# Patient Record
Sex: MALE | Race: WHITE | NOT HISPANIC OR LATINO | Employment: OTHER | URBAN - METROPOLITAN AREA
[De-identification: names, ages, dates, MRNs, and addresses within clinical notes are randomized per-mention and may not be internally consistent; named-entity substitution may affect disease eponyms.]

---

## 2017-01-12 ENCOUNTER — TRANSCRIBE ORDERS (OUTPATIENT)
Dept: ADMINISTRATIVE | Facility: HOSPITAL | Age: 62
End: 2017-01-12

## 2017-01-12 DIAGNOSIS — R13.11 ORAL PHASE DYSPHAGIA: Primary | ICD-10-CM

## 2017-01-17 ENCOUNTER — HOSPITAL ENCOUNTER (OUTPATIENT)
Dept: GENERAL RADIOLOGY | Facility: HOSPITAL | Age: 62
Discharge: HOME | End: 2017-01-17
Admitting: PHYSICAL MEDICINE & REHABILITATION

## 2017-01-17 DIAGNOSIS — R13.11 ORAL PHASE DYSPHAGIA: ICD-10-CM

## 2017-01-17 PROCEDURE — 92611 MOTION FLUOROSCOPY/SWALLOW: CPT

## 2017-01-17 PROCEDURE — G8997 SWALLOW GOAL STATUS: HCPCS

## 2017-01-17 PROCEDURE — G8998 SWALLOW D/C STATUS: HCPCS

## 2017-01-17 PROCEDURE — G8996 SWALLOW CURRENT STATUS: HCPCS

## 2017-01-17 PROCEDURE — 74230 X-RAY XM SWLNG FUNCJ C+: CPT

## 2017-01-17 NOTE — PROGRESS NOTES
Outpatient Speech Language Pathology   Adult Swallow Initial Evaluation  [facility]     Patient Name: Aquiles Bahena  : 1955  MRN: [id number]  Today's Date: 2017         Visit Date: 2017   There is no problem list on file for this patient.       History reviewed. No pertinent past medical history.     History reviewed. No pertinent past surgical history.      Visit Dx:     ICD-10-CM ICD-9-CM   1. Oral phase dysphagia R13.11 787.21         SPEECH-LANGUAGE PATHOLOGY EVALUTION - VFSS  Subjective: The patient was seen on this date for a VFSS(Videofluoroscopic Swallowing Study).  Patient was alert and cooperative.    The patient is having difficulty w/ pocketing & intermittent choking. Currently working w/ SLP.     Objective: Risks/benefits were reviewed with the patient, and consent was obtained. The study was completed with SLP present and Radiologist review. The patient was seen in lateral view(s). Textures given included thin liquid, puree consistency, mechanical soft consistency and regular consistency.    Assessment: Mild oral dysphagia impacted by cognition.  Posterior spillage occurred w/ all PO.  Piecemeal deglutition noted w/ all solids. Mild oral residue w/ puree & mech soft cleared w/ a liquid wash.  At times pocketing on right side w/ regluar, more mild to moderate oral residue noted & pt did eventually clear when cued to consume numerous drinks.  No penetration or aspiration observed. Trace pharyngeal residue noted after the swallow.  Osteophytes present at C5 & down, but did not impede the swallow.     Recommendations: Diet Textures: thin liquid, mechanical soft consistency food w/ whole meats, avoid dry & sticky textures. Medications should be taken whole with thin liquids or w/ puree if needed.     Recommended Strategies: Supervision w/ meals; encourage liquid wash throughout meals, Upright for PO and small bites and sips. Oral care before breakfast, after all meals and PRN.    Continue  w/ current SLP's POC for therapy.               Adult Dysphagia - 01/17/17 1208     Adult Dysphagia Background    Patient Description of Complaint --   reports of pocketing w/ PO at times; no everyday   -NB    Date of Onset unknown  -NB    Current Diet (Solids) Regular  -NB    Diet Level (Liquids) Thin Liquid  -NB    Oral Mech    Respiratory/Swallow Coordination Pattern WFL  -NB    Position During Evaluation Upright  -NB    Anatomy/Physiology WNL Patient demonstrates anatomy that is WNL  -NB    Dentition Patient has own teeth  -NB    Oral Health Oral cavity is clean  -NB    Awareness/Control of Secretions Patient swallows saliva  -NB    SLP Communication to Radiology    Summary Statement Mild oral dysphagia impacted by cognition.  Posterior spillage occurred w/ all PO.  Piecemeal deglutition noted w/ all solids. Mild oral residue w/ puree & mech soft cleared w/ a liquid wash.  At times pocketing on right side w/ regluar,  more mild to moderate oral residue noted & pt did eventually clear when cued to consume numerous drinks.  No penetration or aspiration observed.   -NB      User Key  (r) = Recorded By, (t) = Taken By, (c) = Cosigned By    Initials Name Provider Type    KRISSY Andrea MS CCC-SLP Speech and Language Pathologist                            OP SLP Education       01/17/17 1213          Education    Barriers to Learning Decreased comprehension  -NB      Education Provided Described results of evaluation   Pt's SLP present; explained results to her.   -NB      Assessed Learning needs;Learning motivation;Learning preferences;Learning readiness  -NB      Learning Motivation Moderate  -NB      Learning Method Explanation  -NB      Teaching Response Reinforcement needed  -NB        User Key  (r) = Recorded By, (t) = Taken By, (c) = Cosigned By    Initials Name Effective Dates    KRISSY Andrea MS CCC-SLP 04/13/15 -                     OP SLP Assessment/Plan - 01/17/17 1212     SLP Assessment    Functional  Problems Swallowing  -NB    Impact on Function: Swallowing Risk of aspiration;Risk of pneumonia  -NB    Clinical Impression: Swallowing Mild:;oral phase dysphagia  -NB    SLP Diagnosis mild oral dysphagia impacted by cognition  -NB    Prognosis Good (comment)  -NB    Patient would benefit from skilled therapy intervention Yes  -NB    SLP Plan    Frequency TBD  -NB    Duration TBD  -NB    Planned CPT's? SLP SWALLOW THERAPY: 62644  -NB    Expected Duration Therapy Session (min) 15-30 minutes  -NB    Plan Comments Continue w/ SLP's POC at facility  -NB      User Key  (r) = Recorded By, (t) = Taken By, (c) = Cosigned By    Initials Name Provider Type    KRISSY Andrea MS CCC-SLP Speech and Language Pathologist              SLP Outcome Measures (last 72 hours)      SLP Outcome Measures       01/17/17 1214          SLP Outcome Measures    Outcome Measure Used? Adult NOMS  -NB      FCM Scores    FCM Chosen Swallowing  -NB      Swallowing FCM Score 5  -NB        User Key  (r) = Recorded By, (t) = Taken By, (c) = Cosigned By    Initials Name Effective Dates    MS KEHINDE Moreland 04/13/15 -                Time Calculation:   SLP Start Time: 1030  SLP Stop Time: 1115  SLP Time Calculation (min): 45 min    Therapy Charges for Today     Code Description Service Date Service Provider Modifiers Qty    03020334300 HC ST SWALLOWING CURRENT STATUS 1/17/2017 Adrianne Andrea MS CCC-SLP GN, CJ 1    57565037808 HC ST SWALLOWING PROJECTED 1/17/2017 Adrianne Andrea MS CCC-SLP GN, CJ 1    33849168576 HC ST SWALLOWING DISCHARGE 1/17/2017 Adrianne Andrea MS CCC-SLP GN,  1    82366416874 HC ST MOTION FLUORO EVAL SWALLOW 3 1/17/2017 Adrianne Andrea MS CCC-SLP GN 1          SLP G-Codes  SLP NOMS Used?: Yes  Functional Limitations: Swallowing  Swallow Current Status (): At least 20 percent but less than 40 percent impaired, limited or restricted  Swallow Goal Status (): At least 20 percent but less than 40 percent impaired, limited  or restricted  Swallow Discharge Status (): At least 20 percent but less than 40 percent impaired, limited or restricted        Adrianne Andrea MS CCC-SLP  1/17/2017

## 2022-05-20 ENCOUNTER — HOSPITAL ENCOUNTER (EMERGENCY)
Facility: HOSPITAL | Age: 67
Discharge: NURSING, REHAB OR LONG-TERM CARE | End: 2022-05-20
Attending: EMERGENCY MEDICINE | Admitting: EMERGENCY MEDICINE

## 2022-05-20 VITALS
OXYGEN SATURATION: 97 % | HEART RATE: 84 BPM | TEMPERATURE: 97.9 F | SYSTOLIC BLOOD PRESSURE: 113 MMHG | RESPIRATION RATE: 16 BRPM | DIASTOLIC BLOOD PRESSURE: 74 MMHG

## 2022-05-20 DIAGNOSIS — K94.23 GASTROSTOMY TUBE DYSFUNCTION: Primary | ICD-10-CM

## 2022-05-20 PROCEDURE — 99283 EMERGENCY DEPT VISIT LOW MDM: CPT

## 2022-05-20 RX ORDER — POTASSIUM CHLORIDE 1500 MG/1
20 TABLET, FILM COATED, EXTENDED RELEASE ORAL 2 TIMES DAILY
COMMUNITY

## 2022-05-20 RX ORDER — DONEPEZIL HYDROCHLORIDE 10 MG/1
10 TABLET, FILM COATED ORAL NIGHTLY
COMMUNITY

## 2022-05-20 RX ORDER — POLYVINYL ALCOHOL 14 MG/ML
1 SOLUTION/ DROPS OPHTHALMIC EVERY 4 HOURS PRN
COMMUNITY

## 2022-05-20 RX ORDER — TAMSULOSIN HYDROCHLORIDE 0.4 MG/1
1 CAPSULE ORAL DAILY
COMMUNITY

## 2022-05-20 RX ORDER — GLYCOPYRROLATE 1 MG/1
1 TABLET ORAL 2 TIMES DAILY
COMMUNITY

## 2022-05-20 RX ORDER — LACTOBACILLUS ACIDOPHILUS / LACTOBACILLUS BULGARICUS 100 MILLION CFU STRENGTH
GRANULES ORAL 2 TIMES DAILY
COMMUNITY

## 2022-05-20 RX ORDER — CARVEDILOL 3.12 MG/1
3.12 TABLET ORAL 2 TIMES DAILY WITH MEALS
COMMUNITY

## 2022-05-20 RX ORDER — LOPERAMIDE HYDROCHLORIDE 2 MG/1
2 CAPSULE ORAL EVERY 6 HOURS PRN
COMMUNITY

## 2022-05-20 RX ORDER — ACETAMINOPHEN 500 MG
500 TABLET ORAL EVERY 4 HOURS PRN
COMMUNITY

## 2022-05-20 RX ORDER — PANTOPRAZOLE SODIUM 40 MG/1
40 TABLET, DELAYED RELEASE ORAL DAILY
COMMUNITY

## 2022-05-20 RX ORDER — SIMVASTATIN 40 MG
40 TABLET ORAL NIGHTLY
COMMUNITY

## 2022-05-20 RX ORDER — MIRTAZAPINE 15 MG/1
7.5 TABLET, FILM COATED ORAL NIGHTLY
COMMUNITY

## 2022-05-20 RX ORDER — HYDROCODONE BITARTRATE AND ACETAMINOPHEN 5; 325 MG/1; MG/1
1 TABLET ORAL EVERY 6 HOURS PRN
COMMUNITY

## 2022-05-20 RX ORDER — TRAZODONE HYDROCHLORIDE 50 MG/1
50 TABLET ORAL NIGHTLY
COMMUNITY

## 2022-05-20 NOTE — ED NOTES
Pt to ED from [place] [facility] for a clogged gtube. NP refused to come into facility to unclog the gtube and told them to call EMS to go to ED.

## 2022-05-20 NOTE — ED PROVIDER NOTES
MD ATTESTATION NOTE    The MANJEET and I have discussed this patient's history, physical exam, and treatment plan.  I have reviewed the documentation and personally had a face to face interaction with the patient. I affirm the documentation and agree with the treatment and plan.  The attached note describes my personal findings.      I provided a substantive portion of the care of the patient.  I personally performed the physical exam in its entirety, and below are my findings.  For this patient encounter, the patient wore surgical mask, I wore full protective PPE including N95 and eye protection.      Brief HPI: Patient presents for clogged G-tube.  Patient can give no history from nursing home.    PHYSICAL EXAM  ED Triage Vitals [05/20/22 1702]   Temp Heart Rate Resp BP SpO2   97.9 °F (36.6 °C) 83 16 103/68 97 %      Temp src Heart Rate Source Patient Position BP Location FiO2 (%)   -- -- -- -- --         GENERAL: no acute distress  HENT: nares patent  EYES: no scleral icterus  CV: regular rhythm, normal rate  RESPIRATORY: normal effort  ABDOMEN: soft.  G-tube in place  MUSCULOSKELETAL: no deformity  NEURO: Patient cannot answer questions or follow commands  PSYCH:  calm, cooperative  SKIN: warm, dry    Vital signs and nursing notes reviewed.        Plan: G-tube has been irrigated here and has been cleared.  Will be discharged back       Carlton Jones MD  05/20/22 9409

## 2022-05-20 NOTE — CASE MANAGEMENT/SOCIAL WORK
Contacted [facility] dispatch to arrange ambulance transport back to [facility]. Awaiting return call with [facility]NER Lane[facility] GIOVANY

## 2022-05-20 NOTE — ED PROVIDER NOTES
EMERGENCY DEPARTMENT ENCOUNTER    Room Number:  B10/10  Date seen:  5/20/2022  Time seen: 17:12 EDT  PCP: Mary Jane Yu MD  Historian: NH, EMS    HPI:  Chief complaint:g tube problem  A complete HPI/ROS/PMH/PSH/SH/FH are unobtainable due to: dementia  Context:Aquiles Bahena is a 66 y.o. male with multiple medical problems including dementia, acute hypoxic respiratory failure , BPH, g tube, h/oi covid, anoxic brain injury who presents to the ED with c/o clogged g tube.  Pt can nod head yes or not but is otherwise non verbal.  History is limited due to dementia    Patient was placed in face mask in first look. Patient was wearing facemask when I entered the room and throughout our encounter. I wore full protective equipment throughout this patient encounter including a N95 face mask, eye shield and gloves. Hand hygiene/washing of hands was performed before donning protective equipment and after removal when leaving the room.    MEDICAL RECORD REVIEW    ALLERGIES  Bupropion    PAST MEDICAL HISTORY  Active Ambulatory Problems     Diagnosis Date Noted   • No Active Ambulatory Problems     Resolved Ambulatory Problems     Diagnosis Date Noted   • No Resolved Ambulatory Problems     Past Medical History:   Diagnosis Date   • Anoxic brain damage (HCC)    • Anxiety    • Arthritis    • BPH (benign prostatic hyperplasia)    • Coronary artery disease    • COVID-19    • Dementia (HCC)    • Dysphagia    • GERD (gastroesophageal reflux disease)    • Hyperlipidemia    • Hypotension        PAST SURGICAL HISTORY  No past surgical history on file.    FAMILY HISTORY  No family history on file.    SOCIAL HISTORY  Social History     Socioeconomic History   • Marital status:        REVIEW OF SYSTEMS  Review of Systems   Unable to perform ROS: Dementia     PHYSICAL EXAM    ED Triage Vitals [05/20/22 1702]   Temp Heart Rate Resp BP SpO2   97.9 °F (36.6 °C) 83 16 103/68 97 %      Temp src Heart Rate Source Patient Position BP Location  FiO2 (%)   -- -- -- -- --     Physical Exam    I have reviewed the triage vital signs and nursing notes.      GENERAL: not distressed, chronically ill appearing  HENT: nares patent  EYES: no scleral icterus  NECK: no ROM limitations  CV: regular rhythm, regular rate, no murmur, no rubs, no gallups  RESPIRATORY: normal effort, CTAB, occasional cough  ABDOMEN: soft, rounded.  G tube in place.  I was able to easily flush the gtube multiple times.  I injected air and auscultated and the air sound is appropriate.  There is no abdominal tenderness  : deferred  MUSCULOSKELETAL: no deformity  NEURO: alert, moves all extremities, follows commands  SKIN: warm, dry    PROGRESS, DATA ANALYSIS, CONSULTS AND MEDICAL DECISION MAKING  All labs have been independently reviewed by me.  All radiology studies have been reviewed by me and discussed with radiologist dictating the report.  EKG's independently viewed and interpreted by me unless stated otherwise. Discussion below represents my analysis of pertinent findings related to patient's condition, differential diagnosis, treatment plan and final disposition.        DDX: g tube problem, G tube displacement, g tube not flushing    MDM: I was able to easily flush the G-tube and auscultate placement with a air bolus.  There were no other complaints.  Nurse did call the nursing home to make sure there were no other concerns and there were not.  Patient will be discharged back to the skilled nursing facility.     Reviewed pt's history and workup with Dr. Jones.  After a bedside evaluation, Dr. Jones agrees with the plan of care.    The patient's history, physical exam, and lab findings were discussed with the physician, who also performed a face to face history and physical exam.  I discussed all results and noted any abnormalities with patient.  Discussed absoute need to recheck abnormalities with their family physician.  I answered any of the patient's questions.  Discussed  plan for discharge, as there is no emergent indication for admission.  Pt is agreeable and understands need for follow up and repeat testing.  Pt is aware that discharge does not mean that nothing is wrong but it indicates no emergency is present and they must continue care with their family physician.  Pt is discharged with instructions to follow up with primary care doctor to have their blood pressure rechecked.     Disposition vitals:  /68   Pulse 83   Temp 97.9 °F (36.6 °C)   Resp 16   SpO2 97%       DIAGNOSIS  Final diagnoses:   Gastrostomy tube dysfunction (HCC)       FOLLOW UP   Mary Jane Yu MD  [address] [facility] [address] [phone]    Schedule an appointment as soon as possible for a visit            Ailyn Mcgregor, APRN  05/20/22 5534

## 2023-05-15 ENCOUNTER — APPOINTMENT (OUTPATIENT)
Dept: GENERAL RADIOLOGY | Facility: HOSPITAL | Age: 68
End: 2023-05-15
Payer: MEDICARE

## 2023-05-15 PROCEDURE — 71046 X-RAY EXAM CHEST 2 VIEWS: CPT

## 2023-05-15 PROCEDURE — 0202U NFCT DS 22 TRGT SARS-COV-2: CPT | Performed by: EMERGENCY MEDICINE

## 2023-05-15 PROCEDURE — 99285 EMERGENCY DEPT VISIT HI MDM: CPT

## 2023-05-15 PROCEDURE — 36415 COLL VENOUS BLD VENIPUNCTURE: CPT

## 2023-05-15 NOTE — ED TRIAGE NOTES
"To ER via EMS from [facility].  Sent in for g-tube replacement.  Per facility g-tube clogged and when staff tried to flush it, it \"popped' and broke in half.      Upon arrival to ER pt noted to have productive cough and appears to not feel well.   "

## 2023-05-16 ENCOUNTER — HOSPITAL ENCOUNTER (OUTPATIENT)
Facility: HOSPITAL | Age: 68
LOS: 1 days | Discharge: NURSING, REHAB OR LONG-TERM CARE | End: 2023-05-17
Attending: EMERGENCY MEDICINE | Admitting: HOSPITALIST
Payer: MEDICARE

## 2023-05-16 DIAGNOSIS — J18.9 PNEUMONIA OF LEFT LOWER LOBE DUE TO INFECTIOUS ORGANISM: Primary | ICD-10-CM

## 2023-05-16 DIAGNOSIS — K94.23 GASTROSTOMY TUBE DYSFUNCTION: ICD-10-CM

## 2023-05-16 DIAGNOSIS — R79.89 ELEVATED LACTIC ACID LEVEL: ICD-10-CM

## 2023-05-16 PROBLEM — R13.10 DYSPHAGIA: Status: ACTIVE | Noted: 2023-05-16

## 2023-05-16 PROBLEM — I25.10 CAD (CORONARY ARTERY DISEASE): Status: ACTIVE | Noted: 2023-05-16

## 2023-05-16 PROBLEM — N40.0 BPH (BENIGN PROSTATIC HYPERPLASIA): Status: ACTIVE | Noted: 2023-05-16

## 2023-05-16 PROBLEM — K21.9 GERD (GASTROESOPHAGEAL REFLUX DISEASE): Status: ACTIVE | Noted: 2023-05-16

## 2023-05-16 PROBLEM — I10 HTN (HYPERTENSION): Status: ACTIVE | Noted: 2023-05-16

## 2023-05-16 PROBLEM — E78.5 HLD (HYPERLIPIDEMIA): Status: ACTIVE | Noted: 2023-05-16

## 2023-05-16 PROBLEM — G93.1 ANOXIC BRAIN INJURY: Status: ACTIVE | Noted: 2023-05-16

## 2023-05-16 PROBLEM — F03.90 DEMENTIA: Status: ACTIVE | Noted: 2023-05-16

## 2023-05-16 LAB
ALBUMIN SERPL-MCNC: 4.1 G/DL (ref 3.5–5.2)
ALBUMIN/GLOB SERPL: 1 G/DL
ALP SERPL-CCNC: 140 U/L (ref 39–117)
ALT SERPL W P-5'-P-CCNC: 25 U/L (ref 1–41)
ANION GAP SERPL CALCULATED.3IONS-SCNC: 13.3 MMOL/L (ref 5–15)
ANION GAP SERPL CALCULATED.3IONS-SCNC: 6.7 MMOL/L (ref 5–15)
APTT PPP: 27.5 SECONDS (ref 22.7–35.4)
AST SERPL-CCNC: 21 U/L (ref 1–40)
BASOPHILS # BLD AUTO: 0.04 10*3/MM3 (ref 0–0.2)
BASOPHILS NFR BLD AUTO: 0.5 % (ref 0–1.5)
BILIRUB SERPL-MCNC: 0.6 MG/DL (ref 0–1.2)
BUN SERPL-MCNC: 10 MG/DL (ref 8–23)
BUN SERPL-MCNC: 13 MG/DL (ref 8–23)
BUN/CREAT SERPL: 18.9 (ref 7–25)
BUN/CREAT SERPL: 20.3 (ref 7–25)
CALCIUM SPEC-SCNC: 8.4 MG/DL (ref 8.6–10.5)
CALCIUM SPEC-SCNC: 9.6 MG/DL (ref 8.6–10.5)
CHLORIDE SERPL-SCNC: 105 MMOL/L (ref 98–107)
CHLORIDE SERPL-SCNC: 109 MMOL/L (ref 98–107)
CO2 SERPL-SCNC: 24.3 MMOL/L (ref 22–29)
CO2 SERPL-SCNC: 27.7 MMOL/L (ref 22–29)
CREAT SERPL-MCNC: 0.53 MG/DL (ref 0.76–1.27)
CREAT SERPL-MCNC: 0.64 MG/DL (ref 0.76–1.27)
D-LACTATE SERPL-SCNC: 1.1 MMOL/L (ref 0.5–2)
D-LACTATE SERPL-SCNC: 4.7 MMOL/L (ref 0.5–2)
DEPRECATED RDW RBC AUTO: 39.2 FL (ref 37–54)
DEPRECATED RDW RBC AUTO: 41 FL (ref 37–54)
EGFRCR SERPLBLD CKD-EPI 2021: 103.8 ML/MIN/1.73
EGFRCR SERPLBLD CKD-EPI 2021: 109.8 ML/MIN/1.73
EOSINOPHIL # BLD AUTO: 0.26 10*3/MM3 (ref 0–0.4)
EOSINOPHIL NFR BLD AUTO: 3.2 % (ref 0.3–6.2)
ERYTHROCYTE [DISTWIDTH] IN BLOOD BY AUTOMATED COUNT: 12.5 % (ref 12.3–15.4)
ERYTHROCYTE [DISTWIDTH] IN BLOOD BY AUTOMATED COUNT: 12.7 % (ref 12.3–15.4)
GLOBULIN UR ELPH-MCNC: 4.1 GM/DL
GLUCOSE BLDC GLUCOMTR-MCNC: 79 MG/DL (ref 70–130)
GLUCOSE SERPL-MCNC: 104 MG/DL (ref 65–99)
GLUCOSE SERPL-MCNC: 114 MG/DL (ref 65–99)
HCT VFR BLD AUTO: 42.6 % (ref 37.5–51)
HCT VFR BLD AUTO: 47.5 % (ref 37.5–51)
HGB BLD-MCNC: 14 G/DL (ref 13–17.7)
HGB BLD-MCNC: 15 G/DL (ref 13–17.7)
IMM GRANULOCYTES # BLD AUTO: 0.02 10*3/MM3 (ref 0–0.05)
IMM GRANULOCYTES NFR BLD AUTO: 0.2 % (ref 0–0.5)
INR PPP: 0.94 (ref 0.9–1.1)
LYMPHOCYTES # BLD AUTO: 1.88 10*3/MM3 (ref 0.7–3.1)
LYMPHOCYTES NFR BLD AUTO: 22.9 % (ref 19.6–45.3)
MCH RBC QN AUTO: 28 PG (ref 26.6–33)
MCH RBC QN AUTO: 28.7 PG (ref 26.6–33)
MCHC RBC AUTO-ENTMCNC: 31.6 G/DL (ref 31.5–35.7)
MCHC RBC AUTO-ENTMCNC: 32.9 G/DL (ref 31.5–35.7)
MCV RBC AUTO: 87.3 FL (ref 79–97)
MCV RBC AUTO: 88.8 FL (ref 79–97)
MONOCYTES # BLD AUTO: 0.58 10*3/MM3 (ref 0.1–0.9)
MONOCYTES NFR BLD AUTO: 7.1 % (ref 5–12)
MRSA DNA SPEC QL NAA+PROBE: NORMAL
NEUTROPHILS NFR BLD AUTO: 5.44 10*3/MM3 (ref 1.7–7)
NEUTROPHILS NFR BLD AUTO: 66.1 % (ref 42.7–76)
NRBC BLD AUTO-RTO: 0 /100 WBC (ref 0–0.2)
PLATELET # BLD AUTO: 192 10*3/MM3 (ref 140–450)
PLATELET # BLD AUTO: 216 10*3/MM3 (ref 140–450)
PMV BLD AUTO: 10.5 FL (ref 6–12)
PMV BLD AUTO: 11 FL (ref 6–12)
POTASSIUM SERPL-SCNC: 3.8 MMOL/L (ref 3.5–5.2)
POTASSIUM SERPL-SCNC: 3.9 MMOL/L (ref 3.5–5.2)
PROCALCITONIN SERPL-MCNC: 0.05 NG/ML (ref 0–0.25)
PROT SERPL-MCNC: 8.2 G/DL (ref 6–8.5)
PROTHROMBIN TIME: 12.7 SECONDS (ref 11.7–14.2)
RBC # BLD AUTO: 4.88 10*6/MM3 (ref 4.14–5.8)
RBC # BLD AUTO: 5.35 10*6/MM3 (ref 4.14–5.8)
SODIUM SERPL-SCNC: 140 MMOL/L (ref 136–145)
SODIUM SERPL-SCNC: 146 MMOL/L (ref 136–145)
WBC NRBC COR # BLD: 7.82 10*3/MM3 (ref 3.4–10.8)
WBC NRBC COR # BLD: 8.22 10*3/MM3 (ref 3.4–10.8)

## 2023-05-16 PROCEDURE — 96367 TX/PROPH/DG ADDL SEQ IV INF: CPT

## 2023-05-16 PROCEDURE — 85025 COMPLETE CBC W/AUTO DIFF WBC: CPT | Performed by: EMERGENCY MEDICINE

## 2023-05-16 PROCEDURE — 83605 ASSAY OF LACTIC ACID: CPT | Performed by: EMERGENCY MEDICINE

## 2023-05-16 PROCEDURE — 85027 COMPLETE CBC AUTOMATED: CPT | Performed by: NURSE PRACTITIONER

## 2023-05-16 PROCEDURE — 85730 THROMBOPLASTIN TIME PARTIAL: CPT | Performed by: EMERGENCY MEDICINE

## 2023-05-16 PROCEDURE — 85610 PROTHROMBIN TIME: CPT | Performed by: EMERGENCY MEDICINE

## 2023-05-16 PROCEDURE — 0 CEFEPIME PER 500 MG: Performed by: EMERGENCY MEDICINE

## 2023-05-16 PROCEDURE — 96365 THER/PROPH/DIAG IV INF INIT: CPT

## 2023-05-16 PROCEDURE — 82948 REAGENT STRIP/BLOOD GLUCOSE: CPT

## 2023-05-16 PROCEDURE — 84145 PROCALCITONIN (PCT): CPT | Performed by: EMERGENCY MEDICINE

## 2023-05-16 PROCEDURE — 96366 THER/PROPH/DIAG IV INF ADDON: CPT

## 2023-05-16 PROCEDURE — 87040 BLOOD CULTURE FOR BACTERIA: CPT | Performed by: EMERGENCY MEDICINE

## 2023-05-16 PROCEDURE — 96361 HYDRATE IV INFUSION ADD-ON: CPT

## 2023-05-16 PROCEDURE — 99203 OFFICE O/P NEW LOW 30 MIN: CPT | Performed by: SURGERY

## 2023-05-16 PROCEDURE — 80053 COMPREHEN METABOLIC PANEL: CPT | Performed by: EMERGENCY MEDICINE

## 2023-05-16 PROCEDURE — 25010000002 VANCOMYCIN 10 G RECONSTITUTED SOLUTION: Performed by: EMERGENCY MEDICINE

## 2023-05-16 PROCEDURE — 87641 MR-STAPH DNA AMP PROBE: CPT | Performed by: NURSE PRACTITIONER

## 2023-05-16 RX ORDER — POTASSIUM CHLORIDE 20MEQ/15ML
20 LIQUID (ML) ORAL 2 TIMES DAILY
COMMUNITY
Start: 2023-05-01 | End: 2023-05-17 | Stop reason: HOSPADM

## 2023-05-16 RX ORDER — SODIUM CHLORIDE 0.9 % (FLUSH) 0.9 %
10 SYRINGE (ML) INJECTION EVERY 12 HOURS SCHEDULED
Status: DISCONTINUED | OUTPATIENT
Start: 2023-05-16 | End: 2023-05-17 | Stop reason: HOSPADM

## 2023-05-16 RX ORDER — SODIUM CHLORIDE 9 MG/ML
40 INJECTION, SOLUTION INTRAVENOUS AS NEEDED
Status: DISCONTINUED | OUTPATIENT
Start: 2023-05-16 | End: 2023-05-17 | Stop reason: HOSPADM

## 2023-05-16 RX ORDER — ATORVASTATIN CALCIUM 20 MG/1
20 TABLET, FILM COATED ORAL DAILY
Status: DISCONTINUED | OUTPATIENT
Start: 2023-05-16 | End: 2023-05-17 | Stop reason: HOSPADM

## 2023-05-16 RX ORDER — SODIUM CHLORIDE 0.9 % (FLUSH) 0.9 %
10 SYRINGE (ML) INJECTION AS NEEDED
Status: DISCONTINUED | OUTPATIENT
Start: 2023-05-16 | End: 2023-05-17 | Stop reason: HOSPADM

## 2023-05-16 RX ORDER — ACETAMINOPHEN 160 MG/5ML
650 SOLUTION ORAL EVERY 4 HOURS PRN
Status: DISCONTINUED | OUTPATIENT
Start: 2023-05-16 | End: 2023-05-17 | Stop reason: HOSPADM

## 2023-05-16 RX ORDER — ACETAMINOPHEN 650 MG/1
650 SUPPOSITORY RECTAL EVERY 4 HOURS PRN
Status: DISCONTINUED | OUTPATIENT
Start: 2023-05-16 | End: 2023-05-17 | Stop reason: HOSPADM

## 2023-05-16 RX ORDER — SODIUM CHLORIDE 9 MG/ML
100 INJECTION, SOLUTION INTRAVENOUS CONTINUOUS
Status: DISCONTINUED | OUTPATIENT
Start: 2023-05-16 | End: 2023-05-16

## 2023-05-16 RX ORDER — ASPIRIN 81 MG/1
81 TABLET, CHEWABLE ORAL DAILY
Status: DISCONTINUED | OUTPATIENT
Start: 2023-05-16 | End: 2023-05-17 | Stop reason: HOSPADM

## 2023-05-16 RX ORDER — NITROGLYCERIN 0.4 MG/1
0.4 TABLET SUBLINGUAL
Status: DISCONTINUED | OUTPATIENT
Start: 2023-05-16 | End: 2023-05-17 | Stop reason: HOSPADM

## 2023-05-16 RX ORDER — ONDANSETRON 4 MG/1
4 TABLET, FILM COATED ORAL EVERY 6 HOURS PRN
Status: DISCONTINUED | OUTPATIENT
Start: 2023-05-16 | End: 2023-05-17 | Stop reason: HOSPADM

## 2023-05-16 RX ORDER — IPRATROPIUM BROMIDE AND ALBUTEROL SULFATE 2.5; .5 MG/3ML; MG/3ML
3 SOLUTION RESPIRATORY (INHALATION) EVERY 4 HOURS PRN
Status: DISCONTINUED | OUTPATIENT
Start: 2023-05-16 | End: 2023-05-17 | Stop reason: HOSPADM

## 2023-05-16 RX ORDER — DONEPEZIL HYDROCHLORIDE 10 MG/1
10 TABLET, FILM COATED ORAL NIGHTLY
Status: DISCONTINUED | OUTPATIENT
Start: 2023-05-16 | End: 2023-05-17 | Stop reason: HOSPADM

## 2023-05-16 RX ORDER — ACETAMINOPHEN 500 MG
500 TABLET ORAL EVERY 4 HOURS PRN
Status: DISCONTINUED | OUTPATIENT
Start: 2023-05-16 | End: 2023-05-16 | Stop reason: SDUPTHER

## 2023-05-16 RX ORDER — ONDANSETRON 2 MG/ML
4 INJECTION INTRAMUSCULAR; INTRAVENOUS EVERY 6 HOURS PRN
Status: DISCONTINUED | OUTPATIENT
Start: 2023-05-16 | End: 2023-05-17 | Stop reason: HOSPADM

## 2023-05-16 RX ORDER — OMEPRAZOLE 40 MG/1
40 CAPSULE, DELAYED RELEASE ORAL DAILY
COMMUNITY
Start: 2023-03-01

## 2023-05-16 RX ORDER — CALCIUM CARBONATE 500 MG/1
2 TABLET, CHEWABLE ORAL 2 TIMES DAILY PRN
Status: DISCONTINUED | OUTPATIENT
Start: 2023-05-16 | End: 2023-05-17 | Stop reason: HOSPADM

## 2023-05-16 RX ORDER — ACETAMINOPHEN 325 MG/1
650 TABLET ORAL EVERY 4 HOURS PRN
Status: DISCONTINUED | OUTPATIENT
Start: 2023-05-16 | End: 2023-05-17 | Stop reason: HOSPADM

## 2023-05-16 RX ORDER — CARVEDILOL 3.12 MG/1
3.12 TABLET ORAL 2 TIMES DAILY WITH MEALS
Status: DISCONTINUED | OUTPATIENT
Start: 2023-05-16 | End: 2023-05-17 | Stop reason: HOSPADM

## 2023-05-16 RX ORDER — GLYCOPYRROLATE 1 MG/1
1 TABLET ORAL 2 TIMES DAILY
Status: DISCONTINUED | OUTPATIENT
Start: 2023-05-16 | End: 2023-05-17 | Stop reason: HOSPADM

## 2023-05-16 RX ORDER — TRAZODONE HYDROCHLORIDE 50 MG/1
50 TABLET ORAL NIGHTLY
Status: DISCONTINUED | OUTPATIENT
Start: 2023-05-16 | End: 2023-05-17 | Stop reason: HOSPADM

## 2023-05-16 RX ORDER — TERAZOSIN 1 MG/1
1 CAPSULE ORAL NIGHTLY
Status: DISCONTINUED | OUTPATIENT
Start: 2023-05-16 | End: 2023-05-17 | Stop reason: HOSPADM

## 2023-05-16 RX ADMIN — VANCOMYCIN HYDROCHLORIDE 2250 MG: 10 INJECTION, POWDER, LYOPHILIZED, FOR SOLUTION INTRAVENOUS at 04:08

## 2023-05-16 RX ADMIN — ASPIRIN 81 MG: 81 TABLET, CHEWABLE ORAL at 15:12

## 2023-05-16 RX ADMIN — Medication 10 ML: at 03:33

## 2023-05-16 RX ADMIN — DONEPEZIL HYDROCHLORIDE 10 MG: 10 TABLET, FILM COATED ORAL at 21:54

## 2023-05-16 RX ADMIN — SODIUM CHLORIDE 100 ML/HR: 9 INJECTION, SOLUTION INTRAVENOUS at 04:08

## 2023-05-16 RX ADMIN — TERAZOSIN HYDROCHLORIDE 1 MG: 1 CAPSULE ORAL at 21:55

## 2023-05-16 RX ADMIN — GLYCOPYRROLATE 1 MG: 1 TABLET ORAL at 21:54

## 2023-05-16 RX ADMIN — CEFEPIME 2 G: 2 INJECTION, POWDER, FOR SOLUTION INTRAVENOUS at 03:29

## 2023-05-16 RX ADMIN — TRAZODONE HYDROCHLORIDE 50 MG: 50 TABLET ORAL at 21:54

## 2023-05-16 RX ADMIN — ATORVASTATIN CALCIUM 20 MG: 20 TABLET, FILM COATED ORAL at 15:12

## 2023-05-16 RX ADMIN — CARVEDILOL 3.12 MG: 3.12 TABLET, FILM COATED ORAL at 21:54

## 2023-05-16 RX ADMIN — SODIUM CHLORIDE 100 ML/HR: 9 INJECTION, SOLUTION INTRAVENOUS at 08:34

## 2023-05-16 RX ADMIN — Medication 10 ML: at 21:55

## 2023-05-16 NOTE — CONSULTS
"Nutrition Services    Patient Name:  Aquiles Bahena  YOB: 1955  MRN: [id number]  Admit Date:  5/16/2023    Assessment Date:  05/16/23    Comment: Nutrition consult for TF assessment.  Patient from NH with clogged Gtube.  History of anoxic brain injury, dementia, dysphagia.  Dr. Kirk has resolved the Gtube issue at bedside and tube is now okay to use.      Per paper chart, patient was receiving TFs of Osmolite 1.5 at 50 mL/hr with 60 mL q hour flushes at NH.  Our equivalent of this formula is Nutren 1.5.  Recommend starting TFs of Nutren 1.5 at 20 mL/hr and advancing by 10 mL q 2 hours to goal rate of 50 mL/hr.  Recommend free water flushes of 30 mL q 4 hours for now while IVFs running.    Per chart review, plans for back to NH soon.    RD to follow closely for tolerance.    CLINICAL NUTRITION ASSESSMENT      Reason for Assessment Tube Feeding Assessment      Diagnosis/Problem   Gtube dysfunction    Medical/Surgical History Past Medical History:   Diagnosis Date   • Anoxic brain damage    • Anxiety    • Arthritis    • BPH (benign prostatic hyperplasia)    • Coronary artery disease    • COVID-19    • Dementia    • Dysphagia    • GERD (gastroesophageal reflux disease)    • Hyperlipidemia    • Hypotension        Past Surgical History:   Procedure Laterality Date   • GTUBE INSERTION          Encounter Information        Nutrition History:  Receives TFs of Osmolite 1.5 at 50 mL/hr with 60 mL q hr flushes at NH   Food Preferences:    Supplements:    Factors Affecting Intake: swallow impairment     Anthropometrics        Current Height  Current Weight  BMI kg/m2 Height: 180.3 cm (71\")  Weight: 77.9 kg (171 lb 11.2 oz) (05/16/23 0825)  Body mass index is 23.95 kg/m².   Adjusted BMI (if applicable)    BMI Category Normal/Healthy (18.4 - 24.9)       Admission Weight 171 lb (5/16)       Ideal Body Weight (IBW) 172 lb (78.2 kg)   Adjusted IBW (if applicable)        Usual Body Weight (UBW) 151 lb (4/2022)   Weight " Change/Trend Gain       Weight History Wt Readings from Last 30 Encounters:   05/16/23 0825 77.9 kg (171 lb 11.2 oz)   05/16/23 0204 109 kg (240 lb)           --  Estimated/Assessed Needs       Energy Requirements    Weight for Calculation 171 lb (77.9 kg)   Method for Estimation  20 kcal/kg, 25 kcal/kg   EST Needs (kcal/day) 3017-4466       Protein Requirements    Weight for Calculation 171 lb (77.9 kg)   EST Protein Needs (g/kg) 1.0 gm/kg   EST Daily Needs (g/day) 78       Fluid Requirements     Method for Estimation 30 mL/kg    Estimated Needs (mL/day) 2300     Tests/Procedures        Tests/Procedures No new tests/procedures     Labs       Pertinent Labs    Results from last 7 days   Lab Units 05/16/23 0527 05/16/23 0102   SODIUM mmol/L 140 146*   POTASSIUM mmol/L 3.9 3.8   CHLORIDE mmol/L 109* 105   CO2 mmol/L 24.3 27.7   BUN mg/dL 10 13   CREATININE mg/dL 0.53* 0.64*   CALCIUM mg/dL 8.4* 9.6   BILIRUBIN mg/dL  --  0.6   ALK PHOS U/L  --  140*   ALT (SGPT) U/L  --  25   AST (SGOT) U/L  --  21   GLUCOSE mg/dL 114* 104*     Results from last 7 days   Lab Units 05/16/23 0527 05/16/23  0102   HEMOGLOBIN g/dL 14.0 15.0   HEMATOCRIT % 42.6 47.5   WBC 10*3/mm3 7.82 8.22   ALBUMIN g/dL  --  4.1     Results from last 7 days   Lab Units 05/16/23 0527 05/16/23  0102   INR   --  0.94   APTT seconds  --  27.5   PLATELETS 10*3/mm3 192 216     COVID19   Date Value Ref Range Status   05/15/2023 Not Detected Not Detected - Ref. Range Final     No results found for: HGBA1C       Medications           Scheduled Medications aspirin, 81 mg, Per G Tube, Daily  atorvastatin, 20 mg, Per G Tube, Daily  carvedilol, 3.125 mg, Per G Tube, BID With Meals  donepezil, 10 mg, Per G Tube, Nightly  glycopyrrolate, 1 mg, Per G Tube, BID  [START ON 5/17/2023] lansoprazole, 30 mg, Per G Tube, Q AM  sodium chloride, 10 mL, Intravenous, Q12H  terazosin, 1 mg, Per G Tube, Nightly  traZODone, 50 mg, Per G Tube, Nightly       Infusions sodium  chloride, 100 mL/hr, Last Rate: 100 mL/hr (05/16/23 0834)       PRN Medications •  acetaminophen **OR** acetaminophen **OR** acetaminophen  •  calcium carbonate  •  ipratropium-albuterol  •  nitroglycerin  •  ondansetron **OR** ondansetron  •  [COMPLETED] Insert Peripheral IV **AND** sodium chloride  •  sodium chloride  •  sodium chloride     Physical Findings          Physical Appearance disoriented, room air   Oral/Mouth Cavity tooth or teeth missing   Edema  no edema   Gastrointestinal fecal incontinence, last bowel movement:5/16   Skin  excoriation   Tubes/Drains/Lines gastrostomy tube   NFPE Not applicable at this time   --  Current Nutrition Orders & Evaluation of Intake       Oral Nutrition     Food Allergies NKFA   Current PO Diet NPO Diet NPO Type: Strict NPO   Supplement n/a   PO Evaluation     % PO Intake n/a    # of Days Evaluated    --  PES STATEMENT / NUTRITION DIAGNOSIS      Nutrition Dx Problem  Problem: Needs Alternative Route  Etiology: Medical Diagnosis (anoxic brain injury, dysphagia)  Signs/Symptoms: NPO    Comment:    --  NUTRITION INTERVENTION / PLAN OF CARE      Intervention Goal(s) Maintain nutrition status, Nutrition support treatment, Reduce/improve symptoms, Meet estimated needs, Disease management/therapy, Initiate TF/PN, Tolerate TF/PN at goal and Maintain weight         RD Intervention/Action Await initiation of EN/PN, Follow Tx Progress, Care plan reviewed and Recommend/ordered: EN         Prescription/Orders:       PO Diet       Supplements       Snacks       Enteral Nutrition    Enteral Prescription:     Enteral Route Gastrostomy    TF Delivery Method Continuous    Enteral Product Nutren 1.5    Modular None    Propofol Rate/Kcal     TF Start Rate (mL/hr) 20    TF Goal Rate (mL/hr) 50    Free Water Flush (mL) 30 mL q 4 hours (rec 55 mL q hour once IVFs d/c'ed)    TF Provision at Goal: 1800 kcal, 82 gm protein, 912 mL free water + 180 mL water flushes         Calories 100 % needs met          Protein  100 % needs met         Fluid (mL) 1092 mL total     Prescription Ordered Yes         Parenteral Nutrition    New Prescription Ordered? Yes   --      Monitor/Evaluation Per protocol, I&O, Pertinent labs, EN delivery/tolerance, Weight, Skin status, GI status, Symptoms   Discharge Plan/Needs Pending clinical course   Education Will instruct as appropriate   --    RD to follow per protocol.      Electronically signed by:  Antoinette Pickett RD  05/16/23 15:48 EDT

## 2023-05-16 NOTE — CONSULTS
General Surgery Consultation    Consulting Physician: Caro Kirk MD  Referring Physician: IDRIS Elizondo    Reason for consultation: PEG tube dysfunction    CC: Unable to be obtained, patient is nonverbal/poor historian    HPI:   The patient is a 67 y.o. male that presented to the hospital emergency room overnight with a clogged G-tube.  He resides in a nursing home ([facility]), and the facility nursing staff told the EMS when they tried flushing his clogged G-tube it broke in half yesterday.  The tube was then cut and capped at 4 cm from the skin.  The patient has a history of anoxic brain injury, and I am unsure how long the tube has been in place but per CT reports from the [facility] system over 1 year ago he had a G-tube in place at that time.    Past Medical History:  Reportedly has history of anoxic brain injury, otherwise unable to be obtained    Past Surgical History:  Endoscopic PEG tube placement, otherwise unable to be obtained    Medications:  Medications Prior to Admission   Medication Sig Dispense Refill Last Dose   • acetaminophen (TYLENOL) 500 MG tablet Administer 1 tablet per G tube Every 4 (Four) Hours As Needed for Mild Pain.   Unknown   • Aspirin 81 MG capsule Administer  per G tube Daily.   Unknown   • carvedilol (COREG) 3.125 MG tablet Administer 1 tablet per G tube 2 (Two) Times a Day With Meals.   Unknown   • donepezil (ARICEPT) 10 MG tablet Administer 1 tablet per G tube Every Night.   Unknown   • glycopyrrolate (ROBINUL) 1 MG tablet Administer 1 tablet per G tube 2 (Two) Times a Day.   Unknown   • loperamide (IMODIUM) 2 MG capsule Administer 1 capsule per G tube Every 6 (Six) Hours As Needed for Diarrhea.   Unknown   • omeprazole (priLOSEC) 40 MG capsule Administer 1 capsule per G tube Daily.   Unknown   • pantoprazole (PROTONIX) 40 MG EC tablet 1 tablet Daily.      • polyvinyl alcohol (LIQUIFILM) 1.4 % ophthalmic solution Administer 1 drop to the right eye Every  4 (Four) Hours As Needed for Dry Eyes.   Unknown   • potassium chloride (K-DUR,KLOR-CON) 20 MEQ tablet controlled-release ER tablet 1 tablet 2 (Two) Times a Day.      • potassium chloride (KAYCIEL) 20 mEq/15 mL solution Administer 15 mL per G tube 2 (Two) Times a Day.   Unknown   • simvastatin (ZOCOR) 40 MG tablet Administer 1 tablet per G tube Every Night.   Unknown   • tamsulosin (FLOMAX) 0.4 MG capsule 24 hr capsule 1 capsule Daily.   Unknown   • traZODone (DESYREL) 50 MG tablet Administer 1 tablet per G tube Every Night.   Unknown       Allergies: Per chart review he has an allergy to bupropion    Social History: Unable to be obtained, patient is poor historian    Family History: Unable to be obtained, patient is poor historian    Review of Systems:  Physical Exam:   Vitals:    05/16/23 0825   BP: 115/66   Pulse: 75   Resp: 18   Temp: 97.8 °F (36.6 °C)   SpO2: 96%     Height: 180 cm  Weight: 77.9 kg  BMI: 23.95  GENERAL: Sleeping comfortably, in no acute distress, awakens to voice but is stoic and does not answer any questions for me  HEENT: normocephalic, atraumatic, no scleral icterus, moist mucous membranes  NECK: Supple, there is no thyromegaly or lymphadenopathy  RESPIRATORY: clear to auscultation, no wheezes, rales or rhonchi, symmetric air entry  CARDIOVASCULAR: regular rate and rhythm    GASTROINTESTINAL: Soft, nontender, nondistended, left upper quadrant 20 French PEG tube in good position with the cap located at 4 cm from the skin  MUSCULOSKELETAL: no cyanosis, clubbing, or edema   NEUROLOGIC: Sleeping comfortably but awakens to voice, does not follow any commands but nods his head yes and no sporadically to questions  SKIN: Moist, warm, no rashes, no jaundice      Diagnostic workup:     Pertinent labs:   Results from last 7 days   Lab Units 05/16/23  0527 05/16/23  0102   WBC 10*3/mm3 7.82 8.22   HEMOGLOBIN g/dL 14.0 15.0   HEMATOCRIT % 42.6 47.5   PLATELETS 10*3/mm3 192 216     Results from last 7  days   Lab Units 05/16/23  0527 05/16/23  0102   SODIUM mmol/L 140 146*   POTASSIUM mmol/L 3.9 3.8   CHLORIDE mmol/L 109* 105   CO2 mmol/L 24.3 27.7   BUN mg/dL 10 13   CREATININE mg/dL 0.53* 0.64*   CALCIUM mg/dL 8.4* 9.6   BILIRUBIN mg/dL  --  0.6   ALK PHOS U/L  --  140*   ALT (SGPT) U/L  --  25   AST (SGOT) U/L  --  21   GLUCOSE mg/dL 114* 104*       IMAGING:  CT abdomen from April 2022 reportedly showed a G-tube in the stomach (Felix Frias)    Assessment and plan:     The patient is a 67 y.o. male with clogged/broken gastrostomy tube    I removed his PEG tube at bedside which he tolerated well and I replaced it with a 20 Uzbek Dominguez catheter that can be used as a G-tube.  The balloon was inflated with 10 cc of sterile water and the tube itself was flushed with 60 cc of water with no water extravasation noted.  The tube can be used immediately for tube feeds at his goal rate and he can be discharged back to his nursing home at any time.  I discussed this with his nurse on the unit.  I will sign off, please call back with any questions or concerns.    Caro Kirk MD  General, Robotic, and Endoscopic Surgery  [facility]    [address], [address]  [place] [address]  P: [phone]  F: [phone]

## 2023-05-16 NOTE — ED PROVIDER NOTES
EMERGENCY DEPARTMENT ENCOUNTER    Room Number:  14/14  Date seen:  5/16/2023  PCP: Lali Fink MD  Historian: Nursing home report      HPI:  Chief Complaint: Clogged G-tube  A complete HPI/ROS/PMH/PSH/SH/FH are unobtainable due to: Dementia  Context: Aquiles Bahena is a 67 y.o. male who presents to the ED today with G-tube dysfunction.  Per their reports, the patient's G-tube was clogged and then deemed nonfunctional when they attempted to flush it.  The patient also is noted to have a productive cough with green sputum upon his ED arrival.  There have been no reports of fever/chills, pain complaints, feelings of shortness of breath, or known sick contacts.  Given his mental history, the remainder of the HPI is unobtainable.            PAST MEDICAL HISTORY  Active Ambulatory Problems     Diagnosis Date Noted   • No Active Ambulatory Problems     Resolved Ambulatory Problems     Diagnosis Date Noted   • No Resolved Ambulatory Problems     Past Medical History:   Diagnosis Date   • Anoxic brain damage    • Anxiety    • Arthritis    • BPH (benign prostatic hyperplasia)    • Coronary artery disease    • COVID-19    • Dementia    • Dysphagia    • GERD (gastroesophageal reflux disease)    • Hyperlipidemia    • Hypotension          PAST SURGICAL HISTORY  Past Surgical History:   Procedure Laterality Date   • GTUBE INSERTION           FAMILY HISTORY  History reviewed. No pertinent family history.      SOCIAL HISTORY  Social History     Socioeconomic History   • Marital status:    Tobacco Use   • Smoking status: Unknown   Substance and Sexual Activity   • Alcohol use: Defer   • Drug use: Defer         ALLERGIES  Bupropion        REVIEW OF SYSTEMS  Review of Systems   Unable to perform ROS: Dementia          PHYSICAL EXAM  ED Triage Vitals [05/15/23 1932]   Temp Heart Rate Resp BP SpO2   97.3 °F (36.3 °C) 72 16 135/74 98 %      Temp src Heart Rate Source Patient Position BP Location FiO2 (%)   Tympanic -- -- --  --       Physical Exam  Vitals and nursing note reviewed.   Constitutional:       General: He is not in acute distress.  HENT:      Head: Normocephalic and atraumatic.   Eyes:      Pupils: Pupils are equal, round, and reactive to light.   Cardiovascular:      Rate and Rhythm: Normal rate and regular rhythm.      Heart sounds: Normal heart sounds.   Pulmonary:      Effort: Pulmonary effort is normal. No respiratory distress.      Breath sounds: Examination of the right-lower field reveals rhonchi. Examination of the left-lower field reveals rhonchi. Rhonchi present.   Abdominal:      Palpations: Abdomen is soft.      Tenderness: There is no abdominal tenderness. There is no guarding or rebound.   Musculoskeletal:         General: Normal range of motion.      Cervical back: Normal range of motion and neck supple.   Skin:     General: Skin is warm and dry.   Neurological:      Mental Status: He is alert and oriented to person, place, and time.      Sensory: Sensation is intact.   Psychiatric:         Mood and Affect: Mood and affect normal.         Vital signs and nursing notes reviewed.          LAB RESULTS  Recent Results (from the past 24 hour(s))   Respiratory Panel PCR w/COVID-19(SARS-CoV-2) M[facility]AN/ANITA/ZULMA/PAD/[facility]/MAD/BREANN In-House, NP Swab in UTM/VTM, 3-4 HR TAT - Swab, Nasopharynx    Collection Time: 05/15/23  8:47 PM    Specimen: Nasopharynx; Swab   Result Value Ref Range    ADENOVIRUS, PCR Not Detected Not Detected    Coronavirus 229E Not Detected Not Detected    Coronavirus HKU1 Not Detected Not Detected    Coronavirus NL63 Not Detected Not Detected    Coronavirus OC43 Not Detected Not Detected    COVID19 Not Detected Not Detected - Ref. Range    Human Metapneumovirus Not Detected Not Detected    Human Rhinovirus/Enterovirus Not Detected Not Detected    Influenza A PCR Not Detected Not Detected    Influenza B PCR Not Detected Not Detected    Parainfluenza Virus 1 Not Detected Not Detected    Parainfluenza Virus 2  Not Detected Not Detected    Parainfluenza Virus 3 Not Detected Not Detected    Parainfluenza Virus 4 Not Detected Not Detected    RSV, PCR Not Detected Not Detected    Bordetella pertussis pcr Not Detected Not Detected    Bordetella parapertussis PCR Not Detected Not Detected    Chlamydophila pneumoniae PCR Not Detected Not Detected    Mycoplasma pneumo by PCR Not Detected Not Detected   Comprehensive Metabolic Panel    Collection Time: 05/16/23  1:02 AM    Specimen: Blood   Result Value Ref Range    Glucose 104 (H) 65 - 99 mg/dL    BUN 13 8 - 23 mg/dL    Creatinine 0.64 (L) 0.76 - 1.27 mg/dL    Sodium 146 (H) 136 - 145 mmol/L    Potassium 3.8 3.5 - 5.2 mmol/L    Chloride 105 98 - 107 mmol/L    CO2 27.7 22.0 - 29.0 mmol/L    Calcium 9.6 8.6 - 10.5 mg/dL    Total Protein 8.2 6.0 - 8.5 g/dL    Albumin 4.1 3.5 - 5.2 g/dL    ALT (SGPT) 25 1 - 41 U/L    AST (SGOT) 21 1 - 40 U/L    Alkaline Phosphatase 140 (H) 39 - 117 U/L    Total Bilirubin 0.6 0.0 - 1.2 mg/dL    Globulin 4.1 gm/dL    A/G Ratio 1.0 g/dL    BUN/Creatinine Ratio 20.3 7.0 - 25.0    Anion Gap 13.3 5.0 - 15.0 mmol/L    eGFR 103.8 >60.0 mL/min/1.73   Protime-INR    Collection Time: 05/16/23  1:02 AM    Specimen: Blood   Result Value Ref Range    Protime 12.7 11.7 - 14.2 Seconds    INR 0.94 0.90 - 1.10   aPTT    Collection Time: 05/16/23  1:02 AM    Specimen: Blood   Result Value Ref Range    PTT 27.5 22.7 - 35.4 seconds   Lactic Acid, Plasma    Collection Time: 05/16/23  1:02 AM    Specimen: Blood   Result Value Ref Range    Lactate 4.7 (C) 0.5 - 2.0 mmol/L   Procalcitonin    Collection Time: 05/16/23  1:02 AM    Specimen: Blood   Result Value Ref Range    Procalcitonin 0.05 0.00 - 0.25 ng/mL   CBC Auto Differential    Collection Time: 05/16/23  1:02 AM    Specimen: Blood   Result Value Ref Range    WBC 8.22 3.40 - 10.80 10*3/mm3    RBC 5.35 4.14 - 5.80 10*6/mm3    Hemoglobin 15.0 13.0 - 17.7 g/dL    Hematocrit 47.5 37.5 - 51.0 %    MCV 88.8 79.0 - 97.0 fL     MCH 28.0 26.6 - 33.0 pg    MCHC 31.6 31.5 - 35.7 g/dL    RDW 12.7 12.3 - 15.4 %    RDW-SD 41.0 37.0 - 54.0 fl    MPV 11.0 6.0 - 12.0 fL    Platelets 216 140 - 450 10*3/mm3    Neutrophil % 66.1 42.7 - 76.0 %    Lymphocyte % 22.9 19.6 - 45.3 %    Monocyte % 7.1 5.0 - 12.0 %    Eosinophil % 3.2 0.3 - 6.2 %    Basophil % 0.5 0.0 - 1.5 %    Immature Grans % 0.2 0.0 - 0.5 %    Neutrophils, Absolute 5.44 1.70 - 7.00 10*3/mm3    Lymphocytes, Absolute 1.88 0.70 - 3.10 10*3/mm3    Monocytes, Absolute 0.58 0.10 - 0.90 10*3/mm3    Eosinophils, Absolute 0.26 0.00 - 0.40 10*3/mm3    Basophils, Absolute 0.04 0.00 - 0.20 10*3/mm3    Immature Grans, Absolute 0.02 0.00 - 0.05 10*3/mm3    nRBC 0.0 0.0 - 0.2 /100 WBC       Ordered the above labs and reviewed the results.        RADIOLOGY  XR Chest 2 View    Result Date: 5/15/2023  XR CHEST 2 VW-  HISTORY: Male who is 67 years-old,  cough  TECHNIQUE: Frontal and lateral views of the chest  COMPARISON: None available  FINDINGS: Assessment is limited by difficulty with patient positioning. The heart size is borderline. Mild atelectasis or infiltrate is apparent in the left lower lung. No large pleural effusion. No pneumothorax. Right hemidiaphragm appears elevated. No acute osseous process.      Mild atelectasis or infiltrate in the left lower lung. Borderline heart size.  This report was finalized on 5/15/2023 8:57 PM by Dr. Cesario Handley M.D.        Ordered the above noted radiological studies. Reviewed by me in PACS.            PROCEDURES  Procedures            MEDICATIONS GIVEN IN ER  Medications   sodium chloride 0.9 % flush 10 mL (has no administration in time range)   vancomycin 2250 mg/500 mL 0.9% NS IVPB ([facility]) (has no administration in time range)   cefepime 2 gm IVPB in 100 ml NS (VTB) (has no administration in time range)                   MEDICAL DECISION MAKING, PROGRESS, and CONSULTS    All labs have been independently reviewed by me.  All radiology studies have been  reviewed by me and I have also reviewed the radiology report.   EKG's independently viewed and interpreted by me.  Discussion below represents my analysis of pertinent findings related to patient's condition, differential diagnosis, treatment plan and final disposition.      Additional sources:  - Discussed/ obtained information from independent historians: History obtained from the EMS report    - External (non-ED) record review: Upon medical records review, the patient was admitted to the hospital on 4/19/2022 and treated for healthcare associated pneumonia.    - Chronic or social conditions impacting care: Dementia, anoxic brain injury    - Shared decision making: Decision to admit was made in conjunction with my discussion with IDRIS Elizondo for [facility].  She agrees to admit the patient to the hospital today for Dr. Stiles.      Orders placed during this visit:  Orders Placed This Encounter   Procedures   • Respiratory Panel PCR w/COVID-19(SARS-CoV-2) [facility]AN/ANITA/ZULMA/PAD/COR/MAD/BREANN In-House, NP Swab in UTM/VTM, 3-4 HR TAT - Swab, Nasopharynx   • Blood Culture - Blood,   • Blood Culture - Blood,   • XR Chest 2 View   • Comprehensive Metabolic Panel   • Protime-INR   • aPTT   • Lactic Acid, Plasma   • Procalcitonin   • CBC Auto Differential   • STAT Lactic Acid, Reflex   • [facility] (on-call MD unless specified) Details   • Insert Peripheral IV   • CBC & Differential           Differential diagnosis includes but is not limited to:    Differential diagnosis includes but is not limited to hospital-acquired pneumonia, pleural effusion, pneumothorax, acute bronchitis, CHF with pulmonary edema, or a clogged/nonfunctional G-tube      Independent interpretation of labs, radiology studies, and discussions with consultants:    Chest x-ray independently interpreted by myself with my interpretation as a infiltrate present in the left lung base without obvious pneumothorax, or pulmonary edema.      ED Course as of 05/16/23 6583    Tue May 16, 2023   0207 The patient's lactic acid is significantly elevated potentially consistent with the diagnosis of a pneumonia.  Will order IV fluids as well as IV antibiotics to treat HCAP.  Reevaluation does show the patient to be resting quite comfortably with oxygen saturations of 96% on room air.  We will admit the patient to the hospital for further management and treatment. [BM]   0233 The patient's presentation, work-up, as well as diagnosis and treatment plan were discussed at length with IDRIS Elizondo for A.  She agrees to admit the patient to the hospital today for Dr. Stiles. [BM]      ED Course User Index  [BM] Shahab Cantu MD             DIAGNOSIS  Final diagnoses:   Pneumonia of left lower lobe due to infectious organism   Gastrostomy tube dysfunction   Elevated lactic acid level         DISPOSITION  ADMISSION    Discussed treatment plan and reason for admission with pt/family and admitting physician.  Pt/family voiced understanding of the plan for admission for further testing/treatment as needed.                 Latest Documented Vital Signs:  As of 02:34 EDT  BP- 101/71 HR- 70 Temp- 97.5 °F (36.4 °C) (Oral) O2 sat- 96%              --    Please note that portions of this were completed with a voice recognition program.       Note Disclaimer: At [facility], we believe that sharing information builds trust and better relationships. You are receiving this note because you are receiving care at [facility] or recently visited. It is possible you will see health information before a provider has talked with you about it. This kind of information can be easy to misunderstand. To help you fully understand what it means for your health, we urge you to discuss this note with your provider.             Shahab Cantu MD  05/16/23 0238

## 2023-05-16 NOTE — H&P
Patient Name:  Aquiles Bahena  YOB: 1955  MRN:  [id number]  Admit Date:  5/16/2023  Patient Care Team:  Lali Fink MD as PCP - General (Family Medicine)      Subjective   History Present Illness     Chief Complaint   Patient presents with   • g-tube replacement   • Cough       Mr. Bahena is a 67 y.o. non-smoker with a history of anoxic brain injury, dementia, hyperlipidemia, coronary artery disease, dysphagia, GERD, and BPH that presents to [facility] due to a dysfunction of his gastrostomy tube.  The patient is a poor historian on exam so history is obtained from medical records.  Per ED notes, he was sent in from his nursing home due to a clogged g-tube.  While in the ED, he began to cough up green colored sputum.  A chest x-ray showed mild atelectasis or infiltrate in the left lower lung.  Lab work revealed sodium 146 and lactate 4.7.  An attempt to unclog his g-tube was unsuccessful in the ED.  He received Vancomycin and Cefepime and is being admitted for further evaluation.      History of Present Illness  Review of Systems   Unable to perform ROS: Dementia        Personal History     Past Medical History:   Diagnosis Date   • Anoxic brain damage    • Anxiety    • Arthritis    • BPH (benign prostatic hyperplasia)    • Coronary artery disease    • COVID-19    • Dementia    • Dysphagia    • GERD (gastroesophageal reflux disease)    • Hyperlipidemia    • Hypotension      Past Surgical History:   Procedure Laterality Date   • GTUBE INSERTION       History reviewed. No pertinent family history.  Social History     Tobacco Use   • Smoking status: Unknown   Substance Use Topics   • Alcohol use: Defer   • Drug use: Defer     (Not in a hospital admission)    Allergies:    Allergies   Allergen Reactions   • Bupropion Other (See Comments)       Objective    Objective     Vital Signs  Temp:  [97.3 °F (36.3 °C)-97.5 °F (36.4 °C)] 97.5 °F (36.4 °C)  Heart Rate:  [70-72] 70  Resp:   [16] 16  BP: (101-135)/(71-74) 101/71  SpO2:  [95 %-98 %] 96 %  on   ;   Device (Oxygen Therapy): room air  Body mass index is 33.47 kg/m².    Physical Exam  Vitals and nursing note reviewed.   Constitutional:       Appearance: He is ill-appearing.      Comments: Chronically ill appearing   HENT:      Head: Normocephalic and atraumatic.      Mouth/Throat:      Mouth: Mucous membranes are dry.      Pharynx: Oropharynx is clear.   Eyes:      Extraocular Movements: Extraocular movements intact.      Conjunctiva/sclera: Conjunctivae normal.   Cardiovascular:      Rate and Rhythm: Normal rate and regular rhythm.      Pulses: Normal pulses.      Heart sounds: Normal heart sounds.   Pulmonary:      Effort: Pulmonary effort is normal.      Breath sounds: Examination of the right-lower field reveals decreased breath sounds. Examination of the left-lower field reveals decreased breath sounds. Decreased breath sounds present.   Abdominal:      General: Bowel sounds are normal. There is no distension.      Palpations: Abdomen is soft. There is no mass.      Tenderness: There is no abdominal tenderness. There is no guarding or rebound.      Hernia: No hernia is present.   Musculoskeletal:         General: No swelling. Normal range of motion.      Cervical back: Normal range of motion and neck supple.   Skin:     General: Skin is warm and dry.   Neurological:      Mental Status: He is alert. Mental status is at baseline. He is confused.      Cranial Nerves: Cranial nerve deficit present.      Motor: Weakness (generalized), atrophy and abnormal muscle tone present.   Psychiatric:         Mood and Affect: Mood normal.         Behavior: Behavior normal.         Results Review:  I reviewed the patient's new clinical results.  I reviewed the patient's new imaging results and agree with the interpretation.  I reviewed the patient's other test results and agree with the interpretation  I personally viewed and interpreted the patient's  EKG/Telemetry data  Discussed with ED provider.    Lab Results (last 24 hours)     Procedure Component Value Units Date/Time    Respiratory Panel PCR w/COVID-19(SARS-CoV-2) MADAN/ANITA/ZULMA/PAD/COR/MAD/BREANN In-House, NP Swab in UTM/VTM, 3-4 HR TAT - Swab, Nasopharynx [[id number]]  (Normal) Collected: 05/15/23 2047    Specimen: Swab from Nasopharynx Updated: 05/15/23 2142     ADENOVIRUS, PCR Not Detected     Coronavirus 229E Not Detected     Coronavirus HKU1 Not Detected     Coronavirus NL63 Not Detected     Coronavirus OC43 Not Detected     COVID19 Not Detected     Human Metapneumovirus Not Detected     Human Rhinovirus/Enterovirus Not Detected     Influenza A PCR Not Detected     Influenza B PCR Not Detected     Parainfluenza Virus 1 Not Detected     Parainfluenza Virus 2 Not Detected     Parainfluenza Virus 3 Not Detected     Parainfluenza Virus 4 Not Detected     RSV, PCR Not Detected     Bordetella pertussis pcr Not Detected     Bordetella parapertussis PCR Not Detected     Chlamydophila pneumoniae PCR Not Detected     Mycoplasma pneumo by PCR Not Detected    Narrative:      In the setting of a positive respiratory panel with a viral infection PLUS a negative procalcitonin without other underlying concern for bacterial infection, consider observing off antibiotics or discontinuation of antibiotics and continue supportive care. If the respiratory panel is positive for atypical bacterial infection (Bordetella pertussis, Chlamydophila pneumoniae, or Mycoplasma pneumoniae), consider antibiotic de-escalation to target atypical bacterial infection.    CBC & Differential [[id number]]  (Normal) Collected: 05/16/23 0102    Specimen: Blood Updated: 05/16/23 0119    Narrative:      The following orders were created for panel order CBC & Differential.  Procedure                               Abnormality         Status                     ---------                               -----------         ------                     CBC Auto  Differential[[id number]]        Normal              Final result                 Please view results for these tests on the individual orders.    Comprehensive Metabolic Panel [[id number]]  (Abnormal) Collected: 05/16/23 0102    Specimen: Blood Updated: 05/16/23 0138     Glucose 104 mg/dL      BUN 13 mg/dL      Creatinine 0.64 mg/dL      Sodium 146 mmol/L      Potassium 3.8 mmol/L      Chloride 105 mmol/L      CO2 27.7 mmol/L      Calcium 9.6 mg/dL      Total Protein 8.2 g/dL      Albumin 4.1 g/dL      ALT (SGPT) 25 U/L      AST (SGOT) 21 U/L      Alkaline Phosphatase 140 U/L      Total Bilirubin 0.6 mg/dL      Globulin 4.1 gm/dL      A/G Ratio 1.0 g/dL      BUN/Creatinine Ratio 20.3     Anion Gap 13.3 mmol/L      eGFR 103.8 mL/min/1.73     Narrative:      GFR Normal >60  Chronic Kidney Disease <60  Kidney Failure <15      Protime-INR [[id number]]  (Normal) Collected: 05/16/23 0102    Specimen: Blood Updated: 05/16/23 0138     Protime 12.7 Seconds      INR 0.94    aPTT [[id number]]  (Normal) Collected: 05/16/23 0102    Specimen: Blood Updated: 05/16/23 0138     PTT 27.5 seconds     Blood Culture - Blood, Arm, Right [[id number]] Collected: 05/16/23 0102    Specimen: Blood from Arm, Right Updated: 05/16/23 0113    Blood Culture - Blood, Arm, Left [[id number]] Collected: 05/16/23 0102    Specimen: Blood from Arm, Left Updated: 05/16/23 0113    Lactic Acid, Plasma [[id number]]  (Abnormal) Collected: 05/16/23 0102    Specimen: Blood Updated: 05/16/23 0138     Lactate 4.7 mmol/L     Procalcitonin [[id number]]  (Normal) Collected: 05/16/23 0102    Specimen: Blood Updated: 05/16/23 0145     Procalcitonin 0.05 ng/mL     Narrative:      As a Marker for Sepsis (Non-Neonates):    1. <0.5 ng/mL represents a low risk of severe sepsis and/or septic shock.  2. >2 ng/mL represents a high risk of severe sepsis and/or septic shock.    As a Marker for Lower Respiratory Tract Infections that require antibiotic therapy:    PCT on Admission     "Antibiotic Therapy       6-12 Hrs later    >0.5                Strongly Recommended  >0.25 - <0.5        Recommended   0.1 - 0.25          Discouraged              Remeasure/reassess PCT  <0.1                Strongly Discouraged     Remeasure/reassess PCT    As 28 day mortality risk marker: \"Change in Procalcitonin Result\" (>80% or <=80%) if Day 0 (or Day 1) and Day 4 values are available. Refer to http://www.[facility]-pct-calculator.com    Change in PCT <=80%  A decrease of PCT levels below or equal to 80% defines a positive change in PCT test result representing a higher risk for 28-day all-cause mortality of patients diagnosed with severe sepsis for septic shock.    Change in PCT >80%  A decrease of PCT levels of more than 80% defines a negative change in PCT result representing a lower risk for 28-day all-cause mortality of patients diagnosed with severe sepsis or septic shock.       CBC Auto Differential [[id number]]  (Normal) Collected: 05/16/23 0102    Specimen: Blood Updated: 05/16/23 0119     WBC 8.22 10*3/mm3      RBC 5.35 10*6/mm3      Hemoglobin 15.0 g/dL      Hematocrit 47.5 %      MCV 88.8 fL      MCH 28.0 pg      MCHC 31.6 g/dL      RDW 12.7 %      RDW-SD 41.0 fl      MPV 11.0 fL      Platelets 216 10*3/mm3      Neutrophil % 66.1 %      Lymphocyte % 22.9 %      Monocyte % 7.1 %      Eosinophil % 3.2 %      Basophil % 0.5 %      Immature Grans % 0.2 %      Neutrophils, Absolute 5.44 10*3/mm3      Lymphocytes, Absolute 1.88 10*3/mm3      Monocytes, Absolute 0.58 10*3/mm3      Eosinophils, Absolute 0.26 10*3/mm3      Basophils, Absolute 0.04 10*3/mm3      Immature Grans, Absolute 0.02 10*3/mm3      nRBC 0.0 /100 WBC           Imaging Results (Last 24 Hours)     Procedure Component Value Units Date/Time    XR Chest 2 View [[id number]] Collected: 05/15/23 2056     Updated: 05/15/23 2100    Narrative:      XR CHEST 2 VW-     HISTORY: Male who is 67 years-old,  cough     TECHNIQUE: Frontal and lateral views of " the chest     COMPARISON: None available     FINDINGS: Assessment is limited by difficulty with patient positioning.  The heart size is borderline. Mild atelectasis or infiltrate is apparent  in the left lower lung. No large pleural effusion. No pneumothorax.  Right hemidiaphragm appears elevated. No acute osseous process.       Impression:      Mild atelectasis or infiltrate in the left lower lung.  Borderline heart size.     This report was finalized on 5/15/2023 8:57 PM by Dr. Cesario Handley M.D.                 No orders to display        Assessment/Plan     Active Hospital Problems    Diagnosis  POA   • **Gastrostomy tube dysfunction [K94.23]  Unknown   • Anoxic brain injury [G93.1]  Unknown   • Dementia [F03.90]  Unknown   • Dysphagia [R13.10]  Unknown   • GERD (gastroesophageal reflux disease) [K21.9]  Unknown   • CAD (coronary artery disease) [I25.10]  Unknown   • Left lower lobe pneumonia [J18.9]  Unknown   • BPH (benign prostatic hyperplasia) [N40.0]  Unknown   • HTN (hypertension) [I10]  Unknown   • HLD (hyperlipidemia) [E78.5]  Unknown       G-tube Dysfunction  -Keep NPO, hold tube feeds  -General surgery consult    Left Lower Lobe Pneumonia  -Monitor on telemetry  -He has lactic acidosis. He has been afebrile, WBC and procal ok  -RVP negative. He has history of dysphagia, pneumonia most likely secondary to aspiration  -Continue Cefepime and Vancomycin for now  -Blood cultures pending    Hypernatremia  -Most likely secondary to dehydration  -IVF  -Repeat lab work in AM    Hypertension  -Blood pressures stable. Continue home regimen when g-tube is repaired  -Monitor    Hyperlipidemia/Coronary Artery Disease  -Continue aspirin and statin when g-tube is repaired    GERD  -Continue home dose of Protonix when g-tube is repaired    Dementia/Anoxic Brain Injury  -Mood stable  -Continue Aricept and Remeron when g-tube is repaired    Chronic Pain Syndrome  -Continue home dose of Norco as needed for pain when  g-tube is repaired    BPH  -Continue Flomax once g-tube is repaired    -Will address and continue any further appropriate home medications once med rec is completed.    -I discussed the patients findings and my recommendations with patient.    VTE Prophylaxis - SCDs.  Code Status - Full code.       IDRIS Leyva  [facility]  05/16/23  05:20 EDT'

## 2023-05-16 NOTE — PLAN OF CARE
Problem: Aspiration (Enteral Nutrition)  Goal: Absence of Aspiration Signs and Symptoms  Outcome: Ongoing, Progressing     Problem: Device-Related Complication Risk (Enteral Nutrition)  Goal: Safe, Effective Therapy Delivery  Outcome: Ongoing, Progressing     Problem: Feeding Intolerance (Enteral Nutrition)  Goal: Feeding Tolerance  Outcome: Ongoing, Progressing   Goal Outcome Evaluation:  TFs of Nutren 1.5 to begin - goal rate 50 mL/hr  RD to follow closely for tolerance

## 2023-05-16 NOTE — PROGRESS NOTES
"[facility] Clinical Pharmacy Services: Vancomycin Pharmacokinetic Initial Consult Note    Aquiles Bahena is a 67 y.o. male who is on day 1 of pharmacy to dose vancomycin.    Indication: Pneumonia, fever  Consulting Provider: IDRIS Elizondo  Planned Duration of Therapy: 7 days  Loading Dose Ordered or Given: 2250 mg on 5/16/23 at 0400  MRSA PCR performed: Orderd; Result:    Culture/Source:    Target: -600 mg/L.hr   Other Antimicrobials:      Vitals/Labs  Ht: 180.3 cm (71\"); Wt: 109 kg (240 lb)  Temp Readings from Last 1 Encounters:   05/16/23 98 °F (36.7 °C) (Oral)    Estimated Creatinine Clearance: 169.9 mL/min (A) (by C-G formula based on SCr of 0.53 mg/dL (L)).        Results from last 7 days   Lab Units 05/16/23  0527 05/16/23  0102   CREATININE mg/dL 0.53* 0.64*   WBC 10*3/mm3 7.82 8.22     Assessment/Plan:    1. Vancomycin Dose:   1250 mg IV every  12  hours  2. Predictive AUC level for the dose ordered is 448 mg/L.hr, which is within the target of 400-600 mg/L.hr  3. Vanc Trough has been ordered for 05/17/23 at 1300     Pharmacy will follow patient's kidney function and will adjust doses and obtain levels as necessary. Thank you for involving pharmacy in this patient's care. Please contact pharmacy with any questions or concerns.                           Abdullahi Johnson, [facility]  Clinical Pharmacist      "

## 2023-05-17 VITALS
HEIGHT: 71 IN | SYSTOLIC BLOOD PRESSURE: 102 MMHG | OXYGEN SATURATION: 95 % | BODY MASS INDEX: 24.04 KG/M2 | TEMPERATURE: 97.6 F | RESPIRATION RATE: 14 BRPM | HEART RATE: 65 BPM | WEIGHT: 171.7 LBS | DIASTOLIC BLOOD PRESSURE: 68 MMHG

## 2023-05-17 PROBLEM — J18.9 PNEUMONIA OF LEFT LOWER LOBE DUE TO INFECTIOUS ORGANISM: Status: ACTIVE | Noted: 2023-05-17

## 2023-05-17 LAB
ANION GAP SERPL CALCULATED.3IONS-SCNC: 6.6 MMOL/L (ref 5–15)
BASOPHILS # BLD AUTO: 0.03 10*3/MM3 (ref 0–0.2)
BASOPHILS NFR BLD AUTO: 0.4 % (ref 0–1.5)
BUN SERPL-MCNC: 12 MG/DL (ref 8–23)
BUN/CREAT SERPL: 24 (ref 7–25)
CALCIUM SPEC-SCNC: 8.7 MG/DL (ref 8.6–10.5)
CHLORIDE SERPL-SCNC: 108 MMOL/L (ref 98–107)
CO2 SERPL-SCNC: 22.4 MMOL/L (ref 22–29)
CREAT SERPL-MCNC: 0.5 MG/DL (ref 0.76–1.27)
DEPRECATED RDW RBC AUTO: 39.9 FL (ref 37–54)
EGFRCR SERPLBLD CKD-EPI 2021: 111.8 ML/MIN/1.73
EOSINOPHIL # BLD AUTO: 0.34 10*3/MM3 (ref 0–0.4)
EOSINOPHIL NFR BLD AUTO: 4.5 % (ref 0.3–6.2)
ERYTHROCYTE [DISTWIDTH] IN BLOOD BY AUTOMATED COUNT: 12.5 % (ref 12.3–15.4)
GLUCOSE BLDC GLUCOMTR-MCNC: 138 MG/DL (ref 70–130)
GLUCOSE BLDC GLUCOMTR-MCNC: 140 MG/DL (ref 70–130)
GLUCOSE SERPL-MCNC: 136 MG/DL (ref 65–99)
HCT VFR BLD AUTO: 42.5 % (ref 37.5–51)
HGB BLD-MCNC: 14 G/DL (ref 13–17.7)
IMM GRANULOCYTES # BLD AUTO: 0.03 10*3/MM3 (ref 0–0.05)
IMM GRANULOCYTES NFR BLD AUTO: 0.4 % (ref 0–0.5)
LYMPHOCYTES # BLD AUTO: 1.04 10*3/MM3 (ref 0.7–3.1)
LYMPHOCYTES NFR BLD AUTO: 13.6 % (ref 19.6–45.3)
MCH RBC QN AUTO: 28.9 PG (ref 26.6–33)
MCHC RBC AUTO-ENTMCNC: 32.9 G/DL (ref 31.5–35.7)
MCV RBC AUTO: 87.8 FL (ref 79–97)
MONOCYTES # BLD AUTO: 0.6 10*3/MM3 (ref 0.1–0.9)
MONOCYTES NFR BLD AUTO: 7.9 % (ref 5–12)
NEUTROPHILS NFR BLD AUTO: 5.6 10*3/MM3 (ref 1.7–7)
NEUTROPHILS NFR BLD AUTO: 73.2 % (ref 42.7–76)
NRBC BLD AUTO-RTO: 0 /100 WBC (ref 0–0.2)
PLATELET # BLD AUTO: 181 10*3/MM3 (ref 140–450)
PMV BLD AUTO: 11.6 FL (ref 6–12)
POTASSIUM SERPL-SCNC: 3.8 MMOL/L (ref 3.5–5.2)
RBC # BLD AUTO: 4.84 10*6/MM3 (ref 4.14–5.8)
SARS-COV-2 RNA RESP QL NAA+PROBE: NOT DETECTED
SODIUM SERPL-SCNC: 137 MMOL/L (ref 136–145)
WBC NRBC COR # BLD: 7.64 10*3/MM3 (ref 3.4–10.8)

## 2023-05-17 PROCEDURE — 80048 BASIC METABOLIC PNL TOTAL CA: CPT | Performed by: HOSPITALIST

## 2023-05-17 PROCEDURE — 87635 SARS-COV-2 COVID-19 AMP PRB: CPT | Performed by: HOSPITALIST

## 2023-05-17 PROCEDURE — 85025 COMPLETE CBC W/AUTO DIFF WBC: CPT | Performed by: HOSPITALIST

## 2023-05-17 PROCEDURE — 82948 REAGENT STRIP/BLOOD GLUCOSE: CPT

## 2023-05-17 PROCEDURE — G0378 HOSPITAL OBSERVATION PER HR: HCPCS

## 2023-05-17 RX ORDER — IPRATROPIUM BROMIDE AND ALBUTEROL SULFATE 2.5; .5 MG/3ML; MG/3ML
3 SOLUTION RESPIRATORY (INHALATION) EVERY 4 HOURS PRN
Qty: 360 ML
Start: 2023-05-17

## 2023-05-17 RX ADMIN — ASPIRIN 81 MG: 81 TABLET, CHEWABLE ORAL at 08:07

## 2023-05-17 RX ADMIN — GLYCOPYRROLATE 1 MG: 1 TABLET ORAL at 08:07

## 2023-05-17 RX ADMIN — Medication 10 ML: at 08:08

## 2023-05-17 RX ADMIN — CARVEDILOL 3.12 MG: 3.12 TABLET, FILM COATED ORAL at 08:07

## 2023-05-17 RX ADMIN — ATORVASTATIN CALCIUM 20 MG: 20 TABLET, FILM COATED ORAL at 08:07

## 2023-05-17 RX ADMIN — LANSOPRAZOLE 30 MG: 15 TABLET, ORALLY DISINTEGRATING, DELAYED RELEASE ORAL at 06:20

## 2023-05-17 RX ADMIN — ACETAMINOPHEN 650 MG: 325 TABLET, FILM COATED ORAL at 08:07

## 2023-05-17 NOTE — CASE MANAGEMENT/SOCIAL WORK
Case Management Discharge Note      Final Note: DC back to IC level bed at [facility],    Provided Post Acute Provider List?: N/A  Provided Post Acute Provider Quality & Resource List?: N/A    Selected Continued Care - Discharged on 5/17/2023 Admission date: 5/16/2023 - Discharge disposition: Skilled Nursing Facility (DC - External)    Destination Coordination complete.    Service Provider Selected Services Address Phone Fax Patient Preferred    [place] [phone] --          Durable Medical Equipment    No services have been selected for the patient.              Dialysis/Infusion    No services have been selected for the patient.              Home Medical Care    No services have been selected for the patient.              Therapy    No services have been selected for the patient.              Community Resources    No services have been selected for the patient.              Community & DME    No services have been selected for the patient.                  Transportation Services  W/C Van: [facility]    Final Discharge Disposition Code: 04 - intermediate care facility ([facility]

## 2023-05-17 NOTE — DISCHARGE PLACEMENT REQUEST
"Adalgisa Bahena (67 y.o. Male)     Date of Birth   1955    Social Security Number       Address   [address] [address]    Home Phone   [phone]    MRN   [id number]       [facility]    Marital Status                               Admission Date   5/16/23    Admission Type   Emergency    Admitting Provider   Shashank Todd MD    Attending Provider   Shashank Todd MD    Department, Room/Bed   [address], [address]/1       Discharge Date       Discharge Disposition       Discharge Destination                               Attending Provider: Shashank Todd MD    Allergies: Bupropion    Isolation: None   Infection: None   Code Status: CPR    Ht: 180.3 cm (71\")   Wt: 77.9 kg (171 lb 11.2 oz)    Admission Cmt: None   Principal Problem: Gastrostomy tube dysfunction [K94.23]                 Active Insurance as of 5/16/2023     Primary Coverage     Payor Plan Insurance Group Employer/Plan Group    MEDICARE MEDICARE A & B      Payor Plan Address Payor Plan Phone Number Payor Plan Fax Number Effective Dates    [address] [phone]  12/1/2011 - None Entered    [facility] [id number]       Subscriber Name Subscriber Birth Date Member ID       ADALGISA BAHENA 1955 [id number]           Secondary Coverage     Payor Plan Insurance Group Employer/Plan Group    [place]      Payor Plan Address Payor Plan Phone Number Payor Plan Fax Number Effective Dates    [address] [phone]  1/16/2017 - None Entered    [place] [address]       Subscriber Name Subscriber Birth Date Member ID       ADALGISA BAHENA 1955 [id number]                 Emergency Contacts      (Rel.) Home Phone Work Phone Mobile Phone    MODYLAN (Son) [phone] -- [phone]    MODEBBIE (Daughter) [phone] -- [phone]    MOTORI (Brother) [phone] -- [phone]              "

## 2023-05-17 NOTE — DISCHARGE SUMMARY
Patient Name: Aquiles Bahena  : 1955  MRN: [id number]    Date of Admission: 2023  Date of Discharge:  2023  Primary Care Physician: Lali Fink MD      Chief Complaint:   g-tube replacement and Cough      Discharge Diagnoses     Active Hospital Problems    Diagnosis  POA   • **Gastrostomy tube dysfunction [K94.23]  Unknown   • Pneumonia of left lower lobe due to infectious organism [J18.9]  Yes   • Anoxic brain injury [G93.1]  Unknown   • Dementia [F03.90]  Unknown   • Dysphagia [R13.10]  Unknown   • GERD (gastroesophageal reflux disease) [K21.9]  Unknown   • CAD (coronary artery disease) [I25.10]  Unknown   • Left lower lobe pneumonia [J18.9]  Unknown   • BPH (benign prostatic hyperplasia) [N40.0]  Unknown   • HTN (hypertension) [I10]  Unknown   • HLD (hyperlipidemia) [E78.5]  Unknown      Resolved Hospital Problems   No resolved problems to display.        Hospital Course     Mr. Bahena is a 67 y.o. male with a history of prior anoxic brain injury, hypertension, BPH, coronary artery disease who presented to [facility] initially complaining of G-tube malfunction and cough.  Please see the admitting history and physical for further details.  He was found to have clogged G-tube and possible pneumonia and was admitted to the hospital for further evaluation and treatment.  He did not have a significant leukocytosis and procalcitonin was negative.  X-ray was more consistent with atelectasis.  He was given antibiotics in the emergency room but these were not continued on admission.  I think that the changes on x-ray and cough are probably related to atelectasis.  Surgery evaluated his G-tube and did replace this at the bedside.  He was initiated on tube feeds and has tolerated these overnight.  At this point he stable to discharge back to the nursing facility for ongoing care and evaluation.      Day of Discharge     Subjective:  Wants to get back to the nursing home  today.    Physical Exam:  Temp:  [97.5 °F (36.4 °C)-98.3 °F (36.8 °C)] 97.6 °F (36.4 °C)  Heart Rate:  [73-82] 73  Resp:  [14-18] 14  BP: ()/(59-79) 102/68  Body mass index is 23.95 kg/m².  Physical Exam  Vitals and nursing note reviewed.   Constitutional:       Appearance: Normal appearance.      Comments: Chronic ill-appearing   Cardiovascular:      Rate and Rhythm: Normal rate and regular rhythm.   Pulmonary:      Effort: No respiratory distress.      Breath sounds: Normal breath sounds.   Abdominal:      Palpations: Abdomen is soft.   Neurological:      Mental Status: He is alert. Mental status is at baseline.         Consultants     Consult Orders (all) (From admission, onward)     Start     Ordered    05/16/23 1236  Inpatient Nutrition Consult  Once        Provider:  (Not yet assigned)    05/16/23 1236    05/16/23 0246  Inpatient General Surgery Consult  Once        Specialty:  General Surgery  Provider:  Abdullahi Garza MD    05/16/23 0248    05/16/23 0219  [facility] (on-call MD unless specified) Details  Once        Specialty:  Hospitalist  Provider:  Verenice Lozano APRN    05/16/23 0218              Procedures     * Surgery not found *      Imaging Results (All)     Procedure Component Value Units Date/Time    XR Chest 2 View [[id number]] Collected: 05/15/23 2056     Updated: 05/15/23 2100    Narrative:      XR CHEST 2 VW-     HISTORY: Male who is 67 years-old,  cough     TECHNIQUE: Frontal and lateral views of the chest     COMPARISON: None available     FINDINGS: Assessment is limited by difficulty with patient positioning.  The heart size is borderline. Mild atelectasis or infiltrate is apparent  in the left lower lung. No large pleural effusion. No pneumothorax.  Right hemidiaphragm appears elevated. No acute osseous process.       Impression:      Mild atelectasis or infiltrate in the left lower lung.  Borderline heart size.     This report was finalized on 5/15/2023 8:57 PM by Dr. Cesario HENDERSON  DEMETRIO Handley.               Pertinent Labs     Results from last 7 days   Lab Units 05/17/23  0545 05/16/23  0527 05/16/23  0102   WBC 10*3/mm3 7.64 7.82 8.22   HEMOGLOBIN g/dL 14.0 14.0 15.0   PLATELETS 10*3/mm3 181 192 216     Results from last 7 days   Lab Units 05/17/23  0545 05/16/23  0527 05/16/23  0102   SODIUM mmol/L 137 140 146*   POTASSIUM mmol/L 3.8 3.9 3.8   CHLORIDE mmol/L 108* 109* 105   CO2 mmol/L 22.4 24.3 27.7   BUN mg/dL 12 10 13   CREATININE mg/dL 0.50* 0.53* 0.64*   GLUCOSE mg/dL 136* 114* 104*   EGFR mL/min/1.73 111.8 109.8 103.8     Results from last 7 days   Lab Units 05/16/23  0102   ALBUMIN g/dL 4.1   BILIRUBIN mg/dL 0.6   ALK PHOS U/L 140*   AST (SGOT) U/L 21   ALT (SGPT) U/L 25     Results from last 7 days   Lab Units 05/17/23  0545 05/16/23  0527 05/16/23  0102   CALCIUM mg/dL 8.7 8.4* 9.6   ALBUMIN g/dL  --   --  4.1               Invalid input(s): LDLCALC  Results from last 7 days   Lab Units 05/16/23  0828 05/16/23  0102   BLOODCX   --  No growth at 24 hours  No growth at 24 hours   MRSAPCR  No MRSA Detected  --      Results from last 7 days   Lab Units 05/15/23  2047   COVID19  Not Detected       Test Results Pending at Discharge     Pending Labs     Order Current Status    Blood Culture - Blood, Arm, Left Preliminary result    Blood Culture - Blood, Arm, Right Preliminary result          Discharge Details        Discharge Medications      New Medications      Instructions Start Date   ipratropium-albuterol 0.5-2.5 mg/3 ml nebulizer  Commonly known as: DUO-NEB   3 mL, Nebulization, Every 4 Hours PRN         Continue These Medications      Instructions Start Date   acetaminophen 500 MG tablet  Commonly known as: TYLENOL   500 mg, Per G Tube, Every 4 Hours PRN      Aspirin 81 MG capsule   Per G Tube, Daily      carvedilol 3.125 MG tablet  Commonly known as: COREG   3.125 mg, Per G Tube, 2 Times Daily With Meals      donepezil 10 MG tablet  Commonly known as: ARICEPT   10 mg, Per G  Tube, Nightly      glycopyrrolate 1 MG tablet  Commonly known as: ROBINUL   1 mg, Per G Tube, 2 Times Daily      loperamide 2 MG capsule  Commonly known as: IMODIUM   2 mg, Per G Tube, Every 6 Hours PRN      omeprazole 40 MG capsule  Commonly known as: priLOSEC   40 mg, Per G Tube, Daily      polyvinyl alcohol 1.4 % ophthalmic solution  Commonly known as: LIQUIFILM   1 drop, Right Eye, Every 4 Hours PRN      simvastatin 40 MG tablet  Commonly known as: ZOCOR   40 mg, Per G Tube, Nightly      tamsulosin 0.4 MG capsule 24 hr capsule  Commonly known as: FLOMAX   1 capsule, Daily      traZODone 50 MG tablet  Commonly known as: DESYREL   50 mg, Per G Tube, Nightly         Stop These Medications    pantoprazole 40 MG EC tablet  Commonly known as: PROTONIX     potassium chloride 20 MEQ tablet controlled-release ER tablet  Commonly known as: K-DUR,KLOR-CON     potassium chloride 20 mEq/15 mL solution  Commonly known as: KAYCIEL            Allergies   Allergen Reactions   • Bupropion Other (See Comments)       Discharge Disposition:  Skilled Nursing Facility (DC - External)      Discharge Diet:  Diet Order   Procedures   • NPO Diet NPO Type: Strict NPO       Discharge Activity:   Activity Instructions     Activity as Tolerated            CODE STATUS:    Code Status and Medical Interventions:   Ordered at: 05/16/23 0248     Code Status (Patient has no pulse and is not breathing):    CPR (Attempt to Resuscitate)     Medical Interventions (Patient has pulse or is breathing):    Full Support       No future appointments.  Additional Instructions for the Follow-ups that You Need to Schedule     Discharge Follow-up with PCP   As directed       Currently Documented PCP:    Lali Fink MD    PCP Phone Number:    [phone]     Follow Up Details: 1-2 weeks            Contact information for follow-up providers     Lali Fink MD .    Specialty: Family Medicine  Why: 1-2 weeks  Contact information:  [id number]  [facility] [address]  [facility] [address]  [phone]                   Contact information for after-discharge care     Destination     [facility] .    Service: Nursing Home  Contact information:  [address]  [facility] [address] [phone]                             Additional Instructions for the Follow-ups that You Need to Schedule     Discharge Follow-up with PCP   As directed       Currently Documented PCP:    Lali Fink MD    PCP Phone Number:    [phone]     Follow Up Details: 1-2 weeks           Time Spent on Discharge:  Greater than 30 minutes      Shashank Todd MD  [facility]  05/17/23  09:09 EDT

## 2023-05-17 NOTE — CASE MANAGEMENT/SOCIAL WORK
Continued Stay Note  [facility]     Patient Name: Aquiles Bahena  MRN: [id number]  Today's Date: 5/17/2023    Admit Date: 5/16/2023    [facility]   Discharge Plan     Row Name 05/17/23 0949       Plan    Plan Comments DC orders noted.  S/W [facility], bed remains available.  Pt's son Valentino notified of DC today and is in agreement.  DC summary and DC packet given to RN.  Kenyatta mccoy arranged for today at 1400. Reservation # [id number].    Final Note DC back to  level bed at [facility],    Row Name 05/17/23 0907       Plan    Plan Return to [facility].    Plan Comments Admitted from [facility].  [facility] bed hold.  S/w pt and his son Valentino ([phone]) who confirm plan is to return to [facility] and anticipating DC today. .........Edwige KULKARNI/ RADHA               Discharge Codes    No documentation.               Expected Discharge Date and Time     Expected Discharge Date Expected Discharge Time    May 17, 2023             Edwige Livingston, GIOVANY

## 2023-05-17 NOTE — CASE MANAGEMENT/SOCIAL WORK
Discharge Planning Assessment  [facility]     Patient Name: Aquiles Bahena  MRN: [id number]  Today's Date: 5/17/2023    Admit Date: 5/16/2023    Plan: Return to [facility].   Discharge Needs Assessment     Row Name 05/17/23 0904       Living Environment    People in Home facility resident    Current Living Arrangements extended care facility    Provides Primary Care For no one, unable/limited ability to care for self    Family Caregiver if Needed child(bridget), adult    Able to Return to Prior Arrangements yes       Transition Planning    Patient/Family Anticipates Transition to long-term care facility    Patient/Family Anticipated Services at Transition skilled nursing       Discharge Needs Assessment    Readmission Within the Last 30 Days no previous admission in last 30 days    Concerns to be Addressed discharge planning    Equipment Needed After Discharge none    Provided Post Acute Provider List? N/A    Provided Post Acute Provider Quality & Resource List? N/A               Discharge Plan     Row Name 05/17/23 0907       Plan    Plan Return to [facility].    Plan Comments Admitted from [facility].  S/W [facility] - pt is from a long term care bed with a bed hold.  S/w pt and his son Valentino ([phone]) who confirm plan is to return to [facility] and anticipating DC today. .........Edwige KULKARNI/ RADHA              Continued Care and Services - Admitted Since 5/16/2023     Destination Coordination complete.    Service Provider Request Status Selected Services Address Phone Fax Patient Preferred    [facility] [address] [place] [address] [phone] --              Expected Discharge Date and Time     Expected Discharge Date Expected Discharge Time    May 17, 2023          Demographic Summary     Row Name 05/17/23 0904       General Information    Admission Type observation    Arrived From long-term care    Referral  Source admission list    Reason for Consult discharge planning    Preferred Language English               Functional Status     Row Name 05/17/23 0904       Functional Status, IADL    Medications completely dependent    Meal Preparation completely dependent    Housekeeping completely dependent    Laundry completely dependent    Shopping completely dependent                         Edwige Livingston RN

## 2023-05-17 NOTE — PLAN OF CARE
Problem: Fall Injury Risk  Goal: Absence of Fall and Fall-Related Injury  Outcome: Adequate for Care Transition  Intervention: Identify and Manage Contributors  Recent Flowsheet Documentation  Taken 5/17/2023 0800 by Tommy Godoy RN  Medication Review/Management: medications reviewed  Intervention: Promote Injury-Free Environment  Recent Flowsheet Documentation  Taken 5/17/2023 0800 by Tommy Godoy RN  Safety Promotion/Fall Prevention: safety round/check completed     Problem: Skin Injury Risk Increased  Goal: Skin Health and Integrity  Outcome: Adequate for Care Transition  Intervention: Optimize Skin Protection  Recent Flowsheet Documentation  Taken 5/17/2023 0800 by Tommy Godoy RN  Pressure Reduction Techniques:   frequent weight shift encouraged   weight shift assistance provided   pressure points protected  Head of Bed (HOB) Positioning: HOB at 30-45 degrees  Pressure Reduction Devices: pressure-redistributing mattress utilized  Skin Protection:   adhesive use limited   transparent dressing maintained     Problem: Aspiration (Enteral Nutrition)  Goal: Absence of Aspiration Signs and Symptoms  Outcome: Adequate for Care Transition  Intervention: Minimize Aspiration Risk  Recent Flowsheet Documentation  Taken 5/17/2023 0800 by Tommy Godoy RN  Head of Bed (HOB) Positioning: HOB at 30-45 degrees  Oral Care:   mouth wash rinse   oral rinse provided   swabbed with antiseptic solution   swabbed with sterile water   teeth brushed   tongue brushed  Enteral Feeding Safety:   placement checked   tube marked at exit site     Problem: Device-Related Complication Risk (Enteral Nutrition)  Goal: Safe, Effective Therapy Delivery  Outcome: Adequate for Care Transition  Intervention: Prevent Feeding-Related Adverse Events  Recent Flowsheet Documentation  Taken 5/17/2023 0800 by Tommy Godoy RN  Enteral Feeding Safety:   placement checked   tube marked at exit site     Problem: Feeding Intolerance (Enteral  Nutrition)  Goal: Feeding Tolerance  Outcome: Adequate for Care Transition  Intervention: Prevent and Manage Feeding Intolerance  Recent Flowsheet Documentation  Taken 5/17/2023 0800 by Tommy Godoy, RN  Nutrition Support Management: tube feeding initiated   Goal Outcome Evaluation:         Cooperative with care, A&Ox2, yes/no answers, vss, nsr, ra, nicolás, tube feeds maintained through g-tube, q2turn, bath today, d/c today. No distress/needs at this time. Iv access maintained until d/c following covid swab. From [facility].

## 2023-05-21 LAB
BACTERIA SPEC AEROBE CULT: NORMAL
BACTERIA SPEC AEROBE CULT: NORMAL

## 2023-06-11 ENCOUNTER — APPOINTMENT (OUTPATIENT)
Dept: GENERAL RADIOLOGY | Facility: HOSPITAL | Age: 68
DRG: 177 | End: 2023-06-11
Payer: MEDICARE

## 2023-06-11 ENCOUNTER — HOSPITAL ENCOUNTER (INPATIENT)
Facility: HOSPITAL | Age: 68
LOS: 4 days | Discharge: NURSING, REHAB OR LONG-TERM CARE | DRG: 177 | End: 2023-06-15
Attending: EMERGENCY MEDICINE | Admitting: INTERNAL MEDICINE
Payer: MEDICARE

## 2023-06-11 DIAGNOSIS — R09.02 HYPOXIA: ICD-10-CM

## 2023-06-11 DIAGNOSIS — J69.0 ASPIRATION PNEUMONIA OF LEFT LOWER LOBE, UNSPECIFIED ASPIRATION PNEUMONIA TYPE: Primary | ICD-10-CM

## 2023-06-11 LAB
ALBUMIN SERPL-MCNC: 3.8 G/DL (ref 3.5–5.2)
ALBUMIN/GLOB SERPL: 1 G/DL
ALP SERPL-CCNC: 143 U/L (ref 39–117)
ALT SERPL W P-5'-P-CCNC: 32 U/L (ref 1–41)
ANION GAP SERPL CALCULATED.3IONS-SCNC: 8.9 MMOL/L (ref 5–15)
ARTERIAL PATENCY WRIST A: POSITIVE
AST SERPL-CCNC: 30 U/L (ref 1–40)
ATMOSPHERIC PRESS: 740.5 MMHG
B PARAPERT DNA SPEC QL NAA+PROBE: NOT DETECTED
B PERT DNA SPEC QL NAA+PROBE: NOT DETECTED
BASE EXCESS BLDA CALC-SCNC: -0.7 MMOL/L (ref 0–2)
BASOPHILS # BLD AUTO: 0.04 10*3/MM3 (ref 0–0.2)
BASOPHILS NFR BLD AUTO: 0.3 % (ref 0–1.5)
BDY SITE: ABNORMAL
BILIRUB SERPL-MCNC: 0.8 MG/DL (ref 0–1.2)
BUN SERPL-MCNC: 20 MG/DL (ref 8–23)
BUN/CREAT SERPL: 25 (ref 7–25)
C PNEUM DNA NPH QL NAA+NON-PROBE: NOT DETECTED
CALCIUM SPEC-SCNC: 9.4 MG/DL (ref 8.6–10.5)
CHLORIDE SERPL-SCNC: 106 MMOL/L (ref 98–107)
CO2 SERPL-SCNC: 22.1 MMOL/L (ref 22–29)
CREAT SERPL-MCNC: 0.8 MG/DL (ref 0.76–1.27)
D-LACTATE SERPL-SCNC: 2.2 MMOL/L (ref 0.5–2)
D-LACTATE SERPL-SCNC: 2.3 MMOL/L (ref 0.5–2)
DEPRECATED RDW RBC AUTO: 41.8 FL (ref 37–54)
EGFRCR SERPLBLD CKD-EPI 2021: 97 ML/MIN/1.73
EOSINOPHIL # BLD AUTO: 0.07 10*3/MM3 (ref 0–0.4)
EOSINOPHIL NFR BLD AUTO: 0.6 % (ref 0.3–6.2)
ERYTHROCYTE [DISTWIDTH] IN BLOOD BY AUTOMATED COUNT: 13 % (ref 12.3–15.4)
FLUAV SUBTYP SPEC NAA+PROBE: NOT DETECTED
FLUBV RNA ISLT QL NAA+PROBE: NOT DETECTED
GAS FLOW AIRWAY: 15 LPM
GEN 5 2HR TROPONIN T REFLEX: 15 NG/L
GLOBULIN UR ELPH-MCNC: 3.9 GM/DL
GLUCOSE SERPL-MCNC: 187 MG/DL (ref 65–99)
HADV DNA SPEC NAA+PROBE: NOT DETECTED
HCO3 BLDA-SCNC: 24.2 MMOL/L (ref 22–28)
HCOV 229E RNA SPEC QL NAA+PROBE: NOT DETECTED
HCOV HKU1 RNA SPEC QL NAA+PROBE: NOT DETECTED
HCOV NL63 RNA SPEC QL NAA+PROBE: NOT DETECTED
HCOV OC43 RNA SPEC QL NAA+PROBE: NOT DETECTED
HCT VFR BLD AUTO: 45.8 % (ref 37.5–51)
HGB BLD-MCNC: 14.9 G/DL (ref 13–17.7)
HMPV RNA NPH QL NAA+NON-PROBE: NOT DETECTED
HPIV1 RNA ISLT QL NAA+PROBE: NOT DETECTED
HPIV2 RNA SPEC QL NAA+PROBE: NOT DETECTED
HPIV3 RNA NPH QL NAA+PROBE: NOT DETECTED
HPIV4 P GENE NPH QL NAA+PROBE: NOT DETECTED
IMM GRANULOCYTES # BLD AUTO: 0.04 10*3/MM3 (ref 0–0.05)
IMM GRANULOCYTES NFR BLD AUTO: 0.3 % (ref 0–0.5)
LYMPHOCYTES # BLD AUTO: 1.17 10*3/MM3 (ref 0.7–3.1)
LYMPHOCYTES NFR BLD AUTO: 9.4 % (ref 19.6–45.3)
M PNEUMO IGG SER IA-ACNC: NOT DETECTED
MCH RBC QN AUTO: 28.7 PG (ref 26.6–33)
MCHC RBC AUTO-ENTMCNC: 32.5 G/DL (ref 31.5–35.7)
MCV RBC AUTO: 88.2 FL (ref 79–97)
MODALITY: ABNORMAL
MONOCYTES # BLD AUTO: 0.42 10*3/MM3 (ref 0.1–0.9)
MONOCYTES NFR BLD AUTO: 3.4 % (ref 5–12)
NEUTROPHILS NFR BLD AUTO: 10.66 10*3/MM3 (ref 1.7–7)
NEUTROPHILS NFR BLD AUTO: 86 % (ref 42.7–76)
NRBC BLD AUTO-RTO: 0 /100 WBC (ref 0–0.2)
NT-PROBNP SERPL-MCNC: 265 PG/ML (ref 0–900)
PCO2 BLDA: 40.3 MM HG (ref 35–45)
PH BLDA: 7.39 PH UNITS (ref 7.35–7.45)
PLATELET # BLD AUTO: 239 10*3/MM3 (ref 140–450)
PMV BLD AUTO: 10.9 FL (ref 6–12)
PO2 BLDA: 94.3 MM HG (ref 80–100)
POTASSIUM SERPL-SCNC: 4.9 MMOL/L (ref 3.5–5.2)
PROCALCITONIN SERPL-MCNC: 0.06 NG/ML (ref 0–0.25)
PROT SERPL-MCNC: 7.7 G/DL (ref 6–8.5)
QT INTERVAL: 432 MS
RBC # BLD AUTO: 5.19 10*6/MM3 (ref 4.14–5.8)
RHINOVIRUS RNA SPEC NAA+PROBE: NOT DETECTED
RSV RNA NPH QL NAA+NON-PROBE: NOT DETECTED
SAO2 % BLDCOA: 97.2 % (ref 92–99)
SARS-COV-2 RNA NPH QL NAA+NON-PROBE: NOT DETECTED
SODIUM SERPL-SCNC: 137 MMOL/L (ref 136–145)
TOTAL RATE: 22 BREATHS/MINUTE
TROPONIN T DELTA: -1 NG/L
TROPONIN T SERPL HS-MCNC: 16 NG/L
VENTILATOR MODE: ABNORMAL
WBC NRBC COR # BLD: 12.4 10*3/MM3 (ref 3.4–10.8)

## 2023-06-11 PROCEDURE — 93005 ELECTROCARDIOGRAM TRACING: CPT | Performed by: EMERGENCY MEDICINE

## 2023-06-11 PROCEDURE — 36600 WITHDRAWAL OF ARTERIAL BLOOD: CPT

## 2023-06-11 PROCEDURE — 87147 CULTURE TYPE IMMUNOLOGIC: CPT | Performed by: EMERGENCY MEDICINE

## 2023-06-11 PROCEDURE — 87040 BLOOD CULTURE FOR BACTERIA: CPT | Performed by: EMERGENCY MEDICINE

## 2023-06-11 PROCEDURE — 80053 COMPREHEN METABOLIC PANEL: CPT | Performed by: EMERGENCY MEDICINE

## 2023-06-11 PROCEDURE — 36415 COLL VENOUS BLD VENIPUNCTURE: CPT | Performed by: EMERGENCY MEDICINE

## 2023-06-11 PROCEDURE — 94761 N-INVAS EAR/PLS OXIMETRY MLT: CPT

## 2023-06-11 PROCEDURE — 25010000002 PIPERACILLIN SOD-TAZOBACTAM PER 1 G: Performed by: INTERNAL MEDICINE

## 2023-06-11 PROCEDURE — 94799 UNLISTED PULMONARY SVC/PX: CPT

## 2023-06-11 PROCEDURE — 25010000002 PIPERACILLIN SOD-TAZOBACTAM PER 1 G: Performed by: EMERGENCY MEDICINE

## 2023-06-11 PROCEDURE — 84484 ASSAY OF TROPONIN QUANT: CPT | Performed by: EMERGENCY MEDICINE

## 2023-06-11 PROCEDURE — 0202U NFCT DS 22 TRGT SARS-COV-2: CPT | Performed by: EMERGENCY MEDICINE

## 2023-06-11 PROCEDURE — 83880 ASSAY OF NATRIURETIC PEPTIDE: CPT | Performed by: EMERGENCY MEDICINE

## 2023-06-11 PROCEDURE — 85025 COMPLETE CBC W/AUTO DIFF WBC: CPT | Performed by: EMERGENCY MEDICINE

## 2023-06-11 PROCEDURE — 99285 EMERGENCY DEPT VISIT HI MDM: CPT

## 2023-06-11 PROCEDURE — 87150 DNA/RNA AMPLIFIED PROBE: CPT | Performed by: EMERGENCY MEDICINE

## 2023-06-11 PROCEDURE — 94640 AIRWAY INHALATION TREATMENT: CPT

## 2023-06-11 PROCEDURE — 82803 BLOOD GASES ANY COMBINATION: CPT

## 2023-06-11 PROCEDURE — 71045 X-RAY EXAM CHEST 1 VIEW: CPT

## 2023-06-11 PROCEDURE — 93010 ELECTROCARDIOGRAM REPORT: CPT | Performed by: INTERNAL MEDICINE

## 2023-06-11 PROCEDURE — 84145 PROCALCITONIN (PCT): CPT | Performed by: EMERGENCY MEDICINE

## 2023-06-11 PROCEDURE — 83605 ASSAY OF LACTIC ACID: CPT | Performed by: EMERGENCY MEDICINE

## 2023-06-11 RX ORDER — POLYETHYLENE GLYCOL 3350 17 G/17G
17 POWDER, FOR SOLUTION ORAL DAILY PRN
Status: DISCONTINUED | OUTPATIENT
Start: 2023-06-11 | End: 2023-06-12

## 2023-06-11 RX ORDER — TRAZODONE HYDROCHLORIDE 50 MG/1
50 TABLET ORAL NIGHTLY
Status: DISCONTINUED | OUTPATIENT
Start: 2023-06-11 | End: 2023-06-15 | Stop reason: HOSPADM

## 2023-06-11 RX ORDER — AMOXICILLIN 250 MG
2 CAPSULE ORAL 2 TIMES DAILY
Status: DISCONTINUED | OUTPATIENT
Start: 2023-06-11 | End: 2023-06-12

## 2023-06-11 RX ORDER — UREA 10 %
3 LOTION (ML) TOPICAL NIGHTLY PRN
Status: DISCONTINUED | OUTPATIENT
Start: 2023-06-11 | End: 2023-06-15 | Stop reason: HOSPADM

## 2023-06-11 RX ORDER — CARVEDILOL 3.12 MG/1
3.12 TABLET ORAL 2 TIMES DAILY WITH MEALS
Status: DISCONTINUED | OUTPATIENT
Start: 2023-06-11 | End: 2023-06-13

## 2023-06-11 RX ORDER — TAMSULOSIN HYDROCHLORIDE 0.4 MG/1
0.4 CAPSULE ORAL NIGHTLY
Status: DISCONTINUED | OUTPATIENT
Start: 2023-06-11 | End: 2023-06-12

## 2023-06-11 RX ORDER — ASPIRIN 81 MG/1
81 TABLET, CHEWABLE ORAL DAILY
Status: DISCONTINUED | OUTPATIENT
Start: 2023-06-11 | End: 2023-06-15 | Stop reason: HOSPADM

## 2023-06-11 RX ORDER — SODIUM CHLORIDE 9 MG/ML
75 INJECTION, SOLUTION INTRAVENOUS CONTINUOUS
Status: DISCONTINUED | OUTPATIENT
Start: 2023-06-11 | End: 2023-06-14

## 2023-06-11 RX ORDER — ATORVASTATIN CALCIUM 20 MG/1
20 TABLET, FILM COATED ORAL NIGHTLY
Status: DISCONTINUED | OUTPATIENT
Start: 2023-06-11 | End: 2023-06-12

## 2023-06-11 RX ORDER — BISACODYL 10 MG
10 SUPPOSITORY, RECTAL RECTAL DAILY PRN
Status: DISCONTINUED | OUTPATIENT
Start: 2023-06-11 | End: 2023-06-12

## 2023-06-11 RX ORDER — BISACODYL 5 MG/1
5 TABLET, DELAYED RELEASE ORAL DAILY PRN
Status: DISCONTINUED | OUTPATIENT
Start: 2023-06-11 | End: 2023-06-12

## 2023-06-11 RX ORDER — ONDANSETRON 4 MG/1
4 TABLET, FILM COATED ORAL EVERY 6 HOURS PRN
Status: DISCONTINUED | OUTPATIENT
Start: 2023-06-11 | End: 2023-06-15 | Stop reason: HOSPADM

## 2023-06-11 RX ORDER — ONDANSETRON 2 MG/ML
4 INJECTION INTRAMUSCULAR; INTRAVENOUS EVERY 6 HOURS PRN
Status: DISCONTINUED | OUTPATIENT
Start: 2023-06-11 | End: 2023-06-15 | Stop reason: HOSPADM

## 2023-06-11 RX ORDER — DONEPEZIL HYDROCHLORIDE 10 MG/1
10 TABLET, FILM COATED ORAL NIGHTLY
Status: DISCONTINUED | OUTPATIENT
Start: 2023-06-11 | End: 2023-06-15 | Stop reason: HOSPADM

## 2023-06-11 RX ORDER — GLYCOPYRROLATE 1 MG/1
1 TABLET ORAL 2 TIMES DAILY
Status: DISCONTINUED | OUTPATIENT
Start: 2023-06-11 | End: 2023-06-15 | Stop reason: HOSPADM

## 2023-06-11 RX ORDER — ACETAMINOPHEN 325 MG/1
650 TABLET ORAL EVERY 4 HOURS PRN
Status: DISCONTINUED | OUTPATIENT
Start: 2023-06-11 | End: 2023-06-15 | Stop reason: HOSPADM

## 2023-06-11 RX ORDER — IPRATROPIUM BROMIDE AND ALBUTEROL SULFATE 2.5; .5 MG/3ML; MG/3ML
3 SOLUTION RESPIRATORY (INHALATION) EVERY 4 HOURS PRN
Status: DISCONTINUED | OUTPATIENT
Start: 2023-06-11 | End: 2023-06-15 | Stop reason: HOSPADM

## 2023-06-11 RX ADMIN — TAZOBACTAM SODIUM AND PIPERACILLIN SODIUM 3.38 G: 375; 3 INJECTION, SOLUTION INTRAVENOUS at 13:58

## 2023-06-11 RX ADMIN — ASPIRIN 81 MG: 81 TABLET, CHEWABLE ORAL at 21:05

## 2023-06-11 RX ADMIN — TRAZODONE HYDROCHLORIDE 50 MG: 50 TABLET ORAL at 21:05

## 2023-06-11 RX ADMIN — TAMSULOSIN HYDROCHLORIDE 0.4 MG: 0.4 CAPSULE ORAL at 21:05

## 2023-06-11 RX ADMIN — PIPERACILLIN SODIUM AND TAZOBACTAM SODIUM 3.38 G: 3; .375 INJECTION, SOLUTION INTRAVENOUS at 21:05

## 2023-06-11 RX ADMIN — DONEPEZIL HYDROCHLORIDE 10 MG: 10 TABLET, FILM COATED ORAL at 21:05

## 2023-06-11 RX ADMIN — GLYCOPYRROLATE 1 MG: 1 TABLET ORAL at 21:08

## 2023-06-11 RX ADMIN — DOCUSATE SODIUM 50MG AND SENNOSIDES 8.6MG 2 TABLET: 8.6; 5 TABLET, FILM COATED ORAL at 21:05

## 2023-06-11 RX ADMIN — ATORVASTATIN CALCIUM 20 MG: 20 TABLET, FILM COATED ORAL at 21:05

## 2023-06-11 RX ADMIN — IPRATROPIUM BROMIDE AND ALBUTEROL SULFATE 3 ML: .5; 2.5 SOLUTION RESPIRATORY (INHALATION) at 21:34

## 2023-06-11 RX ADMIN — CARVEDILOL 3.12 MG: 3.12 TABLET, FILM COATED ORAL at 21:05

## 2023-06-11 RX ADMIN — SODIUM CHLORIDE 75 ML/HR: 9 INJECTION, SOLUTION INTRAVENOUS at 21:08

## 2023-06-11 NOTE — ED PROVIDER NOTES
EMERGENCY DEPARTMENT ENCOUNTER    Room Number:  29/29  PCP: Lali Fink MD  Historian: EMS      HPI:  Chief Complaint: Shortness of breath  A complete HPI/ROS/PMH/PSH/SH/FH are unobtainable due to: Dementia  Context: Aquiles Bahena is a 67 y.o. male who presents to the ED c/o shortness of breath.  Patient from nursing home.  Patient is tube fed.  Patient apparently was given yogurt today.  Presents for shortness of breath.  Patient hypoxic.  Patient is DNR per order sheet.  Patient can answer yes/no questions.            PAST MEDICAL HISTORY  Active Ambulatory Problems     Diagnosis Date Noted    Anoxic brain injury 05/16/2023    Dementia 05/16/2023    Dysphagia 05/16/2023    GERD (gastroesophageal reflux disease) 05/16/2023    CAD (coronary artery disease) 05/16/2023    Left lower lobe pneumonia 05/16/2023    Gastrostomy tube dysfunction 05/16/2023    BPH (benign prostatic hyperplasia) 05/16/2023    HTN (hypertension) 05/16/2023    HLD (hyperlipidemia) 05/16/2023    Pneumonia of left lower lobe due to infectious organism 05/17/2023     Resolved Ambulatory Problems     Diagnosis Date Noted    No Resolved Ambulatory Problems     Past Medical History:   Diagnosis Date    Anoxic brain damage     Anxiety     Arthritis     Coronary artery disease     COVID-19     Hyperlipidemia     Hypotension     Smoking          PAST SURGICAL HISTORY  Past Surgical History:   Procedure Laterality Date    GTUBE INSERTION           FAMILY HISTORY  History reviewed. No pertinent family history.      SOCIAL HISTORY  Social History     Socioeconomic History    Marital status:    Tobacco Use    Smoking status: Unknown     Passive exposure: Past   Vaping Use    Vaping Use: Unknown   Substance and Sexual Activity    Alcohol use: Defer    Drug use: Defer    Sexual activity: Defer         ALLERGIES  Bupropion        REVIEW OF SYSTEMS  Review of Systems   Shortness of breat      PHYSICAL EXAM  ED Triage Vitals   Temp Heart Rate  Resp BP SpO2   06/11/23 1225 06/11/23 1222 06/11/23 1225 06/11/23 1225 06/11/23 1222   96.3 °F (35.7 °C) 85 (!) 30 151/96 (!) 89 %      Temp src Heart Rate Source Patient Position BP Location FiO2 (%)   -- -- -- -- --              Physical Exam      GENERAL: Patient with moderate distress and audible lung sounds  HENT: nares patent  EYES: no scleral icterus  CV: regular rhythm, normal rate  RESPIRATORY: Increased respiratory effort.  Rales bilaterally  ABDOMEN: soft.  G-tube in place  MUSCULOSKELETAL: no deformity  NEURO: alert, moves all extremities, follows commands  PSYCH:  calm, cooperative  SKIN: warm, dry    Vital signs and nursing notes reviewed.          LAB RESULTS  Recent Results (from the past 24 hour(s))   Comprehensive Metabolic Panel    Collection Time: 06/11/23 12:26 PM    Specimen: Blood   Result Value Ref Range    Glucose 187 (H) 65 - 99 mg/dL    BUN 20 8 - 23 mg/dL    Creatinine 0.80 0.76 - 1.27 mg/dL    Sodium 137 136 - 145 mmol/L    Potassium 4.9 3.5 - 5.2 mmol/L    Chloride 106 98 - 107 mmol/L    CO2 22.1 22.0 - 29.0 mmol/L    Calcium 9.4 8.6 - 10.5 mg/dL    Total Protein 7.7 6.0 - 8.5 g/dL    Albumin 3.8 3.5 - 5.2 g/dL    ALT (SGPT) 32 1 - 41 U/L    AST (SGOT) 30 1 - 40 U/L    Alkaline Phosphatase 143 (H) 39 - 117 U/L    Total Bilirubin 0.8 0.0 - 1.2 mg/dL    Globulin 3.9 gm/dL    A/G Ratio 1.0 g/dL    BUN/Creatinine Ratio 25.0 7.0 - 25.0    Anion Gap 8.9 5.0 - 15.0 mmol/L    eGFR 97.0 >60.0 mL/min/1.73   BNP    Collection Time: 06/11/23 12:26 PM    Specimen: Blood   Result Value Ref Range    proBNP 265.0 0.0 - 900.0 pg/mL   High Sensitivity Troponin T    Collection Time: 06/11/23 12:26 PM    Specimen: Blood   Result Value Ref Range    HS Troponin T 16 (H) <15 ng/L   CBC Auto Differential    Collection Time: 06/11/23 12:26 PM    Specimen: Blood   Result Value Ref Range    WBC 12.40 (H) 3.40 - 10.80 10*3/mm3    RBC 5.19 4.14 - 5.80 10*6/mm3    Hemoglobin 14.9 13.0 - 17.7 g/dL    Hematocrit 45.8  37.5 - 51.0 %    MCV 88.2 79.0 - 97.0 fL    MCH 28.7 26.6 - 33.0 pg    MCHC 32.5 31.5 - 35.7 g/dL    RDW 13.0 12.3 - 15.4 %    RDW-SD 41.8 37.0 - 54.0 fl    MPV 10.9 6.0 - 12.0 fL    Platelets 239 140 - 450 10*3/mm3    Neutrophil % 86.0 (H) 42.7 - 76.0 %    Lymphocyte % 9.4 (L) 19.6 - 45.3 %    Monocyte % 3.4 (L) 5.0 - 12.0 %    Eosinophil % 0.6 0.3 - 6.2 %    Basophil % 0.3 0.0 - 1.5 %    Immature Grans % 0.3 0.0 - 0.5 %    Neutrophils, Absolute 10.66 (H) 1.70 - 7.00 10*3/mm3    Lymphocytes, Absolute 1.17 0.70 - 3.10 10*3/mm3    Monocytes, Absolute 0.42 0.10 - 0.90 10*3/mm3    Eosinophils, Absolute 0.07 0.00 - 0.40 10*3/mm3    Basophils, Absolute 0.04 0.00 - 0.20 10*3/mm3    Immature Grans, Absolute 0.04 0.00 - 0.05 10*3/mm3    nRBC 0.0 0.0 - 0.2 /100 WBC   Procalcitonin    Collection Time: 06/11/23 12:26 PM    Specimen: Blood   Result Value Ref Range    Procalcitonin 0.06 0.00 - 0.25 ng/mL   Lactic Acid, Plasma    Collection Time: 06/11/23 12:28 PM    Specimen: Blood   Result Value Ref Range    Lactate 2.2 (C) 0.5 - 2.0 mmol/L   ECG 12 Lead Dyspnea    Collection Time: 06/11/23 12:32 PM   Result Value Ref Range    QT Interval 432 ms   Blood Gas, Arterial -    Collection Time: 06/11/23 12:59 PM    Specimen: Arterial Blood   Result Value Ref Range    Site Arterial: right radial     Lorenzo's Test Positive     pH, Arterial 7.388 7.350 - 7.450 pH units    pCO2, Arterial 40.3 35.0 - 45.0 mm Hg    pO2, Arterial 94.3 80.0 - 100.0 mm Hg    HCO3, Arterial 24.2 22.0 - 28.0 mmol/L    Base Excess, Arterial -0.7 (L) 0.0 - 2.0 mmol/L    O2 Saturation Calculated 97.2 92.0 - 99.0 %    Barometric Pressure for Blood Gas 740.5 mmHg    Modality NRB     Flow Rate 15 lpm    Ventilator Mode NA     Rate 22 Breaths/minute   Respiratory Panel PCR w/COVID-19(SARS-CoV-2) MADAN/ANITA/ZULMA/PAD/COR/MAD/BREANN In-House, NP Swab in [facility], 3-4 HR TAT - Swab, Nasopharynx    Collection Time: 06/11/23  1:01 PM    Specimen: Nasopharynx; Swab   Result Value Ref  Range    ADENOVIRUS, PCR Not Detected Not Detected    Coronavirus 229E Not Detected Not Detected    Coronavirus HKU1 Not Detected Not Detected    Coronavirus NL63 Not Detected Not Detected    Coronavirus OC43 Not Detected Not Detected    COVID19 Not Detected Not Detected - Ref. Range    Human Metapneumovirus Not Detected Not Detected    Human Rhinovirus/Enterovirus Not Detected Not Detected    Influenza A PCR Not Detected Not Detected    Influenza B PCR Not Detected Not Detected    Parainfluenza Virus 1 Not Detected Not Detected    Parainfluenza Virus 2 Not Detected Not Detected    Parainfluenza Virus 3 Not Detected Not Detected    Parainfluenza Virus 4 Not Detected Not Detected    RSV, PCR Not Detected Not Detected    Bordetella pertussis pcr Not Detected Not Detected    Bordetella parapertussis PCR Not Detected Not Detected    Chlamydophila pneumoniae PCR Not Detected Not Detected    Mycoplasma pneumo by PCR Not Detected Not Detected       Ordered the above labs and reviewed the results.        RADIOLOGY  XR Chest 1 View    Result Date: 6/11/2023  XR CHEST 1 VW-  CONCLUSION: Shortness of breath  FINDINGS: Mild elevation of the right hemidiaphragm similar to the previous examination. Cardiac size within normal limits. No pleural effusion or pulmonary edema. The right lung is clear. There is a wedge-shaped area of opacity at the left base superimposing the cardiac silhouette, suspect lower lobe infiltrates. The left lung is otherwise clear. The remainder is unremarkable.  This report was finalized on 6/11/2023 1:04 PM by Dr. Sudheer Neal M.D.       Ordered the above noted radiological studies.  Chest x-ray independently interpreted by me and shows no pneumothorax          PROCEDURES  Procedures      EKG          EKG time: 1232  Rhythm/Rate: Normal sinus rhythm 86  P waves and OR: Normal P waves  QRS, axis: Right bundle branch block  ST and T waves: T wave inversions V1 V2 V3    Interpreted Contemporaneously by me,  independently viewed  No prior      MEDICATIONS GIVEN IN ER  Medications   piperacillin-tazobactam (ZOSYN) 3.375 g in iso-osmotic dextrose 50 ml (premix) (0 g Intravenous Stopped 6/11/23 1425)                   MEDICAL DECISION MAKING, PROGRESS, and CONSULTS     Discussion below represents my analysis of pertinent findings related to patient's condition, differential diagnosis, treatment plan and final disposition.      Additional sources:  - Discussed/ obtained information from independent historians: None    - External (non-ED) record review: Epic reviewed and patient admitted for pneumonia 5/16/2023.    - Chronic or social conditions impacting care: None    - Shared decision making: None      Orders placed during this visit:  Orders Placed This Encounter   Procedures    Respiratory Panel PCR w/COVID-19(SARS-CoV-2) MADAN/ANITA/ZULMA/PAD/COR/MAD/BREANN In-House, NP Swab in [facility], 3-4 HR TAT - Swab, Nasopharynx    Blood Culture - Blood,    Blood Culture - Blood,    XR Chest 1 View    Comprehensive Metabolic Panel    BNP    High Sensitivity Troponin T    CBC Auto Differential    Lactic Acid, Plasma    Procalcitonin    Blood Gas, Arterial -    High Sensitivity Troponin T 2Hr    STAT Lactic Acid, Reflex    LHA (on-call MD unless specified) Details    ECG 12 Lead Dyspnea    Inpatient Admission    CBC & Differential         Additional orders considered but not ordered:  None        Differential diagnosis includes but is not limited to:    Aspiration pneumonia versus community-acquired pneumonia versus congestive heart failure      Independent interpretation of labs, radiology studies, and discussions with consultants:  ED Course as of 06/11/23 1503   Sun Jun 11, 2023   1342 13:42 EDT  Patient presents for shortness of breath.  Patient is tube fed and today was found eating yogurt and became short of breath afterwards.  Most likely aspiration.  Patient is DNR according to his paperwork.  Has been suctioned by respiratory with  some improvement.  Has been given Zosyn.  Discussed with Dr. Sanchez who will admit. [SL]      ED Course User Index  [SL] Carlton Jones MD             DIAGNOSIS  Final diagnoses:   Aspiration pneumonia of left lower lobe, unspecified aspiration pneumonia type   Hypoxia         DISPOSITION  admit            Latest Documented Vital Signs:  As of 15:03 EDT  BP- 148/97 HR- 81 Temp- 96.3 °F (35.7 °C) O2 sat- 97%              --    Please note that portions of this were completed with a voice recognition program.       Note Disclaimer: At [facility], we believe that sharing information builds trust and better relationships. You are receiving this note because you are receiving care at [facility] or recently visited. It is possible you will see health information before a provider has talked with you about it. This kind of information can be easy to misunderstand. To help you fully understand what it means for your health, we urge you to discuss this note with your provider.            Carlton Jones MD  06/11/23 0094

## 2023-06-11 NOTE — ED NOTES
Pt arrived via ems from [place] [facility] acute [facility]   Pt is a g-tube only feed but pt was fed orally earlier today. Pt now has crackles and is short of breath.

## 2023-06-11 NOTE — ED NOTES
"Nursing report ED to floor  Aquiles Bahena  67 y.o.  male    HPI :   Chief Complaint   Patient presents with    Shortness of Breath    Aspiration       Admitting doctor:   Laura Sanchez MD    Admitting diagnosis:   The primary encounter diagnosis was Aspiration pneumonia of left lower lobe, unspecified aspiration pneumonia type. A diagnosis of Hypoxia was also pertinent to this visit.    Code status:   Current Code Status       Date Active Code Status Order ID Comments User Context       Prior            Allergies:   Bupropion    Isolation:   No active isolations    Intake and Output  No intake or output data in the 24 hours ending 06/11/23 1728    Weight:       06/11/23  1310   Weight: 77.6 kg (171 lb)       Most recent vitals:   Vitals:    06/11/23 1310 06/11/23 1456 06/11/23 1518 06/11/23 1545   BP:  133/94     BP Location:       Patient Position:       Pulse:  94 90 111   Resp:       Temp:       SpO2:  96% 95% 95%   Weight: 77.6 kg (171 lb)      Height: 180.3 cm (71\")          Active LDAs/IV Access:   Lines, Drains & Airways       Active LDAs       Name Placement date Placement time Site Days    Peripheral IV 06/11/23 1212 Right Antecubital 06/11/23  1212  Antecubital  less than 1    Peripheral IV 06/11/23 Anterior;Left Forearm 06/11/23  --  Forearm  less than 1    Gastrostomy/Enterostomy Gastrostomy 20 Fr. LUQ 05/16/23  1150  LUQ  26                    Labs (abnormal labs have a star):   Labs Reviewed   COMPREHENSIVE METABOLIC PANEL - Abnormal; Notable for the following components:       Result Value    Glucose 187 (*)     Alkaline Phosphatase 143 (*)     All other components within normal limits    Narrative:     GFR Normal >60  Chronic Kidney Disease <60  Kidney Failure <15     TROPONIN - Abnormal; Notable for the following components:    HS Troponin T 16 (*)     All other components within normal limits    Narrative:     High Sensitive Troponin T Reference Range:  <10.0 ng/L- Negative Female for " AMI  <15.0 ng/L- Negative Male for AMI  >=10 - Abnormal Female indicating possible myocardial injury.  >=15 - Abnormal Male indicating possible myocardial injury.   Clinicians would have to utilize clinical acumen, EKG, Troponin, and serial changes to determine if it is an Acute Myocardial Infarction or myocardial injury due to an underlying chronic condition.        CBC WITH AUTO DIFFERENTIAL - Abnormal; Notable for the following components:    WBC 12.40 (*)     Neutrophil % 86.0 (*)     Lymphocyte % 9.4 (*)     Monocyte % 3.4 (*)     Neutrophils, Absolute 10.66 (*)     All other components within normal limits   LACTIC ACID, PLASMA - Abnormal; Notable for the following components:    Lactate 2.2 (*)     All other components within normal limits   BLOOD GAS, ARTERIAL - Abnormal; Notable for the following components:    Base Excess, Arterial -0.7 (*)     All other components within normal limits   RESPIRATORY PANEL PCR W/ COVID-19 (SARS-COV-2) MADAN/ANITA/ZULMA/PAD/COR/MAD/BREANN IN-HOUSE, NP SWAB IN [place], 3-4 HR TAT - Normal    Narrative:     In the setting of a positive respiratory panel with a viral infection PLUS a negative procalcitonin without other underlying concern for bacterial infection, consider observing off antibiotics or discontinuation of antibiotics and continue supportive care. If the respiratory panel is positive for atypical bacterial infection (Bordetella pertussis, Chlamydophila pneumoniae, or Mycoplasma pneumoniae), consider antibiotic de-escalation to target atypical bacterial infection.   BNP (IN-HOUSE) - Normal    Narrative:     Among patients with dyspnea, NT-proBNP is highly sensitive for the detection of acute congestive heart failure. In addition NT-proBNP of <300 pg/ml effectively rules out acute congestive heart failure with 99% negative predictive value.    Results may be falsely decreased if patient taking Biotin.     PROCALCITONIN - Normal    Narrative:     As a Marker for Sepsis  "(Non-Neonates):    1. <0.5 ng/mL represents a low risk of severe sepsis and/or septic shock.  2. >2 ng/mL represents a high risk of severe sepsis and/or septic shock.    As a Marker for Lower Respiratory Tract Infections that require antibiotic therapy:    PCT on Admission    Antibiotic Therapy       6-12 Hrs later    >0.5                Strongly Recommended  >0.25 - <0.5        Recommended   0.1 - 0.25          Discouraged              Remeasure/reassess PCT  <0.1                Strongly Discouraged     Remeasure/reassess PCT    As 28 day mortality risk marker: \"Change in Procalcitonin Result\" (>80% or <=80%) if Day 0 (or Day 1) and Day 4 values are available. Refer to http://www.[employer] Mediapct-calculator.com    Change in PCT <=80%  A decrease of PCT levels below or equal to 80% defines a positive change in PCT test result representing a higher risk for 28-day all-cause mortality of patients diagnosed with severe sepsis for septic shock.    Change in PCT >80%  A decrease of PCT levels of more than 80% defines a negative change in PCT result representing a lower risk for 28-day all-cause mortality of patients diagnosed with severe sepsis or septic shock.      BLOOD CULTURE   BLOOD CULTURE   HIGH SENSITIVITIY TROPONIN T 2HR   LACTIC ACID, REFLEX   LACTIC ACID, REFLEX   CBC AND DIFFERENTIAL    Narrative:     The following orders were created for panel order CBC & Differential.  Procedure                               Abnormality         Status                     ---------                               -----------         ------                     CBC Auto Differential[[id number]]        Abnormal            Final result                 Please view results for these tests on the individual orders.       EKG:   ECG 12 Lead Dyspnea   Final Result   HEART RATE= 86  bpm   RR Interval= 698  ms   CO Interval= 131  ms   P Horizontal Axis= 12  deg   P Front Axis= 48  deg   QRSD Interval= 133  ms   QT Interval= 432  ms   QRS Axis= " -48  deg   T Wave Axis= 49  deg   - ABNORMAL ECG -   Sinus rhythm   Right bundle branch block   No Prior Tracing for Comparison   Electronically Signed By: Casper Sterling ([facility]) 11-Jun-2023 13:01:45   Date and Time of Study: 2023-06-11 12:32:10          Meds given in ED:   Medications   piperacillin-tazobactam (ZOSYN) 3.375 g in iso-osmotic dextrose 50 ml (premix) (0 g Intravenous Stopped 6/11/23 7241)       Imaging results:  No radiology results for the last day    Ambulatory status:   - Up with assist x2    Social issues:   Social History     Socioeconomic History    Marital status:    Tobacco Use    Smoking status: Unknown     Passive exposure: Past   Vaping Use    Vaping Use: Unknown   Substance and Sexual Activity    Alcohol use: Defer    Drug use: Defer    Sexual activity: Defer       NIH Stroke Scale:         Juan Schumacher RN  06/11/23 17:28 EDT

## 2023-06-11 NOTE — H&P
HISTORY AND PHYSICAL   [facility]        Date of Admission: 2023  Patient Identification:  Name: Aquiles Bahena  Age: 67 y.o.  Sex: male  :  1955  MRN: [id number]                     Primary Care Physician: Lali Fnik MD    Chief Complaint:  67 year old gentleman who presented to the emergency room from a nursing home with low oxygen sats and cough; he was also short of air; he has a history of anoxic brain injury and is on tube feeds but was given food today; a brother is at the bedside; the patient is nonverbal and not able to contribute to the history; he is able to nod and shake his head    History of Present Illness:   As above    Past Medical History:  Past Medical History:   Diagnosis Date    Anoxic brain damage     Anxiety     Arthritis     BPH (benign prostatic hyperplasia)     Coronary artery disease     COVID-19     Dementia     Dysphagia     GERD (gastroesophageal reflux disease)     Hyperlipidemia     Hypotension     Smoking      Past Surgical History:  Past Surgical History:   Procedure Laterality Date    GTUBE INSERTION        Home Meds:  Medications Prior to Admission   Medication Sig Dispense Refill Last Dose    carvedilol (COREG) 3.125 MG tablet Administer 1 tablet per G tube 2 (Two) Times a Day With Meals.   2023    donepezil (ARICEPT) 10 MG tablet Administer 1 tablet per G tube Every Night.   6/10/2023    simvastatin (ZOCOR) 40 MG tablet Administer 1 tablet per G tube Every Night.   6/10/2023    traZODone (DESYREL) 50 MG tablet Administer 1 tablet per G tube Every Night.   6/10/2023    acetaminophen (TYLENOL) 500 MG tablet Administer 1 tablet per G tube Every 4 (Four) Hours As Needed for Mild Pain.   Unknown    Aspirin 81 MG capsule Administer  per G tube Daily.   Unknown    glycopyrrolate (ROBINUL) 1 MG tablet Administer 1 tablet per G tube 2 (Two) Times a Day.   Unknown    ipratropium-albuterol (DUO-NEB) 0.5-2.5 mg/3 ml nebulizer Take 3 mL by nebulization  Every 4 (Four) Hours As Needed for Wheezing or Shortness of Air. 360 mL  Unknown    loperamide (IMODIUM) 2 MG capsule Administer 1 capsule per G tube Every 6 (Six) Hours As Needed for Diarrhea.   Unknown    omeprazole (priLOSEC) 40 MG capsule Administer 1 capsule per G tube Daily.   Unknown    polyvinyl alcohol (LIQUIFILM) 1.4 % ophthalmic solution Administer 1 drop to the right eye Every 4 (Four) Hours As Needed for Dry Eyes.   Unknown    tamsulosin (FLOMAX) 0.4 MG capsule 24 hr capsule 1 capsule Daily.   Unknown       Allergies:  Allergies   Allergen Reactions    Bupropion Other (See Comments)     Immunizations:  Immunization History   Administered Date(s) Administered    31-influenza Vac Quardvalent Preservativ 10/18/2015    COVID-19 (PFIZER) Purple Cap Monovalent 2020, 2021, 2021    FluLaval/Fluzone >6mos 2016    Fluzone High Dose =>65 Years ([employer] ONLY) 10/30/2014    Influenza Quad Vaccine (Inpatient) 2012    Influenza Seasonal Injectable 10/15/2010    Influenza, Unspecified 2009, 2011, 2012, 2013, 10/30/2014, 2015    Pneumococcal Polysaccharide (PPSV23) 2009     Social History:   Social History     Social History Narrative    Not on file     Social History     Socioeconomic History    Marital status:    Tobacco Use    Smoking status: Unknown     Passive exposure: Past   Vaping Use    Vaping Use: Unknown   Substance and Sexual Activity    Alcohol use: Defer    Drug use: Defer    Sexual activity: Defer       Family History:  History reviewed. No pertinent family history.     Review of Systems  Not obtainable  Objective:  T Max 24 hrs: Temp (24hrs), Av.3 °F (35.7 °C), Min:96.3 °F (35.7 °C), Max:96.3 °F (35.7 °C)    Vitals Ranges:   Temp:  [96.3 °F (35.7 °C)] 96.3 °F (35.7 °C)  Heart Rate:  [] 114  Resp:  [20-30] 20  BP: (133-151)/() 144/102      Exam:  BP (!) 144/102   Pulse 114   Temp 96.3 °F (35.7 °C)   Resp 20   Ht  "180.3 cm (71\")   Wt 77.6 kg (171 lb)   SpO2 98%   BMI 23.85 kg/m²     General Appearance:    drowsy, chronically ill appearing, no distress, appears stated age   Head:    Normocephalic, without obvious abnormality, atraumatic   Eyes:    PERRL, conjunctivae/corneas clear, EOM's intact, both eyes   Ears:    Normal external ear canals, both ears   Nose:   Nares normal, septum midline, mucosa normal, no drainage    or sinus tenderness   Throat:   Lips, mucosa, and tongue normal   Neck:   Supple, symmetrical, trachea midline, no adenopathy;     thyroid:  no enlargement/tenderness/nodules; no carotid    bruit or JVD   Back:     Symmetric, no curvature, ROM normal, no CVA tenderness   Lungs:     Decreased breath sounds. rhonchi bilaterally, respirations unlabored   Chest Wall:    No tenderness or deformity    Heart:    Regular rate and rhythm, S1 and S2 normal, no murmur, rub   or gallop   Abdomen:     Soft, nontender, bowel sounds active all four quadrants,     no masses, no hepatomegaly, no splenomegaly   Extremities:   Extremities normal, atraumatic, no cyanosis or edema   Pulses:   2+ and symmetric all extremities   Skin:   Skin color, texture, turgor normal, no rashes or lesions               .    Data Review:  Labs in chart were reviewed.  WBC   Date Value Ref Range Status   06/11/2023 12.40 (H) 3.40 - 10.80 10*3/mm3 Final     Hemoglobin   Date Value Ref Range Status   06/11/2023 14.9 13.0 - 17.7 g/dL Final     Hematocrit   Date Value Ref Range Status   06/11/2023 45.8 37.5 - 51.0 % Final     Platelets   Date Value Ref Range Status   06/11/2023 239 140 - 450 10*3/mm3 Final     Sodium   Date Value Ref Range Status   06/11/2023 137 136 - 145 mmol/L Final     Potassium   Date Value Ref Range Status   06/11/2023 4.9 3.5 - 5.2 mmol/L Final     Comment:     Specimen hemolyzed.  Results may be affected.     Chloride   Date Value Ref Range Status   06/11/2023 106 98 - 107 mmol/L Final     CO2   Date Value Ref Range Status "   06/11/2023 22.1 22.0 - 29.0 mmol/L Final     BUN   Date Value Ref Range Status   06/11/2023 20 8 - 23 mg/dL Final     Creatinine   Date Value Ref Range Status   06/11/2023 0.80 0.76 - 1.27 mg/dL Final     Glucose   Date Value Ref Range Status   06/11/2023 187 (H) 65 - 99 mg/dL Final     Calcium   Date Value Ref Range Status   06/11/2023 9.4 8.6 - 10.5 mg/dL Final     AST (SGOT)   Date Value Ref Range Status   06/11/2023 30 1 - 40 U/L Final     Comment:     Specimen hemolyzed.  Results may be affected.     ALT (SGPT)   Date Value Ref Range Status   06/11/2023 32 1 - 41 U/L Final     Comment:     Specimen hemolyzed.  Results may be affected.     Alkaline Phosphatase   Date Value Ref Range Status   06/11/2023 143 (H) 39 - 117 U/L Final                Imaging Results (All)       Procedure Component Value Units Date/Time    XR Chest 1 View [[id number]] Collected: 06/11/23 1303     Updated: 06/11/23 1307    Narrative:      XR CHEST 1 VW-     CONCLUSION: Shortness of breath     FINDINGS: Mild elevation of the right hemidiaphragm similar to the  previous examination. Cardiac size within normal limits. No pleural  effusion or pulmonary edema. The right lung is clear. There is a  wedge-shaped area of opacity at the left base superimposing the cardiac  silhouette, suspect lower lobe infiltrates. The left lung is otherwise  clear. The remainder is unremarkable.     This report was finalized on 6/11/2023 1:04 PM by Dr. Sudheer Neal M.D.                 Assessment:  Active Hospital Problems    Diagnosis  POA    **Aspiration pneumonia of left lower lobe, unspecified aspiration pneumonia type [J69.0]  Yes      Resolved Hospital Problems   No resolved problems to display.   Acute hypoxic respiratory failure  Anoxic brain injury poa  Dementia  Cad  Hypertension  Dysphagia  hyperglycemia    Plan:  Continue antibiotics,mininebs  Patient is dnr  Monitor on telemetry  Wean oxygen as tolerated  BERNIEw family, patient and ed  provider    Laura Sanchez MD  6/11/2023  18:51 EDT

## 2023-06-12 PROBLEM — R09.02 HYPOXIA: Status: ACTIVE | Noted: 2023-06-12

## 2023-06-12 LAB
ANION GAP SERPL CALCULATED.3IONS-SCNC: 12.2 MMOL/L (ref 5–15)
BUN SERPL-MCNC: 22 MG/DL (ref 8–23)
BUN/CREAT SERPL: 26.5 (ref 7–25)
CALCIUM SPEC-SCNC: 9.1 MG/DL (ref 8.6–10.5)
CHLORIDE SERPL-SCNC: 107 MMOL/L (ref 98–107)
CO2 SERPL-SCNC: 18.8 MMOL/L (ref 22–29)
CREAT SERPL-MCNC: 0.83 MG/DL (ref 0.76–1.27)
D-LACTATE SERPL-SCNC: 2 MMOL/L (ref 0.5–2)
D-LACTATE SERPL-SCNC: 2.5 MMOL/L (ref 0.5–2)
D-LACTATE SERPL-SCNC: 3.3 MMOL/L (ref 0.5–2)
DEPRECATED RDW RBC AUTO: 41.9 FL (ref 37–54)
EGFRCR SERPLBLD CKD-EPI 2021: 95.9 ML/MIN/1.73
ERYTHROCYTE [DISTWIDTH] IN BLOOD BY AUTOMATED COUNT: 13 % (ref 12.3–15.4)
GLUCOSE BLDC GLUCOMTR-MCNC: 106 MG/DL (ref 70–130)
GLUCOSE BLDC GLUCOMTR-MCNC: 134 MG/DL (ref 70–130)
GLUCOSE BLDC GLUCOMTR-MCNC: 149 MG/DL (ref 70–130)
GLUCOSE SERPL-MCNC: 196 MG/DL (ref 65–99)
HCT VFR BLD AUTO: 45.9 % (ref 37.5–51)
HGB BLD-MCNC: 15 G/DL (ref 13–17.7)
MCH RBC QN AUTO: 28.6 PG (ref 26.6–33)
MCHC RBC AUTO-ENTMCNC: 32.7 G/DL (ref 31.5–35.7)
MCV RBC AUTO: 87.6 FL (ref 79–97)
MRSA DNA SPEC QL NAA+PROBE: NORMAL
PLATELET # BLD AUTO: 231 10*3/MM3 (ref 140–450)
PMV BLD AUTO: 10.9 FL (ref 6–12)
POTASSIUM SERPL-SCNC: 4.1 MMOL/L (ref 3.5–5.2)
RBC # BLD AUTO: 5.24 10*6/MM3 (ref 4.14–5.8)
SODIUM SERPL-SCNC: 138 MMOL/L (ref 136–145)
WBC NRBC COR # BLD: 14.26 10*3/MM3 (ref 3.4–10.8)

## 2023-06-12 PROCEDURE — 80048 BASIC METABOLIC PNL TOTAL CA: CPT | Performed by: INTERNAL MEDICINE

## 2023-06-12 PROCEDURE — 82948 REAGENT STRIP/BLOOD GLUCOSE: CPT

## 2023-06-12 PROCEDURE — 87641 MR-STAPH DNA AMP PROBE: CPT | Performed by: NURSE PRACTITIONER

## 2023-06-12 PROCEDURE — 85027 COMPLETE CBC AUTOMATED: CPT | Performed by: INTERNAL MEDICINE

## 2023-06-12 PROCEDURE — 83605 ASSAY OF LACTIC ACID: CPT | Performed by: EMERGENCY MEDICINE

## 2023-06-12 PROCEDURE — 25010000002 PIPERACILLIN SOD-TAZOBACTAM PER 1 G: Performed by: INTERNAL MEDICINE

## 2023-06-12 RX ORDER — TERAZOSIN 1 MG/1
1 CAPSULE ORAL NIGHTLY
Status: DISCONTINUED | OUTPATIENT
Start: 2023-06-12 | End: 2023-06-15 | Stop reason: HOSPADM

## 2023-06-12 RX ORDER — GUAIFENESIN 200 MG/10ML
200 LIQUID ORAL 3 TIMES DAILY
Status: DISCONTINUED | OUTPATIENT
Start: 2023-06-12 | End: 2023-06-15 | Stop reason: HOSPADM

## 2023-06-12 RX ORDER — BISACODYL 5 MG/1
5 TABLET, DELAYED RELEASE ORAL DAILY PRN
Status: DISCONTINUED | OUTPATIENT
Start: 2023-06-12 | End: 2023-06-14

## 2023-06-12 RX ORDER — AMOXICILLIN 250 MG
2 CAPSULE ORAL 2 TIMES DAILY
Status: DISCONTINUED | OUTPATIENT
Start: 2023-06-12 | End: 2023-06-14

## 2023-06-12 RX ORDER — BISACODYL 10 MG
10 SUPPOSITORY, RECTAL RECTAL DAILY PRN
Status: DISCONTINUED | OUTPATIENT
Start: 2023-06-12 | End: 2023-06-14

## 2023-06-12 RX ORDER — POLYETHYLENE GLYCOL 3350 17 G/17G
17 POWDER, FOR SOLUTION ORAL DAILY PRN
Status: DISCONTINUED | OUTPATIENT
Start: 2023-06-12 | End: 2023-06-14

## 2023-06-12 RX ORDER — ATORVASTATIN CALCIUM 20 MG/1
20 TABLET, FILM COATED ORAL NIGHTLY
Status: DISCONTINUED | OUTPATIENT
Start: 2023-06-12 | End: 2023-06-15 | Stop reason: HOSPADM

## 2023-06-12 RX ADMIN — ATORVASTATIN CALCIUM 20 MG: 20 TABLET, FILM COATED ORAL at 20:39

## 2023-06-12 RX ADMIN — PIPERACILLIN SODIUM AND TAZOBACTAM SODIUM 3.38 G: 3; .375 INJECTION, SOLUTION INTRAVENOUS at 20:39

## 2023-06-12 RX ADMIN — GLYCOPYRROLATE 1 MG: 1 TABLET ORAL at 20:38

## 2023-06-12 RX ADMIN — SODIUM CHLORIDE 500 ML: 9 INJECTION, SOLUTION INTRAVENOUS at 02:35

## 2023-06-12 RX ADMIN — DONEPEZIL HYDROCHLORIDE 10 MG: 10 TABLET, FILM COATED ORAL at 20:39

## 2023-06-12 RX ADMIN — TRAZODONE HYDROCHLORIDE 50 MG: 50 TABLET ORAL at 20:39

## 2023-06-12 RX ADMIN — LANSOPRAZOLE 30 MG: 15 TABLET, ORALLY DISINTEGRATING, DELAYED RELEASE ORAL at 05:33

## 2023-06-12 RX ADMIN — GUAIFENESIN 200 MG: 200 SOLUTION ORAL at 20:38

## 2023-06-12 RX ADMIN — CARVEDILOL 3.12 MG: 3.12 TABLET, FILM COATED ORAL at 17:38

## 2023-06-12 RX ADMIN — DOCUSATE SODIUM 50MG AND SENNOSIDES 8.6MG 2 TABLET: 8.6; 5 TABLET, FILM COATED ORAL at 08:00

## 2023-06-12 RX ADMIN — GUAIFENESIN 200 MG: 200 SOLUTION ORAL at 20:22

## 2023-06-12 RX ADMIN — SODIUM CHLORIDE 75 ML/HR: 9 INJECTION, SOLUTION INTRAVENOUS at 20:40

## 2023-06-12 RX ADMIN — TERAZOSIN 1 MG: 1 CAPSULE ORAL at 20:39

## 2023-06-12 RX ADMIN — PIPERACILLIN SODIUM AND TAZOBACTAM SODIUM 3.38 G: 3; .375 INJECTION, SOLUTION INTRAVENOUS at 04:30

## 2023-06-12 RX ADMIN — CARVEDILOL 3.12 MG: 3.12 TABLET, FILM COATED ORAL at 08:00

## 2023-06-12 RX ADMIN — DOCUSATE SODIUM 50MG AND SENNOSIDES 8.6MG 2 TABLET: 8.6; 5 TABLET, FILM COATED ORAL at 20:39

## 2023-06-12 RX ADMIN — GLYCOPYRROLATE 1 MG: 1 TABLET ORAL at 08:00

## 2023-06-12 RX ADMIN — PIPERACILLIN SODIUM AND TAZOBACTAM SODIUM 3.38 G: 3; .375 INJECTION, SOLUTION INTRAVENOUS at 12:41

## 2023-06-12 NOTE — DISCHARGE PLACEMENT REQUEST
"JosefMonicae (67 y.o. Male)       Date of Birth   1955    Social Security Number       Address   [facility] [address] [facility] [address]    Home Phone   [phone]    MRN   [id number]       [facility]    Marital Status                               Admission Date   6/11/23    Admission Type   Emergency    Admitting Provider   Laura Sanchez MD    Attending Provider   Frank Stiles MD    Department, Room/Bed   [address], [address]       Discharge Date       Discharge Disposition       Discharge Destination                                 Attending Provider: Frank Stiles MD    Allergies: Bupropion    Isolation: None   Infection: None   Code Status: No CPR    Ht: 172 cm (67.72\")   Wt: 73.5 kg (162 lb 0.6 oz)    Admission Cmt: None   Principal Problem: Aspiration pneumonia of left lower lobe, unspecified aspiration pneumonia type [J69.0]                   Active Insurance as of 6/11/2023       Primary Coverage       Payor Plan Insurance Group Employer/Plan Group    MEDICARE MEDICARE A & B        Payor Plan Address Payor Plan Phone Number Payor Plan Fax Number Effective Dates    [address] [phone]  12/1/2011 - None Entered    [facility] [id number]         Subscriber Name Subscriber Birth Date Member ID       ADALGISA HASSAN 1955 [id number]               Secondary Coverage       Payor Plan Insurance Group Employer/Plan Group    [place]        Payor Plan Address Payor Plan Phone Number Payor Plan Fax Number Effective Dates    [address] [phone]  1/16/2017 - None Entered    [place] [address]         Subscriber Name Subscriber Birth Date Member ID       ADALGISA HASSAN 1955 [id number]                     Emergency Contacts        (Rel.) Home Phone Work Phone Mobile Phone    JOSEFDYLAN (Son) [phone] -- [phone]    JOSEFDEBBIE (Daughter) [phone] -- [phone]    TORI HASSAN " (Brother) [phone] -- [phone]

## 2023-06-12 NOTE — CONSULTS
"Nutrition Services    Patient Name:  Aquiles Bahena  YOB: 1955  MRN: [id number]  Admit Date:  6/11/2023    Assessment Date:  06/12/23    Comment: Nutrition following for tube feeding assessment. He only answers yes/no questions and relies solely on tube feeds for nutrition. Family was reported to visit him PTA and he was given food po. Patient admitted for O2 desatting and being found to have aspiration pneumonia. Per wt hx, he has lost 9# (5%) x 1 month.    Plans/Recommendation:   Start tube feeds via PEG: Nutren 1.5 @20 ml/hr + 30ml q4 hours FW   Advance as tolerated to goal rate of 50 ml/hr which provides 1800 kcal, 82 g protein, 917 ml fluid + 180 ml FW  Monitor vitals, residuals, and electrolytes (K+, Mg, Phos) for signs of refeeding syndrome   Keep HOB elevated past 30 degrees to avoid aspiration on tube feeds    RD to monitor course and continue to follow       CLINICAL NUTRITION ASSESSMENT      Reason for Assessment Tube Feeding Assessment      Diagnosis/Problem   Aspiration pneumonia of left lower lobe    Medical/Surgical History Past Medical History:   Diagnosis Date    Anoxic brain damage     Anxiety     Arthritis     BPH (benign prostatic hyperplasia)     Coronary artery disease     COVID-19     Dementia     Dysphagia     GERD (gastroesophageal reflux disease)     Hyperlipidemia     Hypotension     Smoking        Past Surgical History:   Procedure Laterality Date    GTUBE INSERTION          Encounter Information        Nutrition History:     Food Preferences:    Supplements:    Factors Affecting Intake: swallow impairment     Anthropometrics        Current Height  Current Weight  BMI kg/m2 Height: 172 cm (67.72\")  Weight: 73.5 kg (162 lb 0.6 oz) (06/12/23 0544)  Body mass index is 24.84 kg/m².   Adjusted BMI (if applicable)    BMI Category Normal/Healthy (18.4 - 24.9)       Admission Weight 162# (73.5 kg)        Ideal Body Weight (IBW) 149# (67.7 kg)   Adjusted IBW (if applicable)        Usual " Body Weight (UBW)    Weight Change/Trend Loss, Amount/Timeframe: 9# (5%) x 1 month       Weight History Wt Readings from Last 30 Encounters:   06/12/23 0544 73.5 kg (162 lb 0.6 oz)   06/11/23 1310 77.6 kg (171 lb)   05/16/23 0825 77.9 kg (171 lb 11.2 oz)   05/16/23 0204 109 kg (240 lb)           --  Estimated/Assessed Needs        Current Weight  Weight: 73.5 kg (162 lb 0.6 oz) (06/12/23 0544)       Energy Requirements    Weight for Calculation 162# (73.5 kg)    Method for Estimation  25 kcal/kg, 30 kcal/kg   EST Needs (kcal/day) 1628-8459       Protein Requirements    Weight for Calculation 162# (73.5 kg)    EST Protein Needs (g/kg) 1.0 - 1.2 gm/kg   EST Daily Needs (g/day) 73-88       Fluid Requirements     Method for Estimation 1 mL/kcal    EST Needs (mL/day)      Tests/Procedures        Tests/Procedures X-Ray     Labs       Pertinent Labs    Results from last 7 days   Lab Units 06/12/23  0141 06/11/23  1226   SODIUM mmol/L 138 137   POTASSIUM mmol/L 4.1 4.9   CHLORIDE mmol/L 107 106   CO2 mmol/L 18.8* 22.1   BUN mg/dL 22 20   CREATININE mg/dL 0.83 0.80   CALCIUM mg/dL 9.1 9.4   BILIRUBIN mg/dL  --  0.8   ALK PHOS U/L  --  143*   ALT (SGPT) U/L  --  32   AST (SGOT) U/L  --  30   GLUCOSE mg/dL 196* 187*        Results from last 7 days   Lab Units 06/12/23  0141 06/11/23  1226   PLATELETS 10*3/mm3 231 239     COVID19   Date Value Ref Range Status   06/11/2023 Not Detected Not Detected - Ref. Range Final     No results found for: HGBA1C       Medications           Scheduled Medications aspirin, 81 mg, Per G Tube, Daily  atorvastatin, 20 mg, Per G Tube, Nightly  carvedilol, 3.125 mg, Per G Tube, BID With Meals  donepezil, 10 mg, Per G Tube, Nightly  glycopyrrolate, 1 mg, Per G Tube, BID  guaifenesin, 200 mg, Per G Tube, TID  lansoprazole, 30 mg, Per G Tube, Q AM  piperacillin-tazobactam, 3.375 g, Intravenous, Q8H  senna-docusate sodium, 2 tablet, Per G Tube, BID  terazosin, 1 mg, Per G Tube, Nightly  traZODone, 50  mg, Per G Tube, Nightly       Infusions sodium chloride, 75 mL/hr, Last Rate: 75 mL/hr (06/11/23 2108)       PRN Medications   acetaminophen    senna-docusate sodium **AND** polyethylene glycol **AND** bisacodyl **AND** bisacodyl    Glycerin-Hypromellose-    ipratropium-albuterol    melatonin    ondansetron **OR** ondansetron     Physical Findings          Physical Appearance alert, flat affect, Other: can only answer yes or no questions    Oral/Mouth Cavity tooth or teeth missing   Edema  no edema   Gastrointestinal fecal incontinence, last bowel movement:6/11   Skin  skin intact   Tubes/Drains/Lines PEG   NFPE Not indicated at this time   --  Current Nutrition Orders & Evaluation of Intake       Oral Nutrition     Food Allergies NKFA   Current PO Diet NPO Diet NPO Type: Strict NPO   Supplement n/a   PO Evaluation     % PO Intake     # of Days Evaluated    --  PES STATEMENT / NUTRITION DIAGNOSIS      Nutrition Dx Problem  Problem: Unintentional Weight Loss  Etiology: Medical Diagnosisaspiration pneumonia   Signs/Symptoms: Unintended Weight Change    Comment:    --  NUTRITION INTERVENTION / PLAN OF CARE      Intervention Goal(s) Nutrition support treatment, Meet estimated needs, Initiate TF/PN, Tolerate TF/PN at goal, and Maintain weight         RD Intervention/Action Follow Tx Progress and Care plan reviewed         Prescription/Orders:       PO Diet       Supplements       Snacks       Enteral Nutrition       Parenteral Nutrition    New Prescription Ordered? Yes   --   Enteral Prescription:     Enteral Route PEG    TF Delivery Method Continuous    Enteral Product Nutren 1.5    Modular None    Propofol Rate/Kcal     TF Start Rate (mL/hr) 20    TF Goal Rate (mL/hr) 50    Free Water Flush (mL) 30ml q4 hours     TF Provision at Goal: 1800 kcal, 82 g protein, 917 ml fluid + 180 ml FW         Calories 98% needs met         Protein  100 % needs met         Fluid (mL) 1097 mL total     Prescription Ordered Yes          Monitor/Evaluation Per protocol, I&O, Pertinent labs, EN delivery/tolerance, Weight, Skin status, GI status, Symptoms, POC/GOC   Discharge Plan/Needs Pending clinical course   Education Education not appropriate at this time   --    RD to follow per protocol.      Electronically signed by:  Dolores Briggs RD  06/12/23 14:59 EDT

## 2023-06-12 NOTE — PROGRESS NOTES
"    Name: Aquiles Bahena ADMIT: 2023   : 1955  PCP: Lali Fink MD    MRN: [id number] LOS: 1 days   AGE/SEX: 67 y.o. male  ROOM: [facility]     Subjective   Subjective   Pt non verbal, shakes head \"no\" when asked if he can talk to me. Brother at bedside. Feels pt is better today. Has occasional rattled cough. Requiring O2, does not use at baseline. Unclear why pt was given anything po as he has been dependent on tube feedings for quite some time       Objective   Objective   Vital Signs  Temp:  [97.8 °F (36.6 °C)-99.9 °F (37.7 °C)] 99.9 °F (37.7 °C)  Heart Rate:  [] 99  Resp:  [18-20] 18  BP: (109-144)/() 109/84  SpO2:  [90 %-100 %] 94 %  on  Flow (L/min):  [5-15] 8;   Device (Oxygen Therapy): humidified;high-flow nasal cannula  Body mass index is 24.84 kg/m².  Physical Exam  Vitals and nursing note reviewed.   Constitutional:       General: He is not in acute distress.     Appearance: He is ill-appearing. He is not toxic-appearing.   HENT:      Head: Normocephalic.      Mouth/Throat:      Mouth: Mucous membranes are moist.   Eyes:      Conjunctiva/sclera: Conjunctivae normal.   Cardiovascular:      Rate and Rhythm: Normal rate and regular rhythm.   Pulmonary:      Effort: Pulmonary effort is normal. No respiratory distress.      Breath sounds: Examination of the right-lower field reveals decreased breath sounds. Examination of the left-lower field reveals decreased breath sounds. Decreased breath sounds present.   Abdominal:      General: Bowel sounds are normal. There is no distension.      Palpations: Abdomen is soft.   Musculoskeletal:      Cervical back: Neck supple.      Right lower leg: No edema.      Left lower leg: No edema.   Skin:     General: Skin is warm and dry.   Neurological:      Mental Status: He is alert. Mental status is at baseline.   Psychiatric:      Comments: Non verbal       Results Review     I reviewed the patient's new clinical results.  Results from last 7 days "   Lab Units 06/12/23  0141 06/11/23  1226   WBC 10*3/mm3 14.26* 12.40*   HEMOGLOBIN g/dL 15.0 14.9   PLATELETS 10*3/mm3 231 239     Results from last 7 days   Lab Units 06/12/23  0141 06/11/23  1226   SODIUM mmol/L 138 137   POTASSIUM mmol/L 4.1 4.9   CHLORIDE mmol/L 107 106   CO2 mmol/L 18.8* 22.1   BUN mg/dL 22 20   CREATININE mg/dL 0.83 0.80   GLUCOSE mg/dL 196* 187*   EGFR mL/min/1.73 95.9 97.0     Results from last 7 days   Lab Units 06/11/23  1226   ALBUMIN g/dL 3.8   BILIRUBIN mg/dL 0.8   ALK PHOS U/L 143*   AST (SGOT) U/L 30   ALT (SGPT) U/L 32     Results from last 7 days   Lab Units 06/12/23  0141 06/11/23  1226   CALCIUM mg/dL 9.1 9.4   ALBUMIN g/dL  --  3.8     Results from last 7 days   Lab Units 06/12/23  1028 06/12/23  0431 06/12/23  0141 06/11/23  2247 06/11/23  1228 06/11/23  1226   PROCALCITONIN ng/mL  --   --   --   --   --  0.06   LACTATE mmol/L 2.0 2.5* 3.3* 2.3*   < >  --     < > = values in this interval not displayed.     Glucose   Date/Time Value Ref Range Status   06/12/2023 1102 149 (H) 70 - 130 mg/dL Final     Comment:     Meter: [id number] : [id number] Tamika [facility]       No radiology results for the last day  I have personally reviewed all medications:  Scheduled Medications  aspirin, 81 mg, Per G Tube, Daily  atorvastatin, 20 mg, Oral, Nightly  carvedilol, 3.125 mg, Per G Tube, BID With Meals  donepezil, 10 mg, Per G Tube, Nightly  glycopyrrolate, 1 mg, Per G Tube, BID  lansoprazole, 30 mg, Per G Tube, Q AM  piperacillin-tazobactam, 3.375 g, Intravenous, Q8H  senna-docusate sodium, 2 tablet, Oral, BID  tamsulosin, 0.4 mg, Oral, Nightly  traZODone, 50 mg, Per G Tube, Nightly    Infusions  sodium chloride, 75 mL/hr, Last Rate: 75 mL/hr (06/11/23 2108)    Diet  NPO Diet NPO Type: Strict NPO    I have personally reviewed:  [x]  Laboratory   [x]  Microbiology   [x]  Radiology   [x]  EKG/Telemetry  [x]  Cardiology/Vascular   []  Pathology    []  Records       Assessment/Plan     Active  Hospital Problems    Diagnosis  POA   • **Aspiration pneumonia of left lower lobe, unspecified aspiration pneumonia type [J69.0]  Yes   • Hypoxia [R09.02]  Yes   • Anoxic brain injury [G93.1]  Yes   • Dysphagia [R13.10]  Yes   • GERD (gastroesophageal reflux disease) [K21.9]  Yes   • CAD (coronary artery disease) [I25.10]  Yes   • HTN (hypertension) [I10]  Yes      Resolved Hospital Problems   No resolved problems to display.       67 y.o. male admitted with Aspiration pneumonia of left lower lobe, unspecified aspiration pneumonia type.    Aspiration pneumonia LLL/Dysphagia/Hypoxia:  -NPO. Nutrition consulted for TF management. Continue antibiotics. Check MRSA screen. Follow cultures. Trend WBC. Wean O2 as tolerated. Add mucolytic. Lactate has normalized. RVP neg. ABG unremarkable    CURTIS:  -At baseline; no behavioral issues    GERD:  -PPI    HTN:  -BP acceptable acutely, monitor    · Pharmacy to dose Lovenox for DVT prophylaxis.  · Limited code (no CPR, no intubation).  · Discussed with patient and family.  · Anticipate discharge to SNU facility in 2-3 days..      IDRIS Vargehse  [facility]  06/12/23  13:03 EDT

## 2023-06-12 NOTE — CASE MANAGEMENT/SOCIAL WORK
Continued Stay Note  [facility]     Patient Name: Aquiles Bahena  MRN: [id number]  Today's Date: 6/12/2023    Admit Date: 6/11/2023    Plan: [facility] Medicaid bed hold   Discharge Plan       Row Name 06/12/23 1557       Plan    Plan [facility] Medicaid bed hold    Patient/Family in Agreement with Plan yes    Plan Comments CCP placed outbound call to Valentino, pts son, d/t pts confusion. Introduced self and role of CCP. Face sheet information and pharmacy verified. Pt lives at [facility]. CCP discussed transportation at [facility]; Valentino is agreeable to stretcher van at [facility]. [facility] notified pt at [facility]. Pt is a [facility] Medicaid bed hold and can return anytime, pt will need a negative covid swab to return. Partial packet in CCP office. [place] ADAIR/CCP                   Discharge Codes    No documentation.                 Expected Discharge Date and Time       Expected Discharge Date Expected Discharge Time    Noe 15, 2023               Nidhi Echavarria RN

## 2023-06-12 NOTE — NURSING NOTE
Made three attempts to obtain paperwork from [facility]. Unable to get through to anyone. Due to this PTA med list wasn't completed.

## 2023-06-13 LAB
ANION GAP SERPL CALCULATED.3IONS-SCNC: 7 MMOL/L (ref 5–15)
BACTERIA BLD CULT: ABNORMAL
BASOPHILS # BLD AUTO: 0.03 10*3/MM3 (ref 0–0.2)
BASOPHILS NFR BLD AUTO: 0.3 % (ref 0–1.5)
BOTTLE TYPE: ABNORMAL
BUN SERPL-MCNC: 22 MG/DL (ref 8–23)
BUN/CREAT SERPL: 32.4 (ref 7–25)
CALCIUM SPEC-SCNC: 8.6 MG/DL (ref 8.6–10.5)
CHLORIDE SERPL-SCNC: 110 MMOL/L (ref 98–107)
CO2 SERPL-SCNC: 26 MMOL/L (ref 22–29)
CREAT SERPL-MCNC: 0.68 MG/DL (ref 0.76–1.27)
DEPRECATED RDW RBC AUTO: 40.7 FL (ref 37–54)
EGFRCR SERPLBLD CKD-EPI 2021: 101.9 ML/MIN/1.73
EOSINOPHIL # BLD AUTO: 0.02 10*3/MM3 (ref 0–0.4)
EOSINOPHIL NFR BLD AUTO: 0.2 % (ref 0.3–6.2)
ERYTHROCYTE [DISTWIDTH] IN BLOOD BY AUTOMATED COUNT: 12.6 % (ref 12.3–15.4)
GLUCOSE BLDC GLUCOMTR-MCNC: 140 MG/DL (ref 70–130)
GLUCOSE BLDC GLUCOMTR-MCNC: 148 MG/DL (ref 70–130)
GLUCOSE BLDC GLUCOMTR-MCNC: 172 MG/DL (ref 70–130)
GLUCOSE BLDC GLUCOMTR-MCNC: 80 MG/DL (ref 70–130)
GLUCOSE SERPL-MCNC: 137 MG/DL (ref 65–99)
HCT VFR BLD AUTO: 38 % (ref 37.5–51)
HGB BLD-MCNC: 12.3 G/DL (ref 13–17.7)
LYMPHOCYTES # BLD AUTO: 0.93 10*3/MM3 (ref 0.7–3.1)
LYMPHOCYTES NFR BLD AUTO: 8.7 % (ref 19.6–45.3)
MCH RBC QN AUTO: 28.5 PG (ref 26.6–33)
MCHC RBC AUTO-ENTMCNC: 32.4 G/DL (ref 31.5–35.7)
MCV RBC AUTO: 88.2 FL (ref 79–97)
MONOCYTES # BLD AUTO: 0.6 10*3/MM3 (ref 0.1–0.9)
MONOCYTES NFR BLD AUTO: 5.6 % (ref 5–12)
NEUTROPHILS NFR BLD AUTO: 84.6 % (ref 42.7–76)
NEUTROPHILS NFR BLD AUTO: 9.08 10*3/MM3 (ref 1.7–7)
PLATELET # BLD AUTO: 164 10*3/MM3 (ref 140–450)
PMV BLD AUTO: 11.4 FL (ref 6–12)
POTASSIUM SERPL-SCNC: 3.4 MMOL/L (ref 3.5–5.2)
RBC # BLD AUTO: 4.31 10*6/MM3 (ref 4.14–5.8)
SODIUM SERPL-SCNC: 143 MMOL/L (ref 136–145)
WBC NRBC COR # BLD: 10.72 10*3/MM3 (ref 3.4–10.8)

## 2023-06-13 PROCEDURE — 85025 COMPLETE CBC W/AUTO DIFF WBC: CPT | Performed by: NURSE PRACTITIONER

## 2023-06-13 PROCEDURE — 25010000002 CEFTRIAXONE PER 250 MG: Performed by: STUDENT IN AN ORGANIZED HEALTH CARE EDUCATION/TRAINING PROGRAM

## 2023-06-13 PROCEDURE — 25010000002 PIPERACILLIN SOD-TAZOBACTAM PER 1 G: Performed by: INTERNAL MEDICINE

## 2023-06-13 PROCEDURE — 25010000002 ENOXAPARIN PER 10 MG: Performed by: STUDENT IN AN ORGANIZED HEALTH CARE EDUCATION/TRAINING PROGRAM

## 2023-06-13 PROCEDURE — 80048 BASIC METABOLIC PNL TOTAL CA: CPT | Performed by: NURSE PRACTITIONER

## 2023-06-13 PROCEDURE — 82948 REAGENT STRIP/BLOOD GLUCOSE: CPT

## 2023-06-13 RX ORDER — ENOXAPARIN SODIUM 100 MG/ML
40 INJECTION SUBCUTANEOUS EVERY 24 HOURS
Status: DISCONTINUED | OUTPATIENT
Start: 2023-06-13 | End: 2023-06-15 | Stop reason: HOSPADM

## 2023-06-13 RX ADMIN — DOCUSATE SODIUM 50MG AND SENNOSIDES 8.6MG 2 TABLET: 8.6; 5 TABLET, FILM COATED ORAL at 20:36

## 2023-06-13 RX ADMIN — GUAIFENESIN 200 MG: 200 SOLUTION ORAL at 09:01

## 2023-06-13 RX ADMIN — DONEPEZIL HYDROCHLORIDE 10 MG: 10 TABLET, FILM COATED ORAL at 20:33

## 2023-06-13 RX ADMIN — GLYCOPYRROLATE 1 MG: 1 TABLET ORAL at 08:59

## 2023-06-13 RX ADMIN — GLYCOPYRROLATE 1 MG: 1 TABLET ORAL at 20:33

## 2023-06-13 RX ADMIN — PIPERACILLIN SODIUM AND TAZOBACTAM SODIUM 3.38 G: 3; .375 INJECTION, SOLUTION INTRAVENOUS at 03:55

## 2023-06-13 RX ADMIN — TERAZOSIN 1 MG: 1 CAPSULE ORAL at 20:33

## 2023-06-13 RX ADMIN — ASPIRIN 81 MG: 81 TABLET, CHEWABLE ORAL at 08:59

## 2023-06-13 RX ADMIN — ATORVASTATIN CALCIUM 20 MG: 20 TABLET, FILM COATED ORAL at 20:36

## 2023-06-13 RX ADMIN — TRAZODONE HYDROCHLORIDE 50 MG: 50 TABLET ORAL at 20:33

## 2023-06-13 RX ADMIN — ENOXAPARIN SODIUM 40 MG: 100 INJECTION SUBCUTANEOUS at 17:25

## 2023-06-13 RX ADMIN — DOCUSATE SODIUM 50MG AND SENNOSIDES 8.6MG 2 TABLET: 8.6; 5 TABLET, FILM COATED ORAL at 08:59

## 2023-06-13 RX ADMIN — CEFTRIAXONE 2 G: 2 INJECTION, POWDER, FOR SOLUTION INTRAMUSCULAR; INTRAVENOUS at 12:59

## 2023-06-13 RX ADMIN — CARVEDILOL 3.12 MG: 3.12 TABLET, FILM COATED ORAL at 08:59

## 2023-06-13 RX ADMIN — GUAIFENESIN 200 MG: 200 SOLUTION ORAL at 20:33

## 2023-06-13 RX ADMIN — GUAIFENESIN 200 MG: 200 SOLUTION ORAL at 17:25

## 2023-06-13 RX ADMIN — LANSOPRAZOLE 30 MG: 15 TABLET, ORALLY DISINTEGRATING, DELAYED RELEASE ORAL at 06:12

## 2023-06-13 NOTE — PROGRESS NOTES
[facility] Clinical Pharmacy Services: Medication Reconciliation     Aquiles Bahena is a 67 y.o. male presenting to [facility] for Hypoxia [R09.02]  Aspiration pneumonia of left lower lobe, unspecified aspiration pneumonia type [J69.0]       He  has a past medical history of Anoxic brain damage, Anxiety, Arthritis, BPH (benign prostatic hyperplasia), Coronary artery disease, COVID-19, Dementia, Dysphagia, GERD (gastroesophageal reflux disease), Hyperlipidemia, Hypotension, and Smoking.       Allergies as of 06/11/2023 - Reviewed 06/11/2023   Allergen Reaction Noted    Bupropion Other (See Comments) 05/20/2022          Medication information was obtained from: nursing home records     Prior to Admission Medications       Prescriptions Last Dose Taking?    acetaminophen (TYLENOL) 500 MG tablet  Yes    Administer 1 tablet per G tube Every 4 (Four) Hours As Needed for Mild Pain.    Aspirin 81 MG capsule  Yes    Administer  per G tube Daily.    carvedilol (COREG) 3.125 MG tablet 6/11/2023 Yes    Administer 1 tablet per G tube 2 (Two) Times a Day With Meals. Takes 0.5 tablets BID    donepezil (ARICEPT) 10 MG tablet 6/10/2023 Yes    Administer 1 tablet per G tube Every Night.    glycopyrrolate (ROBINUL) 1 MG tablet  Yes    Administer 1 tablet per G tube 2 (Two) Times a Day.    ipratropium-albuterol (DUO-NEB) 0.5-2.5 mg/3 ml nebulizer  Yes    Take 3 mL by nebulization Every 4 (Four) Hours As Needed for Wheezing or Shortness of Air.    loperamide (IMODIUM) 2 MG capsule  Yes    Administer 1 capsule per G tube Every 6 (Six) Hours As Needed for Diarrhea.    omeprazole (priLOSEC) 40 MG capsule  Yes    Administer 1 capsule per G tube Daily.    polyvinyl alcohol (LIQUIFILM) 1.4 % ophthalmic solution  Yes    Administer 1 drop to the right eye Every 4 (Four) Hours As Needed for Dry Eyes.    simvastatin (ZOCOR) 40 MG tablet 6/10/2023 Yes    Administer 1 tablet per G tube Every Night.    tamsulosin (FLOMAX) 0.4 MG  capsule 24 hr capsule  Yes    1 capsule Daily.    traZODone (DESYREL) 50 MG tablet 6/10/2023 Yes    Administer 1 tablet per G tube Every Night.           Medication notes: patient is actually taking 1/2 of 3.125 carvedilol tablet BID.        This medication list is complete to the best of my knowledge as of 6/13/2023       Please call if questions.     Rafia Blanton, PharmD  Clinical Pharmacist

## 2023-06-13 NOTE — PROGRESS NOTES
"Nutrition Services    Patient Name:  Aquiles Bahena  YOB: 1955  MRN: [id number]  Admit Date:  6/11/2023    Assessment Date:  06/13/23    CLINICAL NUTRITION    Comments: Follow up for tube feeding  Sleeping. TF's at goal rate and tolerating     Current TF regimen: Nutren 1.5 @ 50 ml/hr, tolerating, K+ 3.4, Glu 140, + BM 6/11    Plan/Recommendations: Continue tube feeds at goal rate. Monitor residuals. Replace electrolytes per protocol. Keep HOB elevated past 30 degrees     RD to continue to monitor per protocol.     Encounter Information         Reason For Encounter Follow up for TF    Current Issues Aspiration pneumonia      Estimated Requirements         Calories  5476-8797 (25 kcal/kg, 30 kcal/kg)    Protein (gm)  74-89 (1.0 - 1.2 gm/kg)    Fluid (mL)  7926-5462 (1 mL/kcal)     Current Nutrition Orders & Evaluation of Intake       Oral Nutrition     Current PO Diet NPO Diet NPO Type: Strict NPO   Supplement n/a   PO Evaluation     Trending % PO Intake     Factors Affecting Intake  chewing difficulty, swallow impairment      Enteral Nutrition     Enteral Route PEG    TF Delivery Method Continuous    Enteral Product Nutren 1.5    Modular None    Propofol Rate/Kcal     TF Current Rate (mL) 50    TF Goal Rate (mL) 50    Current Water Flush (mL) 30 ml q4 hours     Current TF Provision  1800 kcal, 82 g protein, 917 ml fluid + 180 ml FW          Calories 98 % needs met         Protein  100 % needs met         TF Fluid (mL) 1097         IV Fluids     Avg Volume Delivered (mL) 650 ml / 1 day    % Goal Volume     TF Residual  no or minimal residual    TF Changes none    TF Tolerance tolerating     Anthropometrics          Height    Weight Height: 172 cm (67.72\")  Weight: 74.3 kg (163 lb 12.8 oz) (06/13/23 0500)    BMI kg/m2 Body mass index is 25.11 kg/m².  Overweight (25 - 29.9)    Weight trend Loss, Amount/Timeframe:  9# (5%) x 1 month     Labs        Pertinent Labs Reviewed, listed below     Results from last 7 " days   Lab Units 06/13/23 0328 06/12/23  0141 06/11/23  1226   SODIUM mmol/L 143 138 137   POTASSIUM mmol/L 3.4* 4.1 4.9   CHLORIDE mmol/L 110* 107 106   CO2 mmol/L 26.0 18.8* 22.1   BUN mg/dL 22 22 20   CREATININE mg/dL 0.68* 0.83 0.80   CALCIUM mg/dL 8.6 9.1 9.4   BILIRUBIN mg/dL  --   --  0.8   ALK PHOS U/L  --   --  143*   ALT (SGPT) U/L  --   --  32   AST (SGOT) U/L  --   --  30   GLUCOSE mg/dL 137* 196* 187*     Results from last 7 days   Lab Units 06/13/23 0328 06/12/23  0141 06/11/23  1226   HEMOGLOBIN g/dL 12.3*   < > 14.9   HEMATOCRIT % 38.0   < > 45.8   WBC 10*3/mm3 10.72   < > 12.40*   ALBUMIN g/dL  --   --  3.8    < > = values in this interval not displayed.     Results from last 7 days   Lab Units 06/13/23 0328 06/12/23  0141 06/11/23  1226   PLATELETS 10*3/mm3 164 231 239     COVID19   Date Value Ref Range Status   06/11/2023 Not Detected Not Detected - Ref. Range Final     No results found for: HGBA1C       Medications            Scheduled Medications aspirin, 81 mg, Per G Tube, Daily  atorvastatin, 20 mg, Per G Tube, Nightly  cefTRIAXone, 2 g, Intravenous, Q24H  donepezil, 10 mg, Per G Tube, Nightly  enoxaparin, 40 mg, Subcutaneous, Q24H  glycopyrrolate, 1 mg, Per G Tube, BID  guaifenesin, 200 mg, Per G Tube, TID  lansoprazole, 30 mg, Per G Tube, Q AM  senna-docusate sodium, 2 tablet, Per G Tube, BID  terazosin, 1 mg, Per G Tube, Nightly  traZODone, 50 mg, Per G Tube, Nightly        Infusions sodium chloride, 75 mL/hr, Last Rate: 75 mL/hr (06/12/23 2040)        PRN Medications   acetaminophen    senna-docusate sodium **AND** polyethylene glycol **AND** bisacodyl **AND** bisacodyl    Glycerin-Hypromellose-    ipratropium-albuterol    melatonin    ondansetron **OR** ondansetron     Physical Findings          Physical Appearance nonverbal, overweight, somnolent   Oral/Mouth Cavity tooth or teeth missing   Edema  no edema   Gastrointestinal last bowel movement:6/11   Skin  skin intact    Tubes/Drains PEG   NFPE Not indicated at this time     NUTRITION INTERVENTION / PLAN OF CARE  Intervention Goal         Intervention Goal(s) Reduce/improve symptoms, Meet estimated needs, Disease management/therapy, Tolerate TF/PN at goal, and Maintain weight     Nutrition Intervention         RD Action Follow Tx Progress and Care plan reviewed     Prescription         Diet Prescription     Supplement Prescription    EN/PN Prescription    New Prescription Ordered? Continue same per protocol   --  Monitor/Evaluation        Monitor Per protocol, I&O, Pertinent labs, EN delivery/tolerance, Weight, Skin status, GI status, Symptoms, POC/GOC   Discharge Needs No discharge needs identified at this time   Education Education not appropriate at this time       Electronically signed by:  Dolores Briggs, KEL  06/13/23 16:29 EDT

## 2023-06-13 NOTE — PROGRESS NOTES
"[facility] Clinical Pharmacy Services: Enoxaparin Consult    Aquiles Bahena has a pharmacy consult to dose prophylactic enoxaparin per Herrera's request.     Indication: VTE Prophylaxis  Home Anticoagulation: none     Relevant clinical data and objective history reviewed:  67 y.o. male 172 cm (67.72\") 74.3 kg (163 lb 12.8 oz)   Body mass index is 25.11 kg/m².   Results from last 7 days   Lab Units 06/13/23  0328   PLATELETS 10*3/mm3 164     Estimated Creatinine Clearance: 110.8 mL/min (A) (by C-G formula based on SCr of 0.68 mg/dL (L)).    Assessment/Plan    Will start patient on 40mg subcutaneous every 24 hours, adjusted for renal function. Consult order will be discontinued but pharmacy will continue to follow.     Rafia Blanton, PharmD  Clinical Pharmacist    "

## 2023-06-13 NOTE — PROGRESS NOTES
Name: Aquiles Bahena ADMIT: 2023   : 1955  PCP: Lali Fink MD    MRN: [id number] LOS: 2 days   AGE/SEX: 67 y.o. male  ROOM: [facility]     Subjective   Subjective     No events overnight. No new complaints. Down to 5L when I was in the room with him       Objective   Objective   Vital Signs  Temp:  [97.5 °F (36.4 °C)-99 °F (37.2 °C)] 99 °F (37.2 °C)  Heart Rate:  [75-82] 82  Resp:  [18-20] 18  BP: ()/(55-73) 105/67  SpO2:  [93 %-96 %] 93 %  on  Flow (L/min):  [6-7] 7;   Device (Oxygen Therapy): nasal cannula  Body mass index is 25.11 kg/m².  Physical Exam  Constitutional:       General: He is not in acute distress.     Appearance: He is ill-appearing (chronically). He is not toxic-appearing.   Cardiovascular:      Rate and Rhythm: Normal rate and regular rhythm.      Heart sounds: Normal heart sounds.   Pulmonary:      Effort: Pulmonary effort is normal.      Breath sounds: Normal breath sounds.   Abdominal:      General: Bowel sounds are normal.      Palpations: Abdomen is soft.      Comments: G tube   Musculoskeletal:         General: No tenderness.      Right lower leg: No edema.      Left lower leg: No edema.   Neurological:      Mental Status: He is alert.   Psychiatric:         Mood and Affect: Mood normal.         Behavior: Behavior normal.       Results Review     I reviewed the patient's new clinical results.  Results from last 7 days   Lab Units 23  0328 23  0141 23  1226   WBC 10*3/mm3 10.72 14.26* 12.40*   HEMOGLOBIN g/dL 12.3* 15.0 14.9   PLATELETS 10*3/mm3 164 231 239     Results from last 7 days   Lab Units 23  0328 23  0141 23  1226   SODIUM mmol/L 143 138 137   POTASSIUM mmol/L 3.4* 4.1 4.9   CHLORIDE mmol/L 110* 107 106   CO2 mmol/L 26.0 18.8* 22.1   BUN mg/dL 22 22 20   CREATININE mg/dL 0.68* 0.83 0.80   GLUCOSE mg/dL 137* 196* 187*   Estimated Creatinine Clearance: 110.8 mL/min (A) (by C-G formula based on SCr of 0.68 mg/dL  (L)).  Results from last 7 days   Lab Units 06/11/23  1226   ALBUMIN g/dL 3.8   BILIRUBIN mg/dL 0.8   ALK PHOS U/L 143*   AST (SGOT) U/L 30   ALT (SGPT) U/L 32     Results from last 7 days   Lab Units 06/13/23  0328 06/12/23  0141 06/11/23  1226   CALCIUM mg/dL 8.6 9.1 9.4   ALBUMIN g/dL  --   --  3.8     Results from last 7 days   Lab Units 06/12/23  1028 06/12/23  0431 06/12/23  0141 06/11/23  2247 06/11/23  1228 06/11/23  1226   PROCALCITONIN ng/mL  --   --   --   --   --  0.06   LACTATE mmol/L 2.0 2.5* 3.3* 2.3*   < >  --     < > = values in this interval not displayed.     COVID19   Date Value Ref Range Status   06/11/2023 Not Detected Not Detected - Ref. Range Final   05/17/2023 Not Detected Not Detected - Ref. Range Final   05/15/2023 Not Detected Not Detected - Ref. Range Final     Glucose   Date/Time Value Ref Range Status   06/13/2023 1110 172 (H) 70 - 130 mg/dL Final     Comment:     Meter: [id number] : [id number] Tamika [facility]SU   06/13/2023 0634 148 (H) 70 - 130 mg/dL Final     Comment:     Meter: [id number] : [id number] [facility] Annette SHADI   06/12/2023 2059 134 (H) 70 - 130 mg/dL Final     Comment:     Meter: [id number] : [id number] [facility] Annette NA   06/12/2023 1639 106 70 - 130 mg/dL Final     Comment:     Meter: [id number] : [id number] Tam[facility]   06/12/2023 1102 149 (H) 70 - 130 mg/dL Final     Comment:     Meter: [id number] : [id number] Tamika Hoffmaneb SHADI       XR Chest 1 View  XR CHEST 1 VW-     CONCLUSION: Shortness of breath     FINDINGS: Mild elevation of the right hemidiaphragm similar to the  previous examination. Cardiac size within normal limits. No pleural  effusion or pulmonary edema. The right lung is clear. There is a  wedge-shaped area of opacity at the left base superimposing the cardiac  silhouette, suspect lower lobe infiltrates. The left lung is otherwise  clear. The remainder is unremarkable.     This report was finalized on 6/11/2023 1:04 PM by  Dr. Sudheer Neal M.D.       Scheduled Medications  aspirin, 81 mg, Per G Tube, Daily  atorvastatin, 20 mg, Per G Tube, Nightly  cefTRIAXone, 2 g, Intravenous, Q24H  donepezil, 10 mg, Per G Tube, Nightly  glycopyrrolate, 1 mg, Per G Tube, BID  guaifenesin, 200 mg, Per G Tube, TID  lansoprazole, 30 mg, Per G Tube, Q AM  senna-docusate sodium, 2 tablet, Per G Tube, BID  terazosin, 1 mg, Per G Tube, Nightly  traZODone, 50 mg, Per G Tube, Nightly    Infusions  sodium chloride, 75 mL/hr, Last Rate: 75 mL/hr (06/12/23 2040)    Diet  NPO Diet NPO Type: Strict NPO       Assessment/Plan     Active Hospital Problems    Diagnosis  POA    **Aspiration pneumonia of left lower lobe, unspecified aspiration pneumonia type [J69.0]  Yes    Hypoxia [R09.02]  Yes    Anoxic brain injury [G93.1]  Yes    Dysphagia [R13.10]  Yes    GERD (gastroesophageal reflux disease) [K21.9]  Yes    CAD (coronary artery disease) [I25.10]  Yes    HTN (hypertension) [I10]  Yes      Resolved Hospital Problems   No resolved problems to display.       67 y.o. male admitted with Aspiration pneumonia of left lower lobe, unspecified aspiration pneumonia type.    Pneumonia causing acute respiratory failure with hypoxia-narrow abx to ceftriaxone 2 grams daily. Will plan a 7 day course of abx. Wean O2 as able  1/2 blood cultures with staph species-not aureus or lugdunensis, and so likely a contaminant. Follow final culture results  Essential hypertension-blood pressure has been soft. Hold coreg  Coronary artery disease-asa/statin  GERD-ppi  Chronic dysphagia-g tube  History of anoxic brain injury  Pharmacy to dose Lovenox for DVT prophylaxis.  Limited code (no CPR, no intubation).  Discussed with patient and nursing staff.  Anticipate discharge to SNU facility timing yet to be determined.      Kristian Herrera MD  [facility]  06/13/23  14:56 EDT    I wore protective equipment throughout this patient encounter including a face mask, gloves and  protective eyewear.  Hand hygiene was performed before donning protective equipment and after removal when leaving the room.

## 2023-06-14 LAB
ANION GAP SERPL CALCULATED.3IONS-SCNC: 7 MMOL/L (ref 5–15)
BACTERIA SPEC AEROBE CULT: ABNORMAL
BASOPHILS # BLD AUTO: 0.02 10*3/MM3 (ref 0–0.2)
BASOPHILS NFR BLD AUTO: 0.3 % (ref 0–1.5)
BUN SERPL-MCNC: 15 MG/DL (ref 8–23)
BUN/CREAT SERPL: 28.8 (ref 7–25)
CALCIUM SPEC-SCNC: 7.6 MG/DL (ref 8.6–10.5)
CHLORIDE SERPL-SCNC: 113 MMOL/L (ref 98–107)
CO2 SERPL-SCNC: 25 MMOL/L (ref 22–29)
CREAT SERPL-MCNC: 0.52 MG/DL (ref 0.76–1.27)
DEPRECATED RDW RBC AUTO: 39.8 FL (ref 37–54)
EGFRCR SERPLBLD CKD-EPI 2021: 110.5 ML/MIN/1.73
EOSINOPHIL # BLD AUTO: 0.15 10*3/MM3 (ref 0–0.4)
EOSINOPHIL NFR BLD AUTO: 1.9 % (ref 0.3–6.2)
ERYTHROCYTE [DISTWIDTH] IN BLOOD BY AUTOMATED COUNT: 12.6 % (ref 12.3–15.4)
GLUCOSE BLDC GLUCOMTR-MCNC: 115 MG/DL (ref 70–130)
GLUCOSE BLDC GLUCOMTR-MCNC: 140 MG/DL (ref 70–130)
GLUCOSE BLDC GLUCOMTR-MCNC: 147 MG/DL (ref 70–130)
GLUCOSE BLDC GLUCOMTR-MCNC: 151 MG/DL (ref 70–130)
GLUCOSE BLDC GLUCOMTR-MCNC: 161 MG/DL (ref 70–130)
GLUCOSE SERPL-MCNC: 144 MG/DL (ref 65–99)
GRAM STN SPEC: ABNORMAL
HCT VFR BLD AUTO: 33.9 % (ref 37.5–51)
HGB BLD-MCNC: 11.1 G/DL (ref 13–17.7)
IMM GRANULOCYTES # BLD AUTO: 0.04 10*3/MM3 (ref 0–0.05)
IMM GRANULOCYTES NFR BLD AUTO: 0.5 % (ref 0–0.5)
ISOLATED FROM: ABNORMAL
LYMPHOCYTES # BLD AUTO: 0.8 10*3/MM3 (ref 0.7–3.1)
LYMPHOCYTES NFR BLD AUTO: 10.1 % (ref 19.6–45.3)
MCH RBC QN AUTO: 28.6 PG (ref 26.6–33)
MCHC RBC AUTO-ENTMCNC: 32.7 G/DL (ref 31.5–35.7)
MCV RBC AUTO: 87.4 FL (ref 79–97)
MONOCYTES # BLD AUTO: 0.51 10*3/MM3 (ref 0.1–0.9)
MONOCYTES NFR BLD AUTO: 6.4 % (ref 5–12)
NEUTROPHILS NFR BLD AUTO: 6.4 10*3/MM3 (ref 1.7–7)
NEUTROPHILS NFR BLD AUTO: 80.8 % (ref 42.7–76)
NRBC BLD AUTO-RTO: 0 /100 WBC (ref 0–0.2)
PLATELET # BLD AUTO: 154 10*3/MM3 (ref 140–450)
PMV BLD AUTO: 11.5 FL (ref 6–12)
POTASSIUM SERPL-SCNC: 3.4 MMOL/L (ref 3.5–5.2)
RBC # BLD AUTO: 3.88 10*6/MM3 (ref 4.14–5.8)
SODIUM SERPL-SCNC: 145 MMOL/L (ref 136–145)
WBC NRBC COR # BLD: 7.92 10*3/MM3 (ref 3.4–10.8)

## 2023-06-14 PROCEDURE — 25010000002 CEFTRIAXONE PER 250 MG: Performed by: STUDENT IN AN ORGANIZED HEALTH CARE EDUCATION/TRAINING PROGRAM

## 2023-06-14 PROCEDURE — 25010000002 ENOXAPARIN PER 10 MG: Performed by: STUDENT IN AN ORGANIZED HEALTH CARE EDUCATION/TRAINING PROGRAM

## 2023-06-14 PROCEDURE — 82948 REAGENT STRIP/BLOOD GLUCOSE: CPT

## 2023-06-14 PROCEDURE — 85025 COMPLETE CBC W/AUTO DIFF WBC: CPT | Performed by: NURSE PRACTITIONER

## 2023-06-14 PROCEDURE — 80048 BASIC METABOLIC PNL TOTAL CA: CPT | Performed by: NURSE PRACTITIONER

## 2023-06-14 PROCEDURE — 94799 UNLISTED PULMONARY SVC/PX: CPT

## 2023-06-14 RX ORDER — POTASSIUM CHLORIDE 1.5 G/1.77G
40 POWDER, FOR SOLUTION ORAL ONCE
Status: COMPLETED | OUTPATIENT
Start: 2023-06-14 | End: 2023-06-14

## 2023-06-14 RX ORDER — BISACODYL 10 MG
10 SUPPOSITORY, RECTAL RECTAL DAILY PRN
Status: DISCONTINUED | OUTPATIENT
Start: 2023-06-14 | End: 2023-06-15 | Stop reason: HOSPADM

## 2023-06-14 RX ORDER — POLYETHYLENE GLYCOL 3350 17 G/17G
17 POWDER, FOR SOLUTION ORAL DAILY PRN
Status: DISCONTINUED | OUTPATIENT
Start: 2023-06-14 | End: 2023-06-15 | Stop reason: HOSPADM

## 2023-06-14 RX ORDER — AMOXICILLIN 250 MG
2 CAPSULE ORAL 2 TIMES DAILY
Status: DISCONTINUED | OUTPATIENT
Start: 2023-06-14 | End: 2023-06-15 | Stop reason: HOSPADM

## 2023-06-14 RX ORDER — POTASSIUM CHLORIDE 750 MG/1
40 TABLET, FILM COATED, EXTENDED RELEASE ORAL ONCE
Status: DISCONTINUED | OUTPATIENT
Start: 2023-06-14 | End: 2023-06-14

## 2023-06-14 RX ORDER — BISACODYL 5 MG/1
5 TABLET, DELAYED RELEASE ORAL DAILY PRN
Status: DISCONTINUED | OUTPATIENT
Start: 2023-06-14 | End: 2023-06-15 | Stop reason: HOSPADM

## 2023-06-14 RX ORDER — POLYETHYLENE GLYCOL 3350 17 G/17G
17 POWDER, FOR SOLUTION ORAL DAILY
Status: DISCONTINUED | OUTPATIENT
Start: 2023-06-14 | End: 2023-06-14

## 2023-06-14 RX ADMIN — GUAIFENESIN 200 MG: 200 SOLUTION ORAL at 18:31

## 2023-06-14 RX ADMIN — TRAZODONE HYDROCHLORIDE 50 MG: 50 TABLET ORAL at 20:40

## 2023-06-14 RX ADMIN — TERAZOSIN 1 MG: 1 CAPSULE ORAL at 20:40

## 2023-06-14 RX ADMIN — DOCUSATE SODIUM 50MG AND SENNOSIDES 8.6MG 2 TABLET: 8.6; 5 TABLET, FILM COATED ORAL at 20:40

## 2023-06-14 RX ADMIN — ENOXAPARIN SODIUM 40 MG: 100 INJECTION SUBCUTANEOUS at 18:31

## 2023-06-14 RX ADMIN — ASPIRIN 81 MG: 81 TABLET, CHEWABLE ORAL at 09:43

## 2023-06-14 RX ADMIN — CEFTRIAXONE 2 G: 2 INJECTION, POWDER, FOR SOLUTION INTRAMUSCULAR; INTRAVENOUS at 15:01

## 2023-06-14 RX ADMIN — GLYCOPYRROLATE 1 MG: 1 TABLET ORAL at 09:43

## 2023-06-14 RX ADMIN — POTASSIUM CHLORIDE 40 MEQ: 1.5 POWDER, FOR SOLUTION ORAL at 15:01

## 2023-06-14 RX ADMIN — GUAIFENESIN 200 MG: 200 SOLUTION ORAL at 09:43

## 2023-06-14 RX ADMIN — GLYCOPYRROLATE 1 MG: 1 TABLET ORAL at 20:40

## 2023-06-14 RX ADMIN — DONEPEZIL HYDROCHLORIDE 10 MG: 10 TABLET, FILM COATED ORAL at 20:40

## 2023-06-14 RX ADMIN — LANSOPRAZOLE 30 MG: 15 TABLET, ORALLY DISINTEGRATING, DELAYED RELEASE ORAL at 06:14

## 2023-06-14 RX ADMIN — ATORVASTATIN CALCIUM 20 MG: 20 TABLET, FILM COATED ORAL at 20:41

## 2023-06-14 NOTE — PROGRESS NOTES
Name: Aquiles Bahena ADMIT: 2023   : 1955  PCP: Lali Fink MD    MRN: [id number] LOS: 3 days   AGE/SEX: 67 y.o. male  ROOM: [facility]     Subjective   Subjective     No events overnight.  No new complaints.  His oxygen requirement is down significantly to 1 L, which is his home requirement.       Objective   Objective   Vital Signs  Temp:  [97.4 °F (36.3 °C)-99 °F (37.2 °C)] 97.4 °F (36.3 °C)  Heart Rate:  [78-85] 78  Resp:  [18-20] 20  BP: (105-118)/(61-71) 118/64  SpO2:  [90 %-94 %] 92 %  on  Flow (L/min):  [1-7] 1;   Device (Oxygen Therapy): nasal cannula  Body mass index is 25.93 kg/m².  Physical Exam  Constitutional:       General: He is not in acute distress.     Appearance: He is ill-appearing (chronically). He is not toxic-appearing.   Cardiovascular:      Rate and Rhythm: Normal rate and regular rhythm.      Heart sounds: Normal heart sounds.   Pulmonary:      Effort: Pulmonary effort is normal.      Breath sounds: Normal breath sounds.   Abdominal:      General: Bowel sounds are normal.      Palpations: Abdomen is soft.      Comments: G tube   Musculoskeletal:         General: No tenderness.      Right lower leg: No edema.      Left lower leg: No edema.   Neurological:      Mental Status: He is alert.   Psychiatric:         Mood and Affect: Mood normal.         Behavior: Behavior normal.       Results Review     I reviewed the patient's new clinical results.  Results from last 7 days   Lab Units 23  1226   WBC 10*3/mm3 7.92 10.72 14.26* 12.40*   HEMOGLOBIN g/dL 11.1* 12.3* 15.0 14.9   PLATELETS 10*3/mm3 154 164 231 239       Results from last 7 days   Lab Units 23  0435 23  0328 23  0141 23  1226   SODIUM mmol/L 145 143 138 137   POTASSIUM mmol/L 3.4* 3.4* 4.1 4.9   CHLORIDE mmol/L 113* 110* 107 106   CO2 mmol/L 25.0 26.0 18.8* 22.1   BUN mg/dL 15 22 22 20   CREATININE mg/dL 0.52* 0.68* 0.83 0.80   GLUCOSE mg/dL  144* 137* 196* 187*     Estimated Creatinine Clearance: 149.5 mL/min (A) (by C-G formula based on SCr of 0.52 mg/dL (L)).  Results from last 7 days   Lab Units 06/11/23  1226   ALBUMIN g/dL 3.8   BILIRUBIN mg/dL 0.8   ALK PHOS U/L 143*   AST (SGOT) U/L 30   ALT (SGPT) U/L 32       Results from last 7 days   Lab Units 06/14/23  0435 06/13/23  0328 06/12/23  0141 06/11/23  1226   CALCIUM mg/dL 7.6* 8.6 9.1 9.4   ALBUMIN g/dL  --   --   --  3.8       Results from last 7 days   Lab Units 06/12/23  1028 06/12/23  0431 06/12/23  0141 06/11/23  2247 06/11/23  1228 06/11/23  1226   PROCALCITONIN ng/mL  --   --   --   --   --  0.06   LACTATE mmol/L 2.0 2.5* 3.3* 2.3*   < >  --     < > = values in this interval not displayed.       COVID19   Date Value Ref Range Status   06/11/2023 Not Detected Not Detected - Ref. Range Final   05/17/2023 Not Detected Not Detected - Ref. Range Final   05/15/2023 Not Detected Not Detected - Ref. Range Final     Glucose   Date/Time Value Ref Range Status   06/14/2023 1118 161 (H) 70 - 130 mg/dL Final     Comment:     Meter: [id number] : [id number] Johan Patino CNA   06/14/2023 0631 140 (H) 70 - 130 mg/dL Final     Comment:     Meter: [id number] : [id number] [facility]   06/14/2023 0005 151 (H) 70 - 130 mg/dL Final     Comment:     Meter: [id number] : [id number] [facility]   06/13/2023 2109 80 70 - 130 mg/dL Final     Comment:     Meter: [id number] : [id number] [facility]   06/13/2023 1615 140 (H) 70 - 130 mg/dL Final     Comment:     Meter: [id number] : [id number] [facility]   06/13/2023 1110 172 (H) 70 - 130 mg/dL Final     Comment:     Meter: [id number] : [id number] toñito [facility]   06/13/2023 0634 148 (H) 70 - 130 mg/dL Final     Comment:     Meter: [id number] : [id number] [facility]       XR Chest 1 View  XR CHEST 1 VW-     CONCLUSION: Shortness of breath     FINDINGS: Mild elevation of the right  hemidiaphragm similar to the  previous examination. Cardiac size within normal limits. No pleural  effusion or pulmonary edema. The right lung is clear. There is a  wedge-shaped area of opacity at the left base superimposing the cardiac  silhouette, suspect lower lobe infiltrates. The left lung is otherwise  clear. The remainder is unremarkable.     This report was finalized on 6/11/2023 1:04 PM by Dr. Sudheer Neal M.D.       Scheduled Medications  aspirin, 81 mg, Per G Tube, Daily  atorvastatin, 20 mg, Per G Tube, Nightly  cefTRIAXone, 2 g, Intravenous, Q24H  donepezil, 10 mg, Per G Tube, Nightly  enoxaparin, 40 mg, Subcutaneous, Q24H  glycopyrrolate, 1 mg, Per G Tube, BID  guaifenesin, 200 mg, Per G Tube, TID  lansoprazole, 30 mg, Per G Tube, Q AM  senna-docusate sodium, 2 tablet, Per G Tube, BID   And  polyethylene glycol, 17 g, Per G Tube, Daily  terazosin, 1 mg, Per G Tube, Nightly  traZODone, 50 mg, Per G Tube, Nightly    Infusions     Diet  NPO Diet NPO Type: Strict NPO       Assessment/Plan     Active Hospital Problems    Diagnosis  POA    **Aspiration pneumonia of left lower lobe, unspecified aspiration pneumonia type [J69.0]  Yes    Hypoxia [R09.02]  Yes    Anoxic brain injury [G93.1]  Yes    Dysphagia [R13.10]  Yes    GERD (gastroesophageal reflux disease) [K21.9]  Yes    CAD (coronary artery disease) [I25.10]  Yes    HTN (hypertension) [I10]  Yes      Resolved Hospital Problems   No resolved problems to display.       67 y.o. male admitted with Aspiration pneumonia of left lower lobe, unspecified aspiration pneumonia type.    Pneumonia causing acute on chronic respiratory failure with hypoxia-improving on ceftriaxone.  Hypokalemia-replace  1/2 blood cultures with coagulase-negative staph-this is a contaminant  Essential hypertension-blood pressure looks better today with Coreg held.  Coronary artery disease-asa/statin  GERD-ppi  Chronic dysphagia-g tube  History of anoxic brain injury  Pharmacy to  dose Lovenox for DVT prophylaxis.  Limited code (no CPR, no intubation).  Discussed with patient and nursing staff.  Anticipate discharge to SNU facility in 1-2 days.      Kristian Herrera MD  [facility]  06/14/23  12:44 EDT    I wore protective equipment throughout this patient encounter including a face mask, gloves and protective eyewear.  Hand hygiene was performed before donning protective equipment and after removal when leaving the room.

## 2023-06-15 VITALS
RESPIRATION RATE: 20 BRPM | DIASTOLIC BLOOD PRESSURE: 79 MMHG | BODY MASS INDEX: 23.82 KG/M2 | HEIGHT: 68 IN | OXYGEN SATURATION: 92 % | SYSTOLIC BLOOD PRESSURE: 123 MMHG | TEMPERATURE: 98.1 F | WEIGHT: 157.19 LBS | HEART RATE: 65 BPM

## 2023-06-15 PROBLEM — J69.0 ASPIRATION PNEUMONIA OF LEFT LOWER LOBE, UNSPECIFIED ASPIRATION PNEUMONIA TYPE: Status: RESOLVED | Noted: 2023-06-11 | Resolved: 2023-06-15

## 2023-06-15 PROBLEM — R09.02 HYPOXIA: Status: RESOLVED | Noted: 2023-06-12 | Resolved: 2023-06-15

## 2023-06-15 LAB
ANION GAP SERPL CALCULATED.3IONS-SCNC: 7 MMOL/L (ref 5–15)
BASOPHILS # BLD AUTO: 0.05 10*3/MM3 (ref 0–0.2)
BASOPHILS NFR BLD AUTO: 0.7 % (ref 0–1.5)
BUN SERPL-MCNC: 11 MG/DL (ref 8–23)
BUN/CREAT SERPL: 21.6 (ref 7–25)
CALCIUM SPEC-SCNC: 7.6 MG/DL (ref 8.6–10.5)
CHLORIDE SERPL-SCNC: 112 MMOL/L (ref 98–107)
CO2 SERPL-SCNC: 22 MMOL/L (ref 22–29)
CREAT SERPL-MCNC: 0.51 MG/DL (ref 0.76–1.27)
DEPRECATED RDW RBC AUTO: 41.9 FL (ref 37–54)
EGFRCR SERPLBLD CKD-EPI 2021: 111.1 ML/MIN/1.73
EOSINOPHIL # BLD AUTO: 0.33 10*3/MM3 (ref 0–0.4)
EOSINOPHIL NFR BLD AUTO: 4.7 % (ref 0.3–6.2)
ERYTHROCYTE [DISTWIDTH] IN BLOOD BY AUTOMATED COUNT: 13 % (ref 12.3–15.4)
GLUCOSE BLDC GLUCOMTR-MCNC: 119 MG/DL (ref 70–130)
GLUCOSE SERPL-MCNC: 119 MG/DL (ref 65–99)
HCT VFR BLD AUTO: 35.4 % (ref 37.5–51)
HGB BLD-MCNC: 11.5 G/DL (ref 13–17.7)
IMM GRANULOCYTES # BLD AUTO: 0.04 10*3/MM3 (ref 0–0.05)
IMM GRANULOCYTES NFR BLD AUTO: 0.6 % (ref 0–0.5)
LYMPHOCYTES # BLD AUTO: 1 10*3/MM3 (ref 0.7–3.1)
LYMPHOCYTES NFR BLD AUTO: 14.1 % (ref 19.6–45.3)
MAGNESIUM SERPL-MCNC: 1.7 MG/DL (ref 1.6–2.4)
MCH RBC QN AUTO: 28.8 PG (ref 26.6–33)
MCHC RBC AUTO-ENTMCNC: 32.5 G/DL (ref 31.5–35.7)
MCV RBC AUTO: 88.7 FL (ref 79–97)
MONOCYTES # BLD AUTO: 0.64 10*3/MM3 (ref 0.1–0.9)
MONOCYTES NFR BLD AUTO: 9.1 % (ref 5–12)
NEUTROPHILS NFR BLD AUTO: 5.01 10*3/MM3 (ref 1.7–7)
NEUTROPHILS NFR BLD AUTO: 70.8 % (ref 42.7–76)
NRBC BLD AUTO-RTO: 0 /100 WBC (ref 0–0.2)
PLATELET # BLD AUTO: 168 10*3/MM3 (ref 140–450)
PMV BLD AUTO: 10.8 FL (ref 6–12)
POTASSIUM SERPL-SCNC: 3.9 MMOL/L (ref 3.5–5.2)
RBC # BLD AUTO: 3.99 10*6/MM3 (ref 4.14–5.8)
SARS-COV-2 RNA RESP QL NAA+PROBE: NOT DETECTED
SODIUM SERPL-SCNC: 141 MMOL/L (ref 136–145)
WBC NRBC COR # BLD: 7.07 10*3/MM3 (ref 3.4–10.8)

## 2023-06-15 PROCEDURE — 80048 BASIC METABOLIC PNL TOTAL CA: CPT | Performed by: NURSE PRACTITIONER

## 2023-06-15 PROCEDURE — 25010000002 CEFTRIAXONE PER 250 MG: Performed by: STUDENT IN AN ORGANIZED HEALTH CARE EDUCATION/TRAINING PROGRAM

## 2023-06-15 PROCEDURE — 83735 ASSAY OF MAGNESIUM: CPT | Performed by: STUDENT IN AN ORGANIZED HEALTH CARE EDUCATION/TRAINING PROGRAM

## 2023-06-15 PROCEDURE — 82948 REAGENT STRIP/BLOOD GLUCOSE: CPT

## 2023-06-15 PROCEDURE — 85025 COMPLETE CBC W/AUTO DIFF WBC: CPT | Performed by: NURSE PRACTITIONER

## 2023-06-15 PROCEDURE — 87635 SARS-COV-2 COVID-19 AMP PRB: CPT | Performed by: STUDENT IN AN ORGANIZED HEALTH CARE EDUCATION/TRAINING PROGRAM

## 2023-06-15 RX ORDER — CEFDINIR 300 MG/1
300 CAPSULE ORAL 2 TIMES DAILY
Qty: 2 CAPSULE | Refills: 0
Start: 2023-06-15 | End: 2023-06-16

## 2023-06-15 RX ADMIN — DOCUSATE SODIUM 50MG AND SENNOSIDES 8.6MG 2 TABLET: 8.6; 5 TABLET, FILM COATED ORAL at 09:16

## 2023-06-15 RX ADMIN — ASPIRIN 81 MG: 81 TABLET, CHEWABLE ORAL at 09:16

## 2023-06-15 RX ADMIN — GLYCOPYRROLATE 1 MG: 1 TABLET ORAL at 09:16

## 2023-06-15 RX ADMIN — GUAIFENESIN 200 MG: 200 SOLUTION ORAL at 09:17

## 2023-06-15 RX ADMIN — LANSOPRAZOLE 30 MG: 15 TABLET, ORALLY DISINTEGRATING, DELAYED RELEASE ORAL at 05:51

## 2023-06-15 RX ADMIN — CEFTRIAXONE 2 G: 2 INJECTION, POWDER, FOR SOLUTION INTRAMUSCULAR; INTRAVENOUS at 13:42

## 2023-06-15 NOTE — CASE MANAGEMENT/SOCIAL WORK
Case Management Discharge Note      Final Note: [facility] via stretcher van at 4pm, no additional CCP needs. Gallito RN/CCP         Selected Continued Care - Admitted Since 6/11/2023       Destination Coordination complete.      Service Provider Selected Services Address Phone Fax Patient Preferred    [address] [address] [phone] --              Durable Medical Equipment    No services have been selected for the patient.                Dialysis/Infusion    No services have been selected for the patient.                Home Medical Care    No services have been selected for the patient.                Therapy    No services have been selected for the patient.                Community Resources    No services have been selected for the patient.                Community & DME    No services have been selected for the patient.                    Selected Continued Care - Prior Encounters Includes continued care and service providers with selected services from prior encounters from 3/13/2023 to 6/15/2023      Discharged on 5/17/2023 Admission date: 5/16/2023 - Discharge disposition: Skilled Nursing Facility (DC - External)      Destination       Service Provider Selected Services Address Phone Fax Patient Preferred    [address] [address] [phone] --                               Final Discharge Disposition Code: 04 - intermediate care facility

## 2023-06-15 NOTE — CASE MANAGEMENT/SOCIAL WORK
Continued Stay Note  [facility]     Patient Name: Aquiles Bahena  MRN: [id number]  Today's Date: 6/15/2023    Admit Date: 6/11/2023    Plan: Psychiatric via stretcher van at 4pm   Discharge Plan       Row Name 06/15/23 1359       Plan    Plan Psychiatric via stretcher van at 4pm    Patient/Family in Agreement with Plan yes    Plan Comments DC orders noted. [facility] Stretcher van transport arranged for 4pm, confirmation #[id number] and cost $190. MD, RN, Valentino, pts son, and [facility] notified of transport time. Partial packet given to RN. Gallito RN/CCP    Final Discharge Disposition Code 04 - intermediate care facility    Final Note Psychiatric via stretcher van at 4pm, no additional CCP needs. Gallito RN/CCP                   Discharge Codes    No documentation.                 Expected Discharge Date and Time       Expected Discharge Date Expected Discharge Time    Noe 15, 2023               Nidhi Echavarria RN

## 2023-06-15 NOTE — DISCHARGE SUMMARY
Patient Name: Aquiles Bahena  : 1955  MRN: [id number]    Date of Admission: 2023  Date of Discharge:  6/15/2023  Primary Care Physician: Lali Fink MD      Chief Complaint:   Shortness of Breath and Aspiration      Discharge Diagnoses     Active Hospital Problems    Diagnosis  POA   • Anoxic brain injury [G93.1]  Yes   • Dysphagia [R13.10]  Yes   • GERD (gastroesophageal reflux disease) [K21.9]  Yes   • CAD (coronary artery disease) [I25.10]  Yes   • HTN (hypertension) [I10]  Yes      Resolved Hospital Problems    Diagnosis Date Resolved POA   • Hypoxia [R09.02] 06/15/2023 Yes   • Aspiration pneumonia of left lower lobe, unspecified aspiration pneumonia type [J69.0] 06/15/2023 Yes        Hospital Course     Mr. Bahena is a 67 y.o. male with a history of hypertension, coronary artery disease, GERD, history of an anoxic brain injury with chronic aphasia and dysphagia status post G-tube placement previously who presented to [facility] initially from his nursing facility where he was found to be short of breath with cough and low oxygen saturations after being given food by mouth.  Please see the admitting history and physical for further details.  He was found to have aspiration pneumonia causing acute on chronic hypoxic respiratory failure and was admitted to the hospital for further evaluation and treatment.      He was started on IV antibiotics, with gradual improvement in his respiratory failure and leukocytosis.  He typically wears 1 L at home, but today he is actually off oxygen entirely.  He will be discharged back to his nursing facility this afternoon with some oral cefdinir to complete a 5-day course of therapy.    Day of Discharge     Subjective:  No events overnight.  He is actually on room air when I saw him this morning.    Physical Exam:  Temp:  [97.2 °F (36.2 °C)-98.9 °F (37.2 °C)] 98.1 °F (36.7 °C)  Heart Rate:  [65-76] 65  Resp:  [20] 20  BP: (104-127)/(66-79)  123/79  Body mass index is 24.1 kg/m².  Physical Exam  Constitutional:       General: He is not in acute distress.     Appearance: He is ill-appearing (Chronically). He is not toxic-appearing.   Cardiovascular:      Rate and Rhythm: Normal rate and regular rhythm.      Heart sounds: Normal heart sounds.   Pulmonary:      Effort: Pulmonary effort is normal.      Breath sounds: Normal breath sounds.   Abdominal:      General: Bowel sounds are normal.      Palpations: Abdomen is soft.      Comments: G-tube   Musculoskeletal:         General: No tenderness.      Right lower leg: No edema.      Left lower leg: No edema.   Neurological:      Mental Status: He is alert.   Psychiatric:         Mood and Affect: Mood normal.         Behavior: Behavior normal.         Consultants     Consult Orders (all) (From admission, onward)     Start     Ordered    06/11/23 1936  Inpatient Nutrition Consult  Once        Provider:  (Not yet assigned)    06/11/23 1936 06/11/23 1319  [facility] (on-call MD unless specified) Details  Once        Specialty:  Hospitalist  Provider:  Laura Sanchez MD    06/11/23 1318              Procedures     Imaging Results (All)     Procedure Component Value Units Date/Time    XR Chest 1 View [[id number]] Collected: 06/11/23 1303     Updated: 06/11/23 1307    Narrative:      XR CHEST 1 VW-     CONCLUSION: Shortness of breath     FINDINGS: Mild elevation of the right hemidiaphragm similar to the  previous examination. Cardiac size within normal limits. No pleural  effusion or pulmonary edema. The right lung is clear. There is a  wedge-shaped area of opacity at the left base superimposing the cardiac  silhouette, suspect lower lobe infiltrates. The left lung is otherwise  clear. The remainder is unremarkable.     This report was finalized on 6/11/2023 1:04 PM by Dr. Sudheer Neal M.D.             Pertinent Labs     Results from last 7 days   Lab Units 06/15/23  0809 06/14/23  0435 06/13/23  0320  06/12/23  0141   WBC 10*3/mm3 7.07 7.92 10.72 14.26*   HEMOGLOBIN g/dL 11.5* 11.1* 12.3* 15.0   PLATELETS 10*3/mm3 168 154 164 231     Results from last 7 days   Lab Units 06/15/23  0809 06/14/23  0435 06/13/23 0328 06/12/23  0141   SODIUM mmol/L 141 145 143 138   POTASSIUM mmol/L 3.9 3.4* 3.4* 4.1   CHLORIDE mmol/L 112* 113* 110* 107   CO2 mmol/L 22.0 25.0 26.0 18.8*   BUN mg/dL 11 15 22 22   CREATININE mg/dL 0.51* 0.52* 0.68* 0.83   GLUCOSE mg/dL 119* 144* 137* 196*   Estimated Creatinine Clearance: 141.7 mL/min (A) (by C-G formula based on SCr of 0.51 mg/dL (L)).  Results from last 7 days   Lab Units 06/11/23  1226   ALBUMIN g/dL 3.8   BILIRUBIN mg/dL 0.8   ALK PHOS U/L 143*   AST (SGOT) U/L 30   ALT (SGPT) U/L 32     Results from last 7 days   Lab Units 06/15/23  0809 06/14/23  0435 06/13/23 0328 06/12/23  0141 06/11/23  1226   CALCIUM mg/dL 7.6* 7.6* 8.6 9.1 9.4   ALBUMIN g/dL  --   --   --   --  3.8   MAGNESIUM mg/dL 1.7  --   --   --   --        Results from last 7 days   Lab Units 06/11/23  1734 06/11/23  1226   HSTROP T ng/L 15* 16*   PROBNP pg/mL  --  265.0           Invalid input(s): LDLCALC  Results from last 7 days   Lab Units 06/11/23  1355 06/11/23  1341   BLOODCX  No growth at 3 days Staphylococcus, coagulase negative*   BCIDPCR   --  Staph spp, not aureus or lugdunensis. Identification by BCID2 PCR.*       Test Results Pending at Discharge     Pending Labs     Order Current Status    Blood Culture - Blood, Arm, Left Preliminary result          Discharge Details        Discharge Medications      New Medications      Instructions Start Date   cefdinir 300 MG capsule  Commonly known as: OMNICEF   300 mg, Oral, 2 Times Daily         Continue These Medications      Instructions Start Date   acetaminophen 500 MG tablet  Commonly known as: TYLENOL   500 mg, Per G Tube, Every 4 Hours PRN      Aspirin 81 MG capsule   Per G Tube, Daily      donepezil 10 MG tablet  Commonly known as: ARICEPT   10 mg, Per G  Tube, Nightly      glycopyrrolate 1 MG tablet  Commonly known as: ROBINUL   1 mg, Per G Tube, 2 Times Daily      ipratropium-albuterol 0.5-2.5 mg/3 ml nebulizer  Commonly known as: DUO-NEB   3 mL, Nebulization, Every 4 Hours PRN      loperamide 2 MG capsule  Commonly known as: IMODIUM   2 mg, Per G Tube, Every 6 Hours PRN      omeprazole 40 MG capsule  Commonly known as: priLOSEC   40 mg, Per G Tube, Daily      polyvinyl alcohol 1.4 % ophthalmic solution  Commonly known as: LIQUIFILM   1 drop, Right Eye, Every 4 Hours PRN      simvastatin 40 MG tablet  Commonly known as: ZOCOR   40 mg, Per G Tube, Nightly      tamsulosin 0.4 MG capsule 24 hr capsule  Commonly known as: FLOMAX   1 capsule, Daily      traZODone 50 MG tablet  Commonly known as: DESYREL   50 mg, Per G Tube, Nightly         Stop These Medications    carvedilol 3.125 MG tablet  Commonly known as: COREG            Allergies   Allergen Reactions   • Bupropion Other (See Comments)         Discharge Disposition:  Skilled Nursing Facility ([place] - [place])    Discharge Diet:  Diet Order   Procedures   • NPO Diet NPO Type: Strict NPO       Discharge Activity:   Activity Instructions     Activity as Tolerated            CODE STATUS:    Code Status and Medical Interventions:   Ordered at: 06/11/23 8893     Medical Intervention Limits:    NO intubation (DNI)     Code Status (Patient has no pulse and is not breathing):    No CPR (Do Not Attempt to Resuscitate)     Medical Interventions (Patient has pulse or is breathing):    Limited Support       No future appointments.  Additional Instructions for the Follow-ups that You Need to Schedule     Call MD With Problems / Concerns   As directed      Instructions: return to the hospital if you experience chest pain, shortness of breath, abdominal pain, nausea, vomiting, fevers, sweats, chills, or worsening of your symptoms    Order Comments: Instructions: return to the hospital if you experience chest pain, shortness of  breath, abdominal pain, nausea, vomiting, fevers, sweats, chills, or worsening of your symptoms          Discharge Follow-up with PCP   As directed       Currently Documented PCP:    Lali Fink MD    PCP Phone Number:    [phone]     Follow Up Details: 2 weeks            Contact information for follow-up providers     Lali Fink MD .    Specialty: Family Medicine  Why: 2 weeks  Contact information:  [address]  [address] [address]  [phone]                   Contact information for after-discharge care     Destination     [facility] .    Service: Nursing Home  [facility]:  [address] [facility] [id number] [phone]                             Additional Instructions for the Follow-ups that You Need to Schedule     Call MD With Problems / Concerns   As directed      Instructions: return to the hospital if you experience chest pain, shortness of breath, abdominal pain, nausea, vomiting, fevers, sweats, chills, or worsening of your symptoms    Order Comments: Instructions: return to the hospital if you experience chest pain, shortness of breath, abdominal pain, nausea, vomiting, fevers, sweats, chills, or worsening of your symptoms          Discharge Follow-up with PCP   As directed       Currently Documented PCP:    Lali Fink MD    PCP Phone Number:    [phone]     Follow Up Details: 2 weeks           Time Spent on Discharge:  Greater than 30 minutes      Kristian Herrera MD  [facility]  06/15/23  13:38 EDT

## 2023-06-15 NOTE — PLAN OF CARE
Wakes to voice. Answers Y/N question. Tube feeds, water flush, tolerating well. Peg tube. IV saline locked. Room air. Plan back to [facility] post actue.       Problem: Fall Injury Risk  Goal: Absence of Fall and Fall-Related Injury  Intervention: Promote Injury-Free Environment  Recent Flowsheet Documentation  Taken 6/15/2023 0151 by Diann Dominguez RN  Safety Promotion/Fall Prevention: safety round/check completed  Taken 6/14/2023 2358 by Diann Dominguez RN  Safety Promotion/Fall Prevention: safety round/check completed  Taken 6/14/2023 2200 by Diann Dominguez RN  Safety Promotion/Fall Prevention: safety round/check completed  Taken 6/14/2023 1951 by Diann Dominguez RN  Safety Promotion/Fall Prevention: safety round/check completed     Problem: Skin Injury Risk Increased  Goal: Skin Health and Integrity  Intervention: Optimize Skin Protection  Recent Flowsheet Documentation  Taken 6/15/2023 0151 by Diann Dominguez RN  Head of Bed (HOB) Positioning: HOB elevated  Taken 6/14/2023 2358 by Diann Dominguez RN  Pressure Reduction Techniques: weight shift assistance provided  Head of Bed (HOB) Positioning: HOB elevated  Pressure Reduction Devices: alternating pressure pump (ADD)  Skin Protection:   adhesive use limited   incontinence pads utilized  Taken 6/14/2023 2200 by Diann Dominguez RN  Head of Bed (HOB) Positioning: HOB elevated  Taken 6/14/2023 1951 by Diann Dominguez RN  Pressure Reduction Techniques: weight shift assistance provided  Head of Bed (HOB) Positioning: HOB elevated  Pressure Reduction Devices: alternating pressure pump (ADD)  Skin Protection: adhesive use limited   Goal Outcome Evaluation:

## 2023-06-16 LAB — BACTERIA SPEC AEROBE CULT: NORMAL

## 2024-03-08 ENCOUNTER — HOSPITAL ENCOUNTER (EMERGENCY)
Facility: HOSPITAL | Age: 69
Discharge: HOME | End: 2024-03-08
Attending: STUDENT IN AN ORGANIZED HEALTH CARE EDUCATION/TRAINING PROGRAM
Payer: MEDICARE

## 2024-03-08 ENCOUNTER — APPOINTMENT (OUTPATIENT)
Dept: GENERAL RADIOLOGY | Facility: HOSPITAL | Age: 69
End: 2024-03-08
Payer: MEDICARE

## 2024-03-08 VITALS
RESPIRATION RATE: 24 BRPM | TEMPERATURE: 98.2 F | DIASTOLIC BLOOD PRESSURE: 85 MMHG | HEART RATE: 101 BPM | SYSTOLIC BLOOD PRESSURE: 134 MMHG | OXYGEN SATURATION: 95 %

## 2024-03-08 DIAGNOSIS — I47.10 SVT (SUPRAVENTRICULAR TACHYCARDIA): Primary | ICD-10-CM

## 2024-03-08 DIAGNOSIS — T85.528A DISLODGED GASTROSTOMY TUBE: ICD-10-CM

## 2024-03-08 DIAGNOSIS — K59.00 CONSTIPATION, UNSPECIFIED CONSTIPATION TYPE: ICD-10-CM

## 2024-03-08 LAB
ALBUMIN SERPL-MCNC: 3.7 G/DL (ref 3.5–5.2)
ALBUMIN/GLOB SERPL: 0.9 G/DL
ALP SERPL-CCNC: 160 U/L (ref 39–117)
ALT SERPL W P-5'-P-CCNC: 19 U/L (ref 1–41)
ANION GAP SERPL CALCULATED.3IONS-SCNC: 11.9 MMOL/L (ref 5–15)
AST SERPL-CCNC: 20 U/L (ref 1–40)
BASOPHILS # BLD AUTO: 0.07 10*3/MM3 (ref 0–0.2)
BASOPHILS NFR BLD AUTO: 0.6 % (ref 0–1.5)
BILIRUB SERPL-MCNC: 0.6 MG/DL (ref 0–1.2)
BUN SERPL-MCNC: 10 MG/DL (ref 8–23)
BUN/CREAT SERPL: 15.2 (ref 7–25)
CALCIUM SPEC-SCNC: 8.8 MG/DL (ref 8.6–10.5)
CHLORIDE SERPL-SCNC: 103 MMOL/L (ref 98–107)
CO2 SERPL-SCNC: 24.1 MMOL/L (ref 22–29)
CREAT SERPL-MCNC: 0.66 MG/DL (ref 0.76–1.27)
DEPRECATED RDW RBC AUTO: 41.5 FL (ref 37–54)
EGFRCR SERPLBLD CKD-EPI 2021: 102.2 ML/MIN/1.73
EOSINOPHIL # BLD AUTO: 0.31 10*3/MM3 (ref 0–0.4)
EOSINOPHIL NFR BLD AUTO: 2.5 % (ref 0.3–6.2)
ERYTHROCYTE [DISTWIDTH] IN BLOOD BY AUTOMATED COUNT: 13.3 % (ref 12.3–15.4)
GEN 5 2HR TROPONIN T REFLEX: 21 NG/L
GLOBULIN UR ELPH-MCNC: 4.1 GM/DL
GLUCOSE SERPL-MCNC: 156 MG/DL (ref 65–99)
HCT VFR BLD AUTO: 45.5 % (ref 37.5–51)
HGB BLD-MCNC: 15 G/DL (ref 13–17.7)
IMM GRANULOCYTES # BLD AUTO: 0.04 10*3/MM3 (ref 0–0.05)
IMM GRANULOCYTES NFR BLD AUTO: 0.3 % (ref 0–0.5)
LYMPHOCYTES # BLD AUTO: 2.38 10*3/MM3 (ref 0.7–3.1)
LYMPHOCYTES NFR BLD AUTO: 19.5 % (ref 19.6–45.3)
MAGNESIUM SERPL-MCNC: 2 MG/DL (ref 1.6–2.4)
MCH RBC QN AUTO: 28.3 PG (ref 26.6–33)
MCHC RBC AUTO-ENTMCNC: 33 G/DL (ref 31.5–35.7)
MCV RBC AUTO: 85.8 FL (ref 79–97)
MONOCYTES # BLD AUTO: 1.04 10*3/MM3 (ref 0.1–0.9)
MONOCYTES NFR BLD AUTO: 8.5 % (ref 5–12)
NEUTROPHILS NFR BLD AUTO: 68.6 % (ref 42.7–76)
NEUTROPHILS NFR BLD AUTO: 8.36 10*3/MM3 (ref 1.7–7)
NRBC BLD AUTO-RTO: 0 /100 WBC (ref 0–0.2)
NT-PROBNP SERPL-MCNC: 995 PG/ML (ref 0–900)
PLATELET # BLD AUTO: 305 10*3/MM3 (ref 140–450)
PMV BLD AUTO: 11.4 FL (ref 6–12)
POTASSIUM SERPL-SCNC: 3.9 MMOL/L (ref 3.5–5.2)
PROT SERPL-MCNC: 7.8 G/DL (ref 6–8.5)
QT INTERVAL: 300 MS
QT INTERVAL: 390 MS
QTC INTERVAL: 515 MS
QTC INTERVAL: 516 MS
RBC # BLD AUTO: 5.3 10*6/MM3 (ref 4.14–5.8)
SODIUM SERPL-SCNC: 139 MMOL/L (ref 136–145)
TROPONIN T DELTA: 0 NG/L
TROPONIN T SERPL HS-MCNC: 21 NG/L
WBC NRBC COR # BLD AUTO: 12.2 10*3/MM3 (ref 3.4–10.8)

## 2024-03-08 PROCEDURE — 71045 X-RAY EXAM CHEST 1 VIEW: CPT

## 2024-03-08 PROCEDURE — 93010 ELECTROCARDIOGRAM REPORT: CPT | Performed by: INTERNAL MEDICINE

## 2024-03-08 PROCEDURE — 96374 THER/PROPH/DIAG INJ IV PUSH: CPT

## 2024-03-08 PROCEDURE — 25810000003 SODIUM CHLORIDE 0.9 % SOLUTION: Performed by: PHYSICIAN ASSISTANT

## 2024-03-08 PROCEDURE — 84484 ASSAY OF TROPONIN QUANT: CPT | Performed by: PHYSICIAN ASSISTANT

## 2024-03-08 PROCEDURE — 85025 COMPLETE CBC W/AUTO DIFF WBC: CPT | Performed by: PHYSICIAN ASSISTANT

## 2024-03-08 PROCEDURE — 36415 COLL VENOUS BLD VENIPUNCTURE: CPT

## 2024-03-08 PROCEDURE — 93005 ELECTROCARDIOGRAM TRACING: CPT

## 2024-03-08 PROCEDURE — 99284 EMERGENCY DEPT VISIT MOD MDM: CPT

## 2024-03-08 PROCEDURE — 83735 ASSAY OF MAGNESIUM: CPT | Performed by: PHYSICIAN ASSISTANT

## 2024-03-08 PROCEDURE — 80053 COMPREHEN METABOLIC PANEL: CPT | Performed by: PHYSICIAN ASSISTANT

## 2024-03-08 PROCEDURE — 93005 ELECTROCARDIOGRAM TRACING: CPT | Performed by: PHYSICIAN ASSISTANT

## 2024-03-08 PROCEDURE — 0 DIATRIZOATE MEGLUMINE & SODIUM PER 1 ML: Performed by: STUDENT IN AN ORGANIZED HEALTH CARE EDUCATION/TRAINING PROGRAM

## 2024-03-08 PROCEDURE — 74018 RADEX ABDOMEN 1 VIEW: CPT

## 2024-03-08 PROCEDURE — 83880 ASSAY OF NATRIURETIC PEPTIDE: CPT | Performed by: PHYSICIAN ASSISTANT

## 2024-03-08 RX ORDER — SODIUM CHLORIDE 0.9 % (FLUSH) 0.9 %
10 SYRINGE (ML) INJECTION AS NEEDED
Status: DISCONTINUED | OUTPATIENT
Start: 2024-03-08 | End: 2024-03-08 | Stop reason: HOSPADM

## 2024-03-08 RX ORDER — POLYETHYLENE GLYCOL 3350 17 G/17G
17 POWDER, FOR SOLUTION ORAL DAILY
Qty: 510 G | Refills: 0 | Status: SHIPPED | OUTPATIENT
Start: 2024-03-08

## 2024-03-08 RX ORDER — ADENOSINE 3 MG/ML
12 INJECTION, SOLUTION INTRAVENOUS ONCE
Status: DISCONTINUED | OUTPATIENT
Start: 2024-03-08 | End: 2024-03-08

## 2024-03-08 RX ORDER — DILTIAZEM HYDROCHLORIDE 5 MG/ML
10 INJECTION INTRAVENOUS ONCE
Status: COMPLETED | OUTPATIENT
Start: 2024-03-08 | End: 2024-03-08

## 2024-03-08 RX ADMIN — DILTIAZEM HYDROCHLORIDE 10 MG: 5 INJECTION INTRAVENOUS at 06:56

## 2024-03-08 RX ADMIN — DIATRIZOATE MEGLUMINE AND DIATRIZOATE SODIUM 30 ML: 660; 100 LIQUID ORAL; RECTAL at 07:36

## 2024-03-08 RX ADMIN — SODIUM CHLORIDE 1000 ML: 9 INJECTION, SOLUTION INTRAVENOUS at 06:51

## 2024-03-08 NOTE — ED PROVIDER NOTES
EMERGENCY DEPARTMENT ENCOUNTER    Room Number:  11/11  Date of encounter:  3/8/2024  PCP: Lali Fink MD  Historian: EMS  Chronic or social conditions impacting care (social determinants of health): DNR from nursing facility    HPI:  Chief Complaint: Dislodged gastrostomy tube, tachycardia  A complete HPI/ROS/PMH/PSH/SH/FH are unobtainable due to: Anoxic brain injury, dementia    Context: Aquiles Bahena is a 68 y.o. male with a history of anoxic brain injury, dementia, coronary artery disease, aspiration pneumonia.  He presents to the ED c/o acute dislodged feeding tube, as well as tachycardia.  Originally EMS was called to the facility for a dislodged gastrostomy tube.  On scene the patient was found to have a heart rate in the 170s.  The patient himself does not have any complaints.  He is able to answer yes or no questions.  Blood pressure and O2 sats are stable.    Review of prior external notes (non-ED):   I reviewed admission from 6/11/2023.  Patient admitted for aspiration pneumonia.    Review of prior external test results outside of this encounter:  I reviewed a BMP from 6/15/2023.  Creatinine 0.51, potassium 3.9.    PAST MEDICAL HISTORY  Active Ambulatory Problems     Diagnosis Date Noted    Anoxic brain injury 05/16/2023    Dementia 05/16/2023    Dysphagia 05/16/2023    GERD (gastroesophageal reflux disease) 05/16/2023    CAD (coronary artery disease) 05/16/2023    Left lower lobe pneumonia 05/16/2023    Gastrostomy tube dysfunction 05/16/2023    BPH (benign prostatic hyperplasia) 05/16/2023    HTN (hypertension) 05/16/2023    HLD (hyperlipidemia) 05/16/2023    Pneumonia of left lower lobe due to infectious organism 05/17/2023     Resolved Ambulatory Problems     Diagnosis Date Noted    Aspiration pneumonia of left lower lobe, unspecified aspiration pneumonia type 06/11/2023    Hypoxia 06/12/2023     Past Medical History:   Diagnosis Date    Anoxic brain damage     Anxiety     Arthritis      Coronary artery disease     COVID-19     Hyperlipidemia     Hypotension     Smoking          PAST SURGICAL HISTORY  Past Surgical History:   Procedure Laterality Date    GTUBE INSERTION           FAMILY HISTORY  No family history on file.      SOCIAL HISTORY  Social History     Socioeconomic History    Marital status:    Tobacco Use    Smoking status: Former     Types: Cigarettes     Passive exposure: Past   Vaping Use    Vaping status: Unknown   Substance and Sexual Activity    Alcohol use: Defer    Drug use: Defer    Sexual activity: Defer         ALLERGIES  Bupropion        REVIEW OF SYSTEMS  Unable to obtain secondary to anoxic brain injury      PHYSICAL EXAM    I have reviewed the triage vital signs and nursing notes.    ED Triage Vitals   Temp Heart Rate Resp BP SpO2   03/08/24 0648 03/08/24 0646 03/08/24 0648 03/08/24 0648 03/08/24 0646   98.2 °F (36.8 °C) (!) 171 24 122/78 94 %      Temp src Heart Rate Source Patient Position BP Location FiO2 (%)   -- -- -- -- --              Physical Exam  GENERAL: Alert, oriented to self, not distressed  HENT: head atraumatic, no nuchal rigidity  EYES: no scleral icterus, EOMI  CV: regular rhythm, tachycardic rate, no murmur  RESPIRATORY: normal effort, CTA  ABDOMEN: soft, nontender.  Stoma in mid abdomen without bleeding.  MUSCULOSKELETAL: no deformity, FROM, atrophy and contractures of lower extremities, no pedal edema.  NEURO: alert, moves all extremities, follows commands  SKIN: warm, dry        LAB RESULTS  Recent Results (from the past 24 hour(s))   ECG 12 Lead Rhythm Change    Collection Time: 03/08/24  6:44 AM   Result Value Ref Range    QT Interval 300 ms    QTC Interval 515 ms   Comprehensive Metabolic Panel    Collection Time: 03/08/24  6:48 AM    Specimen: Blood   Result Value Ref Range    Glucose 156 (H) 65 - 99 mg/dL    BUN 10 8 - 23 mg/dL    Creatinine 0.66 (L) 0.76 - 1.27 mg/dL    Sodium 139 136 - 145 mmol/L    Potassium 3.9 3.5 - 5.2 mmol/L     Chloride 103 98 - 107 mmol/L    CO2 24.1 22.0 - 29.0 mmol/L    Calcium 8.8 8.6 - 10.5 mg/dL    Total Protein 7.8 6.0 - 8.5 g/dL    Albumin 3.7 3.5 - 5.2 g/dL    ALT (SGPT) 19 1 - 41 U/L    AST (SGOT) 20 1 - 40 U/L    Alkaline Phosphatase 160 (H) 39 - 117 U/L    Total Bilirubin 0.6 0.0 - 1.2 mg/dL    Globulin 4.1 gm/dL    A/G Ratio 0.9 g/dL    BUN/Creatinine Ratio 15.2 7.0 - 25.0    Anion Gap 11.9 5.0 - 15.0 mmol/L    eGFR 102.2 >60.0 mL/min/1.73   Magnesium    Collection Time: 03/08/24  6:48 AM    Specimen: Blood   Result Value Ref Range    Magnesium 2.0 1.6 - 2.4 mg/dL   High Sensitivity Troponin T    Collection Time: 03/08/24  6:48 AM    Specimen: Blood   Result Value Ref Range    HS Troponin T 21 <22 ng/L   BNP    Collection Time: 03/08/24  6:48 AM    Specimen: Blood   Result Value Ref Range    proBNP 995.0 (H) 0.0 - 900.0 pg/mL   CBC Auto Differential    Collection Time: 03/08/24  6:48 AM    Specimen: Blood   Result Value Ref Range    WBC 12.20 (H) 3.40 - 10.80 10*3/mm3    RBC 5.30 4.14 - 5.80 10*6/mm3    Hemoglobin 15.0 13.0 - 17.7 g/dL    Hematocrit 45.5 37.5 - 51.0 %    MCV 85.8 79.0 - 97.0 fL    MCH 28.3 26.6 - 33.0 pg    MCHC 33.0 31.5 - 35.7 g/dL    RDW 13.3 12.3 - 15.4 %    RDW-SD 41.5 37.0 - 54.0 fl    MPV 11.4 6.0 - 12.0 fL    Platelets 305 140 - 450 10*3/mm3    Neutrophil % 68.6 42.7 - 76.0 %    Lymphocyte % 19.5 (L) 19.6 - 45.3 %    Monocyte % 8.5 5.0 - 12.0 %    Eosinophil % 2.5 0.3 - 6.2 %    Basophil % 0.6 0.0 - 1.5 %    Immature Grans % 0.3 0.0 - 0.5 %    Neutrophils, Absolute 8.36 (H) 1.70 - 7.00 10*3/mm3    Lymphocytes, Absolute 2.38 0.70 - 3.10 10*3/mm3    Monocytes, Absolute 1.04 (H) 0.10 - 0.90 10*3/mm3    Eosinophils, Absolute 0.31 0.00 - 0.40 10*3/mm3    Basophils, Absolute 0.07 0.00 - 0.20 10*3/mm3    Immature Grans, Absolute 0.04 0.00 - 0.05 10*3/mm3    nRBC 0.0 0.0 - 0.2 /100 WBC   ECG 12 Lead Rhythm Change    Collection Time: 03/08/24  7:52 AM   Result Value Ref Range    QT Interval 390  ms    QTC Interval 516 ms   High Sensitivity Troponin T 2Hr    Collection Time: 03/08/24  9:06 AM    Specimen: Blood   Result Value Ref Range    HS Troponin T 21 <22 ng/L    Troponin T Delta 0 >=-4 - <+4 ng/L       Ordered the above labs and independently reviewed the results.        RADIOLOGY  XR Abdomen KUB    Result Date: 3/8/2024  XR ABDOMEN KUB-  Clinical: G-tube replacement without difficulty  Approximately 30 mL of Gastrografin was instilled through the newly placed G-tube. Single KUB taken prior to and after the injection.  Contrast material fills the stomach in an antegrade and retrograde fashion. Some of the contrast migrates into the second duodenum. G-tube tip superimposes the mid gastric lumen. No extravasation seen.  Incidentally noted is rectal impaction.  CONCLUSION: G-tube position is satisfactory. Rectal impaction.  This report was finalized on 3/8/2024 8:23 AM by Dr. Sudheer Neal M.D on Workstation: [id number]      XR Chest 1 View    Result Date: 3/8/2024  XR CHEST 1 VW-  Clinical: Shortness of breath  COMPARISON examination 6/11/2023  FINDINGS: Cardiomediastinal silhouette is stable. Cardiac size within normal limits. No effusion, edema or acute airspace disease. Elevation of the right hemidiaphragm as before.  CONCLUSION: Stable chest  This report was finalized on 3/8/2024 8:21 AM by Dr. Sudheer Neal M.D on Workstation: [id number]       I ordered the above noted radiological studies. Reviewed by me and discussed with radiologist.  See dictation for official radiology interpretation.      MEDICATIONS GIVEN IN ER    Medications   sodium chloride 0.9 % bolus 1,000 mL (0 mL Intravenous Stopped 3/8/24 0904)   dilTIAZem (CARDIZEM) injection 10 mg (10 mg Intravenous Given 3/8/24 0656)   diatrizoate meglumine-sodium (GASTROGRAFIN) 66-10 % oral solution 30 mL (30 mL Per G Tube Given 3/8/24 0736)     Feeding Tube Replacement    Date/Time: 3/8/2024 3:58 PM    Performed by: Noe Streeter PA Authorized  by: Chuy Hollis MD    Consent:     Consent obtained:  Verbal  Universal protocol:     Patient identity confirmed:  Arm band  Pre-procedure details:     Old tube type:  Gastrostomy  Procedure details:     Patient position:  Supine    Procedure type:  Replacement    Tube type:  Gastrostomy    Tube size: 20 M[place]gasque.    Bulb inflation volume:  10    Bulb inflation fluid:  Normal saline  Post-procedure details:     Placement/position confirmation:  X-ray    Placement difficulty:  None    Bleeding:  None    Procedure completion:  Tolerated well, no immediate complications        ADDITIONAL ORDERS CONSIDERED BUT NOT ORDERED:  Considered admission however patient is hemodynamically stable.  Heart rate improved.      PROGRESS, DATA ANALYSIS, CONSULTS, AND MEDICAL DECISION MAKING    All labs have been independently interpreted by myself.  All radiology studies have been independently interpreted by myself and discussed with radiologist dictating the report.   EKG's independently interpreted by myself.  Discussion below represents my analysis of pertinent findings related to patient's condition, differential diagnosis, treatment plan and final disposition.    I have discussed case with Dr. Hollis, emergency room physician.  He has performed his own bedside examination and agrees with treatment plan.    ED Course as of 03/08/24 1600   Fri Mar 08, 2024   0648 Patient to call head from EMS for tachycardia.  Original call was for dislodged gastrostomy tube.  Patient has stable blood pressure and O2 sats.  Heart rate in the 170s.  Appears to be SVT.  Cardizem ordered. [EE]   0701 After diltiazem, heart rate 108, normal sinus rhythm. [EE]   0701 Gastrostomy tube replaced without difficulty.  Plan for confirmatory x-rays. [EE]   0721 Creatinine(!): 0.66 [EE]   0721 Magnesium: 2.0 [EE]   0721 HS Troponin T: 21 [EE]   0734 Heart Rate: 102 [FS]   0748 EKG interpreted myself:  644, SVT with aberrancy at rate of 177, pre-existing right  bundle branch block.  No ischemic changes. [FS]   0752 Chest x-ray interpreted myself:  No pneumothorax, possible cystic lesion on right lung although may be due to superposition [FS]   0755 EKG interpreted myself:  752, sinus tachycardia rate of 105, right bundle branch block, no ischemic changes. [FS]   0934 Troponin T Delta: 0 [EE]   1007 Patient's heart rate has remained in the low 100s here, normal sinus rhythm.  Gastrostomy tube has been replaced.  Stable vitals.  We will discharge.    Incidental constipation seen on x-rays.  Patient denies any abdominal pain.  He states he has had a daily bowel movement for the past several days.  They will treat this at the nursing home. [EE]      ED Course User Index  [EE] Noe Streeter PA  [FS] Chuy Hollis MD       AS OF 16:00 EST VITALS:    BP - 134/85  HR - 101  TEMP - 98.2 °F (36.8 °C)  O2 SATS - 95%        DIAGNOSIS  Final diagnoses:   SVT (supraventricular tachycardia)   Dislodged gastrostomy tube   Constipation, unspecified constipation type         DISPOSITION  Admitted    Dictated utilizing Dragon dictation     Noe Streeter PA  03/08/24 1600

## 2024-03-08 NOTE — ED TRIAGE NOTES
Pt to er via ems from [facility] reports gtube dislodged. Upon ems arrival pt hr was 180bpm. Pt nonverbal at basesline.

## 2024-03-08 NOTE — ED PROVIDER NOTES
MD ATTESTATION NOTE    The MANJEET and I have discussed this patient's history, physical exam, and treatment plan.  I have reviewed the documentation and personally had a face to face interaction with the patient. I affirm the documentation and agree with the treatment and plan.  The attached note describes my personal findings.      I provided a substantive portion of the care of the patient.  I personally performed the physical exam in its entirety, and below are my findings.        Brief HPI: Patient presented emergency department with dislodged feeding tube and elevated heart rate.  Patient has no complaints.  Able to answer yes or no.  Chronically ill and lives at a facility.    PHYSICAL EXAM  ED Triage Vitals   Temp Heart Rate Resp BP SpO2   03/08/24 0648 03/08/24 0646 03/08/24 0648 03/08/24 0648 03/08/24 0646   98.2 °F (36.8 °C) (!) 171 24 122/78 94 %      Temp src Heart Rate Source Patient Position BP Location FiO2 (%)   -- -- 03/08/24 0703 03/08/24 0703 --     Lying Left arm          GENERAL: no acute distress  HENT: nares patent  EYES: no scleral icterus  CV: regular rhythm, tachycardic rate  RESPIRATORY: normal effort  ABDOMEN: soft, G-tube site without G-tube  NEURO: At baseline  PSYCH:  calm, cooperative  SKIN: warm, dry    Vital signs and nursing notes reviewed.        Plan: Patient emergency department with elevated heart rate, dislodged G-tube.  Otherwise well-appearing, vitals otherwise stable.  EKG demonstrating what appears to be SVT.  Treated with diltiazem with resolution of tach arrhythmia.  Labs and imaging otherwise reassuring.  G-tube replaced without complication.  Will discharge with supportive care and outpatient follow-up.  Given return precautions and discharged home.    ED Course as of 03/08/24 1554   Fri Mar 08, 2024   0648 Patient to call head from EMS for tachycardia.  Original call was for dislodged gastrostomy tube.  Patient has stable blood pressure and O2 sats.  Heart rate in the  170s.  Appears to be SVT.  Adenosine ordered. [EE]   0701 After diltiazem, heart rate 108, normal sinus rhythm. [EE]   0701 Gastrostomy tube replaced without difficulty.  Plan for confirmatory x-rays. [EE]   0721 Creatinine(!): 0.66 [EE]   0721 Magnesium: 2.0 [EE]   0721 HS Troponin T: 21 [EE]   0734 Heart Rate: 102 [FS]   0748 EKG interpreted myself:  644, SVT with aberrancy at rate of 177, pre-existing right bundle branch block.  No ischemic changes. [FS]   0752 Chest x-ray interpreted myself:  No pneumothorax, possible cystic lesion on right lung although may be due to superposition [FS]   0755 EKG interpreted myself:  752, sinus tachycardia rate of 105, right bundle branch block, no ischemic changes. [FS]   0934 Troponin T Delta: 0 [EE]   1007 Patient's heart rate has remained in the low 100s here, normal sinus rhythm.  Gastrostomy tube has been replaced.  Stable vitals.  We will discharge.    Incidental constipation seen on x-rays.  Patient denies any abdominal pain.  He states he has had a daily bowel movement for the past several days.  They will treat this at the nursing home. [EE]      ED Course User Index  [EE] Noe Streeter, PA  [FS] Chuy Hollis MD       SHARED VISIT: This visit was performed by BOTH a physician and an APC. The substantive portion of the medical decision making was performed by this attesting physician who made or approved the management plan and takes responsibility for patient management. All studies in the APC note (if performed) were independently interpreted by me.        Chuy Hollis MD  03/08/24 4731

## 2024-07-24 ENCOUNTER — HOSPITAL ENCOUNTER (INPATIENT)
Facility: HOSPITAL | Age: 69
LOS: 1 days | Discharge: NURSING, REHAB OR LONG-TERM CARE | DRG: 291 | End: 2024-07-26
Attending: EMERGENCY MEDICINE | Admitting: STUDENT IN AN ORGANIZED HEALTH CARE EDUCATION/TRAINING PROGRAM
Payer: MEDICARE

## 2024-07-24 ENCOUNTER — APPOINTMENT (OUTPATIENT)
Dept: GENERAL RADIOLOGY | Facility: HOSPITAL | Age: 69
DRG: 291 | End: 2024-07-24
Payer: MEDICARE

## 2024-07-24 DIAGNOSIS — I47.19 ATRIAL TACHYCARDIA: ICD-10-CM

## 2024-07-24 DIAGNOSIS — I50.9 ACUTE CONGESTIVE HEART FAILURE, UNSPECIFIED HEART FAILURE TYPE: Primary | ICD-10-CM

## 2024-07-24 LAB
ALBUMIN SERPL-MCNC: 3.6 G/DL (ref 3.5–5.2)
ALBUMIN/GLOB SERPL: 0.9 G/DL
ALP SERPL-CCNC: 138 U/L (ref 39–117)
ALT SERPL W P-5'-P-CCNC: 22 U/L (ref 1–41)
ANION GAP SERPL CALCULATED.3IONS-SCNC: 10.3 MMOL/L (ref 5–15)
AST SERPL-CCNC: 18 U/L (ref 1–40)
B PARAPERT DNA SPEC QL NAA+PROBE: NOT DETECTED
B PERT DNA SPEC QL NAA+PROBE: NOT DETECTED
BASOPHILS # BLD AUTO: 0.04 10*3/MM3 (ref 0–0.2)
BASOPHILS NFR BLD AUTO: 0.6 % (ref 0–1.5)
BILIRUB SERPL-MCNC: 0.5 MG/DL (ref 0–1.2)
BUN SERPL-MCNC: 10 MG/DL (ref 8–23)
BUN/CREAT SERPL: 14.7 (ref 7–25)
C PNEUM DNA NPH QL NAA+NON-PROBE: NOT DETECTED
CALCIUM SPEC-SCNC: 8.3 MG/DL (ref 8.6–10.5)
CHLORIDE SERPL-SCNC: 106 MMOL/L (ref 98–107)
CO2 SERPL-SCNC: 21.7 MMOL/L (ref 22–29)
CREAT SERPL-MCNC: 0.68 MG/DL (ref 0.76–1.27)
DEPRECATED RDW RBC AUTO: 44.8 FL (ref 37–54)
EGFRCR SERPLBLD CKD-EPI 2021: 100.6 ML/MIN/1.73
EOSINOPHIL # BLD AUTO: 0.16 10*3/MM3 (ref 0–0.4)
EOSINOPHIL NFR BLD AUTO: 2.5 % (ref 0.3–6.2)
ERYTHROCYTE [DISTWIDTH] IN BLOOD BY AUTOMATED COUNT: 14 % (ref 12.3–15.4)
FLUAV SUBTYP SPEC NAA+PROBE: NOT DETECTED
FLUBV RNA ISLT QL NAA+PROBE: NOT DETECTED
GLOBULIN UR ELPH-MCNC: 3.8 GM/DL
GLUCOSE BLDC GLUCOMTR-MCNC: 136 MG/DL (ref 70–130)
GLUCOSE SERPL-MCNC: 163 MG/DL (ref 65–99)
HADV DNA SPEC NAA+PROBE: NOT DETECTED
HCOV 229E RNA SPEC QL NAA+PROBE: NOT DETECTED
HCOV HKU1 RNA SPEC QL NAA+PROBE: NOT DETECTED
HCOV NL63 RNA SPEC QL NAA+PROBE: NOT DETECTED
HCOV OC43 RNA SPEC QL NAA+PROBE: NOT DETECTED
HCT VFR BLD AUTO: 48.3 % (ref 37.5–51)
HGB BLD-MCNC: 15.5 G/DL (ref 13–17.7)
HMPV RNA NPH QL NAA+NON-PROBE: NOT DETECTED
HPIV1 RNA ISLT QL NAA+PROBE: NOT DETECTED
HPIV2 RNA SPEC QL NAA+PROBE: NOT DETECTED
HPIV3 RNA NPH QL NAA+PROBE: NOT DETECTED
HPIV4 P GENE NPH QL NAA+PROBE: NOT DETECTED
IMM GRANULOCYTES # BLD AUTO: 0.02 10*3/MM3 (ref 0–0.05)
IMM GRANULOCYTES NFR BLD AUTO: 0.3 % (ref 0–0.5)
LYMPHOCYTES # BLD AUTO: 0.79 10*3/MM3 (ref 0.7–3.1)
LYMPHOCYTES NFR BLD AUTO: 12.6 % (ref 19.6–45.3)
M PNEUMO IGG SER IA-ACNC: NOT DETECTED
MCH RBC QN AUTO: 28.3 PG (ref 26.6–33)
MCHC RBC AUTO-ENTMCNC: 32.1 G/DL (ref 31.5–35.7)
MCV RBC AUTO: 88.1 FL (ref 79–97)
MONOCYTES # BLD AUTO: 0.48 10*3/MM3 (ref 0.1–0.9)
MONOCYTES NFR BLD AUTO: 7.6 % (ref 5–12)
NEUTROPHILS NFR BLD AUTO: 4.79 10*3/MM3 (ref 1.7–7)
NEUTROPHILS NFR BLD AUTO: 76.4 % (ref 42.7–76)
NRBC BLD AUTO-RTO: 0 /100 WBC (ref 0–0.2)
NT-PROBNP SERPL-MCNC: 3399 PG/ML (ref 0–900)
PLATELET # BLD AUTO: 203 10*3/MM3 (ref 140–450)
PMV BLD AUTO: 11.6 FL (ref 6–12)
POTASSIUM SERPL-SCNC: 4.1 MMOL/L (ref 3.5–5.2)
PROT SERPL-MCNC: 7.4 G/DL (ref 6–8.5)
QT INTERVAL: 384 MS
QTC INTERVAL: 518 MS
RBC # BLD AUTO: 5.48 10*6/MM3 (ref 4.14–5.8)
RHINOVIRUS RNA SPEC NAA+PROBE: NOT DETECTED
RSV RNA NPH QL NAA+NON-PROBE: NOT DETECTED
SARS-COV-2 RNA NPH QL NAA+NON-PROBE: NOT DETECTED
SODIUM SERPL-SCNC: 138 MMOL/L (ref 136–145)
TROPONIN T SERPL HS-MCNC: 16 NG/L
WBC NRBC COR # BLD AUTO: 6.28 10*3/MM3 (ref 3.4–10.8)

## 2024-07-24 PROCEDURE — G0378 HOSPITAL OBSERVATION PER HR: HCPCS

## 2024-07-24 PROCEDURE — 85025 COMPLETE CBC W/AUTO DIFF WBC: CPT | Performed by: EMERGENCY MEDICINE

## 2024-07-24 PROCEDURE — 71045 X-RAY EXAM CHEST 1 VIEW: CPT

## 2024-07-24 PROCEDURE — 93010 ELECTROCARDIOGRAM REPORT: CPT | Performed by: INTERNAL MEDICINE

## 2024-07-24 PROCEDURE — 25010000002 ENOXAPARIN PER 10 MG: Performed by: STUDENT IN AN ORGANIZED HEALTH CARE EDUCATION/TRAINING PROGRAM

## 2024-07-24 PROCEDURE — 25810000003 LACTATED RINGERS SOLUTION: Performed by: EMERGENCY MEDICINE

## 2024-07-24 PROCEDURE — 84484 ASSAY OF TROPONIN QUANT: CPT | Performed by: EMERGENCY MEDICINE

## 2024-07-24 PROCEDURE — 0202U NFCT DS 22 TRGT SARS-COV-2: CPT | Performed by: EMERGENCY MEDICINE

## 2024-07-24 PROCEDURE — 36415 COLL VENOUS BLD VENIPUNCTURE: CPT

## 2024-07-24 PROCEDURE — 25010000002 FUROSEMIDE PER 20 MG: Performed by: EMERGENCY MEDICINE

## 2024-07-24 PROCEDURE — 82948 REAGENT STRIP/BLOOD GLUCOSE: CPT

## 2024-07-24 PROCEDURE — 93005 ELECTROCARDIOGRAM TRACING: CPT | Performed by: EMERGENCY MEDICINE

## 2024-07-24 PROCEDURE — 83880 ASSAY OF NATRIURETIC PEPTIDE: CPT | Performed by: EMERGENCY MEDICINE

## 2024-07-24 PROCEDURE — 80053 COMPREHEN METABOLIC PANEL: CPT | Performed by: EMERGENCY MEDICINE

## 2024-07-24 PROCEDURE — 99285 EMERGENCY DEPT VISIT HI MDM: CPT

## 2024-07-24 RX ORDER — FUROSEMIDE 10 MG/ML
80 INJECTION INTRAMUSCULAR; INTRAVENOUS ONCE
Status: COMPLETED | OUTPATIENT
Start: 2024-07-24 | End: 2024-07-24

## 2024-07-24 RX ORDER — OMEPRAZOLE
40 KIT EVERY MORNING
COMMUNITY

## 2024-07-24 RX ORDER — IPRATROPIUM BROMIDE AND ALBUTEROL SULFATE 2.5; .5 MG/3ML; MG/3ML
3 SOLUTION RESPIRATORY (INHALATION) EVERY 4 HOURS PRN
Status: DISCONTINUED | OUTPATIENT
Start: 2024-07-24 | End: 2024-07-26 | Stop reason: HOSPADM

## 2024-07-24 RX ORDER — SODIUM CHLORIDE 9 MG/ML
40 INJECTION, SOLUTION INTRAVENOUS AS NEEDED
Status: DISCONTINUED | OUTPATIENT
Start: 2024-07-24 | End: 2024-07-26 | Stop reason: HOSPADM

## 2024-07-24 RX ORDER — GLYCOPYRROLATE 1 MG/1
1 TABLET ORAL 2 TIMES DAILY
Status: DISCONTINUED | OUTPATIENT
Start: 2024-07-24 | End: 2024-07-26 | Stop reason: HOSPADM

## 2024-07-24 RX ORDER — ASPIRIN 81 MG/1
81 TABLET, CHEWABLE ORAL DAILY
Status: DISCONTINUED | OUTPATIENT
Start: 2024-07-24 | End: 2024-07-26 | Stop reason: HOSPADM

## 2024-07-24 RX ORDER — ENOXAPARIN SODIUM 100 MG/ML
40 INJECTION SUBCUTANEOUS EVERY 24 HOURS
Status: DISCONTINUED | OUTPATIENT
Start: 2024-07-24 | End: 2024-07-26 | Stop reason: HOSPADM

## 2024-07-24 RX ORDER — TAMSULOSIN HYDROCHLORIDE 0.4 MG/1
0.4 CAPSULE ORAL DAILY
Status: DISCONTINUED | OUTPATIENT
Start: 2024-07-24 | End: 2024-07-26 | Stop reason: HOSPADM

## 2024-07-24 RX ORDER — DILTIAZEM HYDROCHLORIDE 5 MG/ML
10 INJECTION INTRAVENOUS ONCE
Status: DISCONTINUED | OUTPATIENT
Start: 2024-07-24 | End: 2024-07-24

## 2024-07-24 RX ORDER — BISACODYL 10 MG
10 SUPPOSITORY, RECTAL RECTAL DAILY PRN
Status: DISCONTINUED | OUTPATIENT
Start: 2024-07-24 | End: 2024-07-26 | Stop reason: HOSPADM

## 2024-07-24 RX ORDER — POLYETHYLENE GLYCOL 3350 17 G/17G
17 POWDER, FOR SOLUTION ORAL DAILY PRN
Status: DISCONTINUED | OUTPATIENT
Start: 2024-07-24 | End: 2024-07-26 | Stop reason: HOSPADM

## 2024-07-24 RX ORDER — AMOXICILLIN 250 MG
2 CAPSULE ORAL 2 TIMES DAILY PRN
Status: DISCONTINUED | OUTPATIENT
Start: 2024-07-24 | End: 2024-07-26 | Stop reason: HOSPADM

## 2024-07-24 RX ORDER — SODIUM CHLORIDE 0.9 % (FLUSH) 0.9 %
10 SYRINGE (ML) INJECTION EVERY 12 HOURS SCHEDULED
Status: DISCONTINUED | OUTPATIENT
Start: 2024-07-24 | End: 2024-07-26 | Stop reason: HOSPADM

## 2024-07-24 RX ORDER — SODIUM CHLORIDE 0.9 % (FLUSH) 0.9 %
10 SYRINGE (ML) INJECTION AS NEEDED
Status: DISCONTINUED | OUTPATIENT
Start: 2024-07-24 | End: 2024-07-26 | Stop reason: HOSPADM

## 2024-07-24 RX ORDER — BISACODYL 5 MG/1
5 TABLET, DELAYED RELEASE ORAL DAILY PRN
Status: DISCONTINUED | OUTPATIENT
Start: 2024-07-24 | End: 2024-07-26 | Stop reason: HOSPADM

## 2024-07-24 RX ADMIN — ASPIRIN 81 MG: 81 TABLET, CHEWABLE ORAL at 18:27

## 2024-07-24 RX ADMIN — ENOXAPARIN SODIUM 40 MG: 100 INJECTION SUBCUTANEOUS at 18:27

## 2024-07-24 RX ADMIN — SODIUM CHLORIDE, POTASSIUM CHLORIDE, SODIUM LACTATE AND CALCIUM CHLORIDE 500 ML: 600; 310; 30; 20 INJECTION, SOLUTION INTRAVENOUS at 11:01

## 2024-07-24 RX ADMIN — FUROSEMIDE 80 MG: 10 INJECTION, SOLUTION INTRAMUSCULAR; INTRAVENOUS at 13:23

## 2024-07-24 RX ADMIN — Medication 10 ML: at 18:32

## 2024-07-24 RX ADMIN — TAMSULOSIN HYDROCHLORIDE 0.4 MG: 0.4 CAPSULE ORAL at 18:27

## 2024-07-24 RX ADMIN — GLYCOPYRROLATE 1 MG: 1 TABLET ORAL at 21:29

## 2024-07-24 RX ADMIN — Medication 10 ML: at 21:29

## 2024-07-24 NOTE — H&P
Patient Name:  Aquiles Bahena  YOB: 1955  MRN:  [id number]  Admit Date:  7/24/2024  Patient Care Team:  Lali Fink MD as PCP - General (Family Medicine)      Subjective   History Present Illness     Chief Complaint   Patient presents with    Rapid Heart Rate   This is a 69-year-old male with a past medical history of hypertension, coronary artery disease, history of anoxic brain injury with chronic aphasia and dysphagia status post G-tube placement who presented to hospital from his nursing facility with shortness of air and rapid heart rate.      Personal History     Past Medical History:   Diagnosis Date    Anoxic brain damage     Anxiety     Arthritis     BPH (benign prostatic hyperplasia)     Coronary artery disease     COVID-19     Dementia     Dysphagia     GERD (gastroesophageal reflux disease)     Hyperlipidemia     Hypotension     Smoking      Past Surgical History:   Procedure Laterality Date    GTUBE INSERTION       No family history on file.  Social History     Tobacco Use    Smoking status: Former     Types: Cigarettes     Passive exposure: Past   Vaping Use    Vaping status: Unknown   Substance Use Topics    Alcohol use: Defer    Drug use: Defer     No current facility-administered medications on file prior to encounter.     Current Outpatient Medications on File Prior to Encounter   Medication Sig Dispense Refill    acetaminophen (TYLENOL) 500 MG tablet Administer 1 tablet per G tube Every 4 (Four) Hours As Needed for Mild Pain.      aspirin 81 MG chewable tablet Administer 1 tablet per G tube Daily.      Dimethicone 1.5 % cream Apply 1 Application topically to the appropriate area as directed 2 (Two) Times a Day.      glycopyrrolate (ROBINUL) 1 MG tablet Administer 1 tablet per G tube 2 (Two) Times a Day.      ipratropium-albuterol (DUO-NEB) 0.5-2.5 mg/3 ml nebulizer Take 3 mL by nebulization Every 4 (Four) Hours As Needed for Wheezing or Shortness of Air. 360 mL      omeprazole (FIRST) 2 MG/ML suspension oral suspension Administer 20 mL per G tube Every Morning.      polyethylene glycol (MIRALAX) 17 GM/SCOOP powder Take 17 g by mouth Daily. (Patient taking differently: Administer 17 g per G tube Daily.) 510 g 0    polyvinyl alcohol (LIQUIFILM) 1.4 % ophthalmic solution Administer 1 drop to both eyes Every 4 (Four) Hours As Needed for Dry Eyes.      tamsulosin (FLOMAX) 0.4 MG capsule 24 hr capsule 1 capsule Daily. Via g-tube      donepezil (ARICEPT) 10 MG tablet Administer 1 tablet per G tube Every Night.      loperamide (IMODIUM) 2 MG capsule Administer 1 capsule per G tube Every 6 (Six) Hours As Needed for Diarrhea.      omeprazole (priLOSEC) 40 MG capsule Administer 1 capsule per G tube Daily.      simvastatin (ZOCOR) 40 MG tablet Administer 1 tablet per G tube Every Night.      traZODone (DESYREL) 50 MG tablet Administer 1 tablet per G tube Every Night.       Allergies   Allergen Reactions    Bupropion Other (See Comments)       Objective    Objective     Vital Signs  Temp:  [97.6 °F (36.4 °C)] 97.6 °F (36.4 °C)  Heart Rate:  [] 94  Resp:  [20] 20  BP: (126-144)/() 141/99  SpO2:  [88 %-97 %] 97 %  on   ;   Device (Oxygen Therapy): room air  There is no height or weight on file to calculate BMI.    Physical Exam  Constitutional:       General: He is not in acute distress.     Appearance: He is ill-appearing.   HENT:      Head: Normocephalic and atraumatic.   Cardiovascular:      Rate and Rhythm: Normal rate and regular rhythm.   Pulmonary:      Effort: Pulmonary effort is normal. No respiratory distress.   Abdominal:      General: There is no distension.      Palpations: Abdomen is soft.      Tenderness: There is no abdominal tenderness.      Comments: PEG noted    Neurological:      Mental Status: He is alert and oriented to person, place, and time.      Motor: Weakness present.         Results Review:  I reviewed the patient's new clinical results.  I reviewed  the patient's new imaging results and agree with the interpretation.  I reviewed the patient's other test results and agree with the interpretation  I personally viewed and interpreted the patient's EKG/Telemetry data  Discussed with ED provider.    Lab Results (last 24 hours)       Procedure Component Value Units Date/Time    Respiratory Panel PCR w/COVID-19(SARS-CoV-2) MADAN/ANITA/ZULMA/PAD/COR/BREANN In-House, NP Swab in UTM/VTM, 2 HR TAT - Swab, Nasopharynx [[id number]]  (Normal) Collected: 07/24/24 1040    Specimen: Swab from Nasopharynx Updated: 07/24/24 1247     ADENOVIRUS, PCR Not Detected     Coronavirus 229E Not Detected     Coronavirus HKU1 Not Detected     Coronavirus NL63 Not Detected     Coronavirus OC43 Not Detected     COVID19 Not Detected     Human Metapneumovirus Not Detected     Human Rhinovirus/Enterovirus Not Detected     Influenza A PCR Not Detected     Influenza B PCR Not Detected     Parainfluenza Virus 1 Not Detected     Parainfluenza Virus 2 Not Detected     Parainfluenza Virus 3 Not Detected     Parainfluenza Virus 4 Not Detected     RSV, PCR Not Detected     Bordetella pertussis pcr Not Detected     Bordetella parapertussis PCR Not Detected     Chlamydophila pneumoniae PCR Not Detected     Mycoplasma pneumo by PCR Not Detected    Narrative:      In the setting of a positive respiratory panel with a viral infection PLUS a negative procalcitonin without other underlying concern for bacterial infection, consider observing off antibiotics or discontinuation of antibiotics and continue supportive care. If the respiratory panel is positive for atypical bacterial infection (Bordetella pertussis, Chlamydophila pneumoniae, or Mycoplasma pneumoniae), consider antibiotic de-escalation to target atypical bacterial infection.    CBC & Differential [[id number]]  (Abnormal) Collected: 07/24/24 1049    Specimen: Blood Updated: 07/24/24 1102    Narrative:      The following orders were created for panel order CBC &  Differential.  Procedure                               Abnormality         Status                     ---------                               -----------         ------                     CBC Auto Differential[[id number]]        Abnormal            Final result                 Please view results for these tests on the individual orders.    Comprehensive Metabolic Panel [[id number]]  (Abnormal) Collected: 07/24/24 1049    Specimen: Blood Updated: 07/24/24 1122     Glucose 163 mg/dL      BUN 10 mg/dL      Creatinine 0.68 mg/dL      Sodium 138 mmol/L      Potassium 4.1 mmol/L      Chloride 106 mmol/L      CO2 21.7 mmol/L      Calcium 8.3 mg/dL      Total Protein 7.4 g/dL      Albumin 3.6 g/dL      ALT (SGPT) 22 U/L      AST (SGOT) 18 U/L      Alkaline Phosphatase 138 U/L      Total Bilirubin 0.5 mg/dL      Globulin 3.8 gm/dL      A/G Ratio 0.9 g/dL      BUN/Creatinine Ratio 14.7     Anion Gap 10.3 mmol/L      eGFR 100.6 mL/min/1.73     Narrative:      GFR Normal >60  Chronic Kidney Disease <60  Kidney Failure <15      BNP [[id number]]  (Abnormal) Collected: 07/24/24 1049    Specimen: Blood Updated: 07/24/24 1122     proBNP 3,399.0 pg/mL     Narrative:      This assay is used as an aid in the diagnosis of individuals suspected of having heart failure. It can be used as an aid in the diagnosis of acute decompensated heart failure (ADHF) in patients presenting with signs and symptoms of ADHF to the emergency department (ED). In addition, NT-proBNP of <300 pg/mL indicates ADHF is not likely.    Age Range Result Interpretation  NT-proBNP Concentration (pg/mL:      <50             Positive            >450                   Gray                 300-450                    Negative             <300    50-75           Positive            >900                  Gray                300-900                  Negative            <300      >75             Positive            >1800                  Gray                300-1800                   Negative            <300    Single High Sensitivity Troponin T [[id number]]  (Normal) Collected: 07/24/24 1049    Specimen: Blood Updated: 07/24/24 1122     HS Troponin T 16 ng/L     Narrative:      High Sensitive Troponin T Reference Range:  <14.0 ng/L- Negative Female for AMI  <22.0 ng/L- Negative Male for AMI  >=14 - Abnormal Female indicating possible myocardial injury.  >=22 - Abnormal Male indicating possible myocardial injury.   Clinicians would have to utilize clinical acumen, EKG, Troponin, and serial changes to determine if it is an Acute Myocardial Infarction or myocardial injury due to an underlying chronic condition.         CBC Auto Differential [[id number]]  (Abnormal) Collected: 07/24/24 1049    Specimen: Blood Updated: 07/24/24 1102     WBC 6.28 10*3/mm3      RBC 5.48 10*6/mm3      Hemoglobin 15.5 g/dL      Hematocrit 48.3 %      MCV 88.1 fL      MCH 28.3 pg      MCHC 32.1 g/dL      RDW 14.0 %      RDW-SD 44.8 fl      MPV 11.6 fL      Platelets 203 10*3/mm3      Neutrophil % 76.4 %      Lymphocyte % 12.6 %      Monocyte % 7.6 %      Eosinophil % 2.5 %      Basophil % 0.6 %      Immature Grans % 0.3 %      Neutrophils, Absolute 4.79 10*3/mm3      Lymphocytes, Absolute 0.79 10*3/mm3      Monocytes, Absolute 0.48 10*3/mm3      Eosinophils, Absolute 0.16 10*3/mm3      Basophils, Absolute 0.04 10*3/mm3      Immature Grans, Absolute 0.02 10*3/mm3      nRBC 0.0 /100 WBC             Results for orders placed or performed during the hospital encounter of 06/11/23   Blood Culture - Blood, Arm, Left    Specimen: Arm, Left; Blood   Result Value Ref Range    Blood Culture No growth at 5 days        Imaging Results (Last 24 Hours)       Procedure Component Value Units Date/Time    XR Chest 1 View [[id number]] Collected: 07/24/24 1055     Updated: 07/24/24 1058    Narrative:      XR CHEST 1 VW-7/24/2024     HISTORY: Shortness of breath.     Heart size is mildly enlarged. Lungs are underinflated with  some  vascular crowding. No focal infiltrates are seen. Bones and soft tissues  are unremarkable.       Impression:      1. Mild cardiomegaly.        This report was finalized on 7/24/2024 10:55 AM by Dr. Ameya Hylton M.D on Workstation: [id number]                   ECG 12 Lead Tachycardia   Preliminary Result   HEART INIG=910  bpm   RR Wwrnwfkm=825  ms   ND Qmtbvtgb=049  ms   P Horizontal Axis=42  deg   P Front Axis=76  deg   QRSD Dqxabfij=412  ms   QT Mdytwhrr=701  ms   DZbW=737  ms   QRS Axis=-51  deg   T Wave Axis=74  deg   - ABNORMAL ECG -   Incomplete analysis due to missing data in precordial lead(s)   Sinus tachycardia   Right bundle branch block   Anteroseptal infarct, age indeterminate   Missing lead(s): V6   Date and Time of Study:2024-07-24 10:48:36                 Assessment/Plan     Active Hospital Problems    Diagnosis  POA    **Acute CHF [I50.9]  Yes    HTN (hypertension) [I10]  Yes    CAD (coronary artery disease) [I25.10]  Yes    GERD (gastroesophageal reflux disease) [K21.9]  Yes    Anoxic brain injury [G93.1]  Yes    Dysphagia [R13.10]  Yes      Resolved Hospital Problems   No resolved problems to display.       Congestive heart Failure   proBNP elevated.  Chest x-ray with mild cardiomegaly. Rvp negative. Afebrile   Obtain echocardiogram.  Strict Is/Os  S/p IV lasix 80mg x 1    Supraventricular tachycardia   Patient's rate spontaneously improved with some IV fluids.    Coronary artery disease  GERD  HTN  Resume home medications     Dysphagia  Anoxic brain injury  Immobility syndrome        I discussed the patient's findings and my recommendations with patient, family, and ED provider.    VTE Prophylaxis - SCDs.  Code Status - Full code.       Og Regalado MD  [facility]  07/24/24  13:49 EDT

## 2024-07-24 NOTE — PROGRESS NOTES
Clinical Pharmacy Services: Medication History    Aquiles Bahena is a 69 y.o. male presenting to [facility] for   Chief Complaint   Patient presents with    Rapid Heart Rate       He  has a past medical history of Anoxic brain damage, Anxiety, Arthritis, BPH (benign prostatic hyperplasia), Coronary artery disease, COVID-19, Dementia, Dysphagia, GERD (gastroesophageal reflux disease), Hyperlipidemia, Hypotension, and Smoking.    Allergies as of 07/24/2024 - Reviewed 07/24/2024   Allergen Reaction Noted    Bupropion Other (See Comments) 05/20/2022       Medication information was obtained from: [facility] Paperwork   Pharmacy and Phone Number:     Prior to Admission Medications       Prescriptions Last Dose Informant Patient Reported? Taking?    acetaminophen (TYLENOL) 500 MG tablet  Nursing Home Yes Yes    Administer 1 tablet per G tube Every 4 (Four) Hours As Needed for Mild Pain.    aspirin 81 MG chewable tablet  Nursing Home Yes Yes    Administer 1 tablet per G tube Daily.    Dimethicone 1.5 % cream  Nursing Home Yes Yes    Apply 1 Application topically to the appropriate area as directed 2 (Two) Times a Day.    glycopyrrolate (ROBINUL) 1 MG tablet  Nursing Home Yes Yes    Administer 1 tablet per G tube 2 (Two) Times a Day.    ipratropium-albuterol (DUO-NEB) 0.5-2.5 mg/3 ml nebulizer  Nursing Home No Yes    Take 3 mL by nebulization Every 4 (Four) Hours As Needed for Wheezing or Shortness of Air.    omeprazole (FIRST) 2 MG/ML suspension oral suspension  Nursing Home Yes Yes    Administer 20 mL per G tube Every Morning.    polyethylene glycol (MIRALAX) 17 GM/SCOOP powder  Nursing Home No Yes    Take 17 g by mouth Daily.    Patient taking differently:  Administer 17 g per G tube Daily.    polyvinyl alcohol (LIQUIFILM) 1.4 % ophthalmic solution  Nursing Home Yes Yes    Administer 1 drop to both eyes Every 4 (Four) Hours As Needed for Dry Eyes.    tamsulosin (FLOMAX) 0.4 MG capsule 24 hr capsule  Nursing  Home Yes Yes    1 capsule Daily. Via g-tube    donepezil (ARICEPT) 10 MG tablet   Yes No    Administer 1 tablet per G tube Every Night.    loperamide (IMODIUM) 2 MG capsule   Yes No    Administer 1 capsule per G tube Every 6 (Six) Hours As Needed for Diarrhea.    omeprazole (priLOSEC) 40 MG capsule   Yes No    Administer 1 capsule per G tube Daily.    simvastatin (ZOCOR) 40 MG tablet   Yes No    Administer 1 tablet per G tube Every Night.    traZODone (DESYREL) 50 MG tablet   Yes No    Administer 1 tablet per G tube Every Night.              Medication notes:     This medication list is complete to the best of my knowledge as of 7/24/2024    Please call if questions.    Austin Montoya  Medication History Technician   [phone]    7/24/2024 13:01 EDT

## 2024-07-24 NOTE — PLAN OF CARE
Problem: Aspiration (Enteral Nutrition)  Goal: Absence of Aspiration Signs and Symptoms  Outcome: Ongoing, Progressing     Problem: Device-Related Complication Risk (Enteral Nutrition)  Goal: Safe, Effective Therapy Delivery  Outcome: Ongoing, Progressing     Problem: Feeding Intolerance (Enteral Nutrition)  Goal: Feeding Tolerance  Outcome: Ongoing, Progressing   Goal Outcome Evaluation:      RD to follow

## 2024-07-24 NOTE — CONSULTS
Nutrition Services    Patient Name:  Aquiles Bahena  YOB: 1955  MRN: [id number]  Admit Date:  7/24/2024    Assessment Date:  07/24/24    Summary: Consult for TF assessment.   Pt is a 70 y/o male who presented with periodic breathing and tachycardia. Pt has a hx of anoxic brain injury, dementia, GERD, CAD, PEG, HTN, HLD.  Pt alert and able to shake his head yes and no but not able to answer questions in any detail. RD unsure how oriented pt is as he denied having a PEG tube. Pt did deny any n/v or abdominal pain. RN reported pt daughter was unsure of his home tube feeding regimen. RD unable to assess weight hx due to limited weights in EMR. Pt diuresed with one dose of 80 mg IV lasix per [facility] note.     RECS:  Initiate Nutren 1.5 at 10 ml/hr and advance 10 ml q 4 hrs to goal rate of 45 ml/hr. Goal rate to provide 1620 kcals, 73 g/protein, and 825 ml free water. Flush 30 ml free water q 4 hrs.     RD to follow    CLINICAL NUTRITION ASSESSMENT      Reason for Assessment Physician Consult, TF Assessment     Diagnosis/Problem   periodic breathing and tachycardia. Pt has a hx of anoxic brain injury, dementia, GERD, CAD, PEG, HTN, HLD.   Medical/Surgical History Past Medical History:   Diagnosis Date    Anoxic brain damage     Anxiety     Arthritis     BPH (benign prostatic hyperplasia)     Coronary artery disease     COVID-19     Dementia     Dysphagia     GERD (gastroesophageal reflux disease)     Hyperlipidemia     Hypotension     Smoking        Past Surgical History:   Procedure Laterality Date    GTUBE INSERTION          Anthropometrics        Current Height  Current Weight  BMI kg/m2       Body mass index is 24.93 kg/m² (pended).   Adjusted BMI (if applicable)    BMI Category Normal/Healthy (18.4 - 24.9)   Ideal Body Weight (IBW) 141 lbs   Usual Body Weight (UBW) Unknown   Weight Trend Unknown   Weight History Wt Readings from Last 30 Encounters:   07/24/24 1500 (P) 73.7 kg (162 lb 9.3 oz)   06/15/23 0517  "71.3 kg (157 lb 3 oz)   06/14/23 0517 76.7 kg (169 lb 1.5 oz)   06/13/23 0500 74.3 kg (163 lb 12.8 oz)   06/12/23 0544 73.5 kg (162 lb 0.6 oz)   06/11/23 1310 77.6 kg (171 lb)   05/16/23 0825 77.9 kg (171 lb 11.2 oz)   05/16/23 0204 109 kg (240 lb)        Estimated/Assessed Needs        Energy Requirements    Weight for Calculation 74 kg   Method for Estimation  kcal/kg, other:20-25   EST Needs (kcal/day) 2393-9921       Protein Requirements    Weight for Calculation 74 kg   EST Protein Needs (g/kg) 1.2 - 1.5 gm/kg   EST Daily Needs (g/day)        Fluid Requirements     Method for Estimation 1 mL/kcal    Estimated Needs (mL/day)        Fluid Deficit    Current Na Level (mEq/L)    Desired Na Level (mEq/L)    Estimated Fluid Deficit (L)       Labs       Pertinent Labs    Results from last 7 days   Lab Units 07/24/24  1049   SODIUM mmol/L 138   POTASSIUM mmol/L 4.1   CHLORIDE mmol/L 106   CO2 mmol/L 21.7*   BUN mg/dL 10   CREATININE mg/dL 0.68*   CALCIUM mg/dL 8.3*   BILIRUBIN mg/dL 0.5   ALK PHOS U/L 138*   ALT (SGPT) U/L 22   AST (SGOT) U/L 18   GLUCOSE mg/dL 163*     Results from last 7 days   Lab Units 07/24/24  1049   HEMOGLOBIN g/dL 15.5   HEMATOCRIT % 48.3   WBC 10*3/mm3 6.28   ALBUMIN g/dL 3.6     Results from last 7 days   Lab Units 07/24/24  1049   PLATELETS 10*3/mm3 203     COVID19   Date Value Ref Range Status   07/24/2024 Not Detected Not Detected - Ref. Range Final     No results found for: \"HGBA1C\"       Medications           Scheduled Medications aspirin, 81 mg, Per G Tube, Daily  enoxaparin, 40 mg, Subcutaneous, Q24H  glycopyrrolate, 1 mg, Per G Tube, BID  sodium chloride, 10 mL, Intravenous, Q12H  tamsulosin, 0.4 mg, Oral, Daily       Infusions     PRN Medications   senna-docusate sodium **AND** polyethylene glycol **AND** bisacodyl **AND** bisacodyl    Calcium Replacement - Follow Nurse / BPA Driven Protocol    ipratropium-albuterol    Magnesium Standard Dose Replacement - Follow Nurse / BPA " Driven Protocol    Phosphorus Replacement - Follow Nurse / BPA Driven Protocol    Potassium Replacement - Follow Nurse / BPA Driven Protocol    sodium chloride    sodium chloride     Physical Findings          General Findings alert, aphasic, disoriented   Oral/Mouth Cavity WDL   Edema  no edema   Gastrointestinal WDL   Skin  skin intact   Tubes/Drains/Lines PEG   NFPE Not indicated at this time   --  Current Nutrition Orders & Evaluation of Intake       Oral Nutrition     Food Allergies NKFA   Current PO Diet NPO Diet NPO Type: Strict NPO   Supplement n/a   PO Evaluation     % PO Intake NPO    Factors Affecting Intake: swallow impairment     PES STATEMENT / NUTRITION DIAGNOSIS      Nutrition Dx Problem  Problem: Needs Alternative Composition  Etiology: Medical Diagnosis - periodic breathing and tachycardia. Pt has a hx of anoxic brain injury, dementia, GERD, CAD, PEG, HTN, HLD.    Signs/Symptoms: Report/Observation   --  NUTRITION INTERVENTION / PLAN OF CARE      Intervention Goal(s) Maintain nutrition status, Establish goals of care, Meet estimated needs, Initiate feeding/diet, Initiate TF/PN, Tolerate TF/PN at goal, Maintain weight, and No significant weight loss         RD Intervention/Action Await initiation of EN/PN, Continue to monitor, Care plan reviewed, and Recommend/order: EN         Prescription/Orders:       PO Diet       Supplements       Enteral Nutrition    Enteral Prescription:     Enteral Route PEG    TF Delivery Method Continuous    Enteral Product Nutren 1.5    Modular None    Propofol Rate/Kcal     TF Start Rate  10 mL/hr    TF Goal Rate  45 mL/hr    Free Water Flush 30 mL Q 4 hr    Provision at Goal:          Calories 1620 kcal, meets 100% needs         Protein  73 gm protein, meets 83% needs         Fluid (mL) 825 mL free water + 180 mL in flushes         Parenteral Nutrition    New Prescription Ordered? Yes   --      Monitor/Evaluation Per protocol, EN delivery/tolerance, Weight, Skin status,  GI status, Symptoms, POC/GOC, Swallow function   Discharge Plan/Needs No discharge needs identified at this time   --    RD to follow per protocol.      Electronically signed by:  Tyrone Julio  07/24/24 15:36 EDT

## 2024-07-24 NOTE — ED PROVIDER NOTES
EMERGENCY DEPARTMENT ENCOUNTER    Room Number:  11/11  PCP: Lali Fink MD  Historian: EMS      HPI:  Chief Complaint: Periodic breathing and tachycardia  A complete HPI/ROS/PMH/PSH/SH/FH are unobtainable due to: Somewhat limited for anoxic brain injury  Context: Aquiles Bahena is a 69 y.o. male who presents to the ED c/o.  Periodic breathing and tachycardia.  Patient has history of anoxic brain injury.  Patient has been seen here for aspiration pneumonia.  Has also had SVT in the past.  Patient transported today for periodic breathing.  Patient has no complaints.  No cough or congestion.  No vomiting or diarrhea.  Patient was noted to be tachycardic.  Of note patient was here a few months ago for feeding tube dislodged and had tachycardia as well.  He was given diltiazem at that point with improvement            PAST MEDICAL HISTORY  Active Ambulatory Problems     Diagnosis Date Noted    Anoxic brain injury 05/16/2023    Dementia 05/16/2023    Dysphagia 05/16/2023    GERD (gastroesophageal reflux disease) 05/16/2023    CAD (coronary artery disease) 05/16/2023    Left lower lobe pneumonia 05/16/2023    Gastrostomy tube dysfunction 05/16/2023    BPH (benign prostatic hyperplasia) 05/16/2023    HTN (hypertension) 05/16/2023    HLD (hyperlipidemia) 05/16/2023    Pneumonia of left lower lobe due to infectious organism 05/17/2023     Resolved Ambulatory Problems     Diagnosis Date Noted    Aspiration pneumonia of left lower lobe, unspecified aspiration pneumonia type 06/11/2023    Hypoxia 06/12/2023     Past Medical History:   Diagnosis Date    Anoxic brain damage     Anxiety     Arthritis     Coronary artery disease     COVID-19     Hyperlipidemia     Hypotension     Smoking          PAST SURGICAL HISTORY  Past Surgical History:   Procedure Laterality Date    GTUBE INSERTION           FAMILY HISTORY  No family history on file.      SOCIAL HISTORY  Social History     Socioeconomic History    Marital status:     Tobacco Use    Smoking status: Former     Types: Cigarettes     Passive exposure: Past   Vaping Use    Vaping status: Unknown   Substance and Sexual Activity    Alcohol use: Defer    Drug use: Defer    Sexual activity: Defer         ALLERGIES  Bupropion        REVIEW OF SYSTEMS  Review of Systems   Tachycardia      PHYSICAL EXAM  ED Triage Vitals   Temp Pulse Resp BP SpO2   -- -- -- -- --      Temp src Heart Rate Source Patient Position BP Location FiO2 (%)   -- -- -- -- --       Physical Exam      GENERAL: no acute distress.  Patient is tachypneic  HENT: nares patent  EYES: no scleral icterus  CV: regular rhythm, tachycardic  RESPIRATORY: normal effort  ABDOMEN: soft  MUSCULOSKELETAL: no deformity  NEURO: alert, moves all extremities, follows commands.  Answers most questions  PSYCH:  calm, cooperative  SKIN: warm, dry    Vital signs and nursing notes reviewed.          LAB RESULTS  Recent Results (from the past 24 hour(s))   Respiratory Panel PCR w/COVID-19(SARS-CoV-2) [facility] In-House, NP Swab in UTM/VTM, 2 HR TAT - Swab, Nasopharynx    Collection Time: 07/24/24 10:40 AM    Specimen: Nasopharynx; Swab   Result Value Ref Range    ADENOVIRUS, PCR Not Detected Not Detected    Coronavirus 229E Not Detected Not Detected    Coronavirus HKU1 Not Detected Not Detected    Coronavirus NL63 Not Detected Not Detected    Coronavirus OC43 Not Detected Not Detected    COVID19 Not Detected Not Detected - Ref. Range    Human Metapneumovirus Not Detected Not Detected    Human Rhinovirus/Enterovirus Not Detected Not Detected    Influenza A PCR Not Detected Not Detected    Influenza B PCR Not Detected Not Detected    Parainfluenza Virus 1 Not Detected Not Detected    Parainfluenza Virus 2 Not Detected Not Detected    Parainfluenza Virus 3 Not Detected Not Detected    Parainfluenza Virus 4 Not Detected Not Detected    RSV, PCR Not Detected Not Detected    Bordetella pertussis pcr Not Detected Not Detected     Bordetella parapertussis PCR Not Detected Not Detected    Chlamydophila pneumoniae PCR Not Detected Not Detected    Mycoplasma pneumo by PCR Not Detected Not Detected   ECG 12 Lead Tachycardia    Collection Time: 07/24/24 10:48 AM   Result Value Ref Range    QT Interval 384 ms    QTC Interval 518 ms   Comprehensive Metabolic Panel    Collection Time: 07/24/24 10:49 AM    Specimen: Blood   Result Value Ref Range    Glucose 163 (H) 65 - 99 mg/dL    BUN 10 8 - 23 mg/dL    Creatinine 0.68 (L) 0.76 - 1.27 mg/dL    Sodium 138 136 - 145 mmol/L    Potassium 4.1 3.5 - 5.2 mmol/L    Chloride 106 98 - 107 mmol/L    CO2 21.7 (L) 22.0 - 29.0 mmol/L    Calcium 8.3 (L) 8.6 - 10.5 mg/dL    Total Protein 7.4 6.0 - 8.5 g/dL    Albumin 3.6 3.5 - 5.2 g/dL    ALT (SGPT) 22 1 - 41 U/L    AST (SGOT) 18 1 - 40 U/L    Alkaline Phosphatase 138 (H) 39 - 117 U/L    Total Bilirubin 0.5 0.0 - 1.2 mg/dL    Globulin 3.8 gm/dL    A/G Ratio 0.9 g/dL    BUN/Creatinine Ratio 14.7 7.0 - 25.0    Anion Gap 10.3 5.0 - 15.0 mmol/L    eGFR 100.6 >60.0 mL/min/1.73   BNP    Collection Time: 07/24/24 10:49 AM    Specimen: Blood   Result Value Ref Range    proBNP 3,399.0 (H) 0.0 - 900.0 pg/mL   Single High Sensitivity Troponin T    Collection Time: 07/24/24 10:49 AM    Specimen: Blood   Result Value Ref Range    HS Troponin T 16 <22 ng/L   CBC Auto Differential    Collection Time: 07/24/24 10:49 AM    Specimen: Blood   Result Value Ref Range    WBC 6.28 3.40 - 10.80 10*3/mm3    RBC 5.48 4.14 - 5.80 10*6/mm3    Hemoglobin 15.5 13.0 - 17.7 g/dL    Hematocrit 48.3 37.5 - 51.0 %    MCV 88.1 79.0 - 97.0 fL    MCH 28.3 26.6 - 33.0 pg    MCHC 32.1 31.5 - 35.7 g/dL    RDW 14.0 12.3 - 15.4 %    RDW-SD 44.8 37.0 - 54.0 fl    MPV 11.6 6.0 - 12.0 fL    Platelets 203 140 - 450 10*3/mm3    Neutrophil % 76.4 (H) 42.7 - 76.0 %    Lymphocyte % 12.6 (L) 19.6 - 45.3 %    Monocyte % 7.6 5.0 - 12.0 %    Eosinophil % 2.5 0.3 - 6.2 %    Basophil % 0.6 0.0 - 1.5 %    Immature Grans %  0.3 0.0 - 0.5 %    Neutrophils, Absolute 4.79 1.70 - 7.00 10*3/mm3    Lymphocytes, Absolute 0.79 0.70 - 3.10 10*3/mm3    Monocytes, Absolute 0.48 0.10 - 0.90 10*3/mm3    Eosinophils, Absolute 0.16 0.00 - 0.40 10*3/mm3    Basophils, Absolute 0.04 0.00 - 0.20 10*3/mm3    Immature Grans, Absolute 0.02 0.00 - 0.05 10*3/mm3    nRBC 0.0 0.0 - 0.2 /100 WBC       Ordered the above labs and reviewed the results.        RADIOLOGY  XR Chest 1 View    Result Date: 7/24/2024  XR CHEST 1 VW-7/24/2024  HISTORY: Shortness of breath.  Heart size is mildly enlarged. Lungs are underinflated with some vascular crowding. No focal infiltrates are seen. Bones and soft tissues are unremarkable.      1. Mild cardiomegaly.   This report was finalized on 7/24/2024 10:55 AM by Dr. Ameya Hylton M.D on Workstation: [id number]       Ordered the above noted radiological studies.  Chest x-ray independently interpreted by me and does not show any evidence of pneumonia        PROCEDURES  Procedures      EKG          EKG time: 1048  Rhythm/Rate: Sinus tachycardia 109  P waves and [place]: Normal P waves  QRS, axis: Right bundle branch block  ST and T waves: Nonspecific ST-T wave    Interpreted Contemporaneously by me, independently viewed  Unchanged compared to prior 3/8/2024      MEDICATIONS GIVEN IN ER  Medications   lactated ringers bolus 500 mL (500 mL Intravenous New Bag 7/24/24 1101)   furosemide (LASIX) injection 80 mg (80 mg Intravenous Given 7/24/24 1323)                   MEDICAL DECISION MAKING, PROGRESS, and CONSULTS    All labs have been independently reviewed by me.  All radiology studies have been reviewed by me and I have also reviewed the radiology report.   EKG's independently viewed and interpreted by me.  Discussion below represents my analysis of pertinent findings related to patient's condition, differential diagnosis, treatment plan and final disposition.      Additional sources:  - Discussed/ obtained information from  independent historians: None    - External (non-ED) record review: Epic reviewed patient was admitted here 6/11/2023 for aspiration pneumonia    - Chronic or social conditions impacting care: None none    - Shared decision making: None      Orders placed during this visit:  Orders Placed This Encounter   Procedures    Respiratory Panel PCR w/COVID-19(SARS-CoV-2) [facility]ITA/[facility] In-House, NP Swab in UTM/VTM, 2 HR TAT - Swab, Nasopharynx    XR Chest 1 View    Comprehensive Metabolic Panel    BNP    Single High Sensitivity Troponin T    CBC Auto Differential    [facility] (on-call MD unless specified) Details    ECG 12 Lead Tachycardia    Initiate Observation Status    CBC & Differential         Additional orders considered but not ordered:          Differential diagnosis includes but is not limited to:    Pneumonia versus CHF versus atrial tachycardia versus SVT versus a flutter      Independent interpretation of labs, radiology studies, and discussions with consultants:  ED Course as of 07/24/24 1325   Wed Jul 24, 2024   1117 11:17 EDT  Initial heart rate 150-160.  Before patient could be given diltiazem patient's heart rate improved to 103.  Most likely SVT or a flutter that is resolved [SL]   1237 proBNP(!): 3,399.0 [SL]   1246 12:46 EDT  Discussed options with family.  Patient still with some rapid breathing.  Probably congestive heart failure.  Patient's BNP is elevated.  Patient's heart rate has improved without intervention.  Has been given Lasix.  Discussed with Dr. Regalado who will admit. [SL]      ED Course User Index  [SL] Carlton Jones MD                 DIAGNOSIS  Final diagnoses:   Acute congestive heart failure, unspecified heart failure type   Atrial tachycardia         DISPOSITION  admit            Latest Documented Vital Signs:  As of 13:25 EDT  BP- 144/86 HR- 96 Temp- 97.6 °F (36.4 °C) (Tympanic) O2 sat- 92%              --    Please note that portions of this were completed with a voice  recognition program.       Note Disclaimer: At [facility], we believe that sharing information builds trust and better relationships. You are receiving this note because you are receiving care at [facility] or recently visited. It is possible you will see health information before a provider has talked with you about it. This kind of information can be easy to misunderstand. To help you fully understand what it means for your health, we urge you to discuss this note with your provider.            Carlton Jones MD  07/24/24 5302

## 2024-07-24 NOTE — PLAN OF CARE
Goal Outcome Evaluation:         Pt admitted to . Tube feeds started. low airloss mattress ordered. Pt from [facility]. Pt needs met and questions answered. Will continue to monitor.

## 2024-07-25 ENCOUNTER — APPOINTMENT (OUTPATIENT)
Dept: CARDIOLOGY | Facility: HOSPITAL | Age: 69
DRG: 291 | End: 2024-07-25
Payer: MEDICARE

## 2024-07-25 LAB
ALBUMIN SERPL-MCNC: 3.4 G/DL (ref 3.5–5.2)
ALBUMIN/GLOB SERPL: 0.9 G/DL
ALP SERPL-CCNC: 137 U/L (ref 39–117)
ALT SERPL W P-5'-P-CCNC: 23 U/L (ref 1–41)
ANION GAP SERPL CALCULATED.3IONS-SCNC: 13.1 MMOL/L (ref 5–15)
AORTIC ARCH: 2.7 CM
AORTIC DIMENSIONLESS INDEX: 0.7 (DI)
AST SERPL-CCNC: 25 U/L (ref 1–40)
BH CV ECHO MEAS - AO MAX PG: 2.47 MMHG
BH CV ECHO MEAS - AO MEAN PG: 1.28 MMHG
BH CV ECHO MEAS - AO ROOT DIAM: 3.6 CM
BH CV ECHO MEAS - AO V2 MAX: 78.5 CM/SEC
BH CV ECHO MEAS - AO V2 VTI: 12.8 CM
BH CV ECHO MEAS - AVA(I,D): 2.49 CM2
BH CV ECHO MEAS - EDV(CUBED): 169 ML
BH CV ECHO MEAS - EDV(MOD-SP2): 215 ML
BH CV ECHO MEAS - EDV(MOD-SP4): 237 ML
BH CV ECHO MEAS - EF(MOD-BP): 22 %
BH CV ECHO MEAS - EF(MOD-SP2): 18.1 %
BH CV ECHO MEAS - EF(MOD-SP4): 26.6 %
BH CV ECHO MEAS - ESV(CUBED): 74.9 ML
BH CV ECHO MEAS - ESV(MOD-SP2): 176 ML
BH CV ECHO MEAS - ESV(MOD-SP4): 174 ML
BH CV ECHO MEAS - FS: 23.8 %
BH CV ECHO MEAS - IVS/LVPW: 1.1 CM
BH CV ECHO MEAS - IVSD: 1.07 CM
BH CV ECHO MEAS - LV DIASTOLIC VOL/BSA (35-75): 123.2 CM2
BH CV ECHO MEAS - LV MASS(C)D: 221 GRAMS
BH CV ECHO MEAS - LV MAX PG: 1.52 MMHG
BH CV ECHO MEAS - LV MEAN PG: 0.85 MMHG
BH CV ECHO MEAS - LV SYSTOLIC VOL/BSA (12-30): 90.4 CM2
BH CV ECHO MEAS - LV V1 MAX: 61.7 CM/SEC
BH CV ECHO MEAS - LV V1 VTI: 9.7 CM
BH CV ECHO MEAS - LVIDD: 5.5 CM
BH CV ECHO MEAS - LVIDS: 4.2 CM
BH CV ECHO MEAS - LVOT AREA: 3.3 CM2
BH CV ECHO MEAS - LVOT DIAM: 2.04 CM
BH CV ECHO MEAS - LVPWD: 0.97 CM
BH CV ECHO MEAS - MED PEAK E' VEL: 4 CM/SEC
BH CV ECHO MEAS - MV A MAX VEL: 57.8 CM/SEC
BH CV ECHO MEAS - MV DEC SLOPE: 195.4 CM/SEC2
BH CV ECHO MEAS - MV DEC TIME: 0.25 SEC
BH CV ECHO MEAS - MV E MAX VEL: 51.4 CM/SEC
BH CV ECHO MEAS - MV E/A: 0.89
BH CV ECHO MEAS - MV MAX PG: 1.75 MMHG
BH CV ECHO MEAS - MV MEAN PG: 0.97 MMHG
BH CV ECHO MEAS - MV P1/2T: 91.9 MSEC
BH CV ECHO MEAS - MV V2 VTI: 24.1 CM
BH CV ECHO MEAS - MVA(P1/2T): 2.39 CM2
BH CV ECHO MEAS - MVA(VTI): 1.32 CM2
BH CV ECHO MEAS - RAP SYSTOLE: 3 MMHG
BH CV ECHO MEAS - SV(LVOT): 31.9 ML
BH CV ECHO MEAS - SV(MOD-SP2): 39 ML
BH CV ECHO MEAS - SV(MOD-SP4): 63 ML
BH CV ECHO MEAS - SVI(LVOT): 16.6 ML/M2
BH CV ECHO MEAS - SVI(MOD-SP2): 20.3 ML/M2
BH CV ECHO MEAS - SVI(MOD-SP4): 32.7 ML/M2
BH CV XLRA - TDI S': 8.5 CM/SEC
BILIRUB SERPL-MCNC: 0.7 MG/DL (ref 0–1.2)
BUN SERPL-MCNC: 12 MG/DL (ref 8–23)
BUN/CREAT SERPL: 15.8 (ref 7–25)
CALCIUM SPEC-SCNC: 8.5 MG/DL (ref 8.6–10.5)
CHLORIDE SERPL-SCNC: 102 MMOL/L (ref 98–107)
CO2 SERPL-SCNC: 23.9 MMOL/L (ref 22–29)
CREAT SERPL-MCNC: 0.76 MG/DL (ref 0.76–1.27)
DEPRECATED RDW RBC AUTO: 44.1 FL (ref 37–54)
EGFRCR SERPLBLD CKD-EPI 2021: 97.3 ML/MIN/1.73
ERYTHROCYTE [DISTWIDTH] IN BLOOD BY AUTOMATED COUNT: 13.6 % (ref 12.3–15.4)
GLOBULIN UR ELPH-MCNC: 3.9 GM/DL
GLUCOSE BLDC GLUCOMTR-MCNC: 129 MG/DL (ref 70–130)
GLUCOSE BLDC GLUCOMTR-MCNC: 157 MG/DL (ref 70–130)
GLUCOSE SERPL-MCNC: 144 MG/DL (ref 65–99)
HCT VFR BLD AUTO: 48.9 % (ref 37.5–51)
HGB BLD-MCNC: 15.6 G/DL (ref 13–17.7)
MAGNESIUM SERPL-MCNC: 2.2 MG/DL (ref 1.6–2.4)
MAXIMAL PREDICTED HEART RATE: 151
MCH RBC QN AUTO: 27.9 PG (ref 26.6–33)
MCHC RBC AUTO-ENTMCNC: 31.9 G/DL (ref 31.5–35.7)
MCV RBC AUTO: 87.5 FL (ref 79–97)
PHOSPHATE SERPL-MCNC: 2.7 MG/DL (ref 2.5–4.5)
PLATELET # BLD AUTO: 247 10*3/MM3 (ref 140–450)
PMV BLD AUTO: 11.8 FL (ref 6–12)
POTASSIUM SERPL-SCNC: 3.5 MMOL/L (ref 3.5–5.2)
PROT SERPL-MCNC: 7.3 G/DL (ref 6–8.5)
RBC # BLD AUTO: 5.59 10*6/MM3 (ref 4.14–5.8)
SODIUM SERPL-SCNC: 139 MMOL/L (ref 136–145)
STRESS TARGET HR: 128
WBC NRBC COR # BLD AUTO: 6.48 10*3/MM3 (ref 3.4–10.8)

## 2024-07-25 PROCEDURE — 83735 ASSAY OF MAGNESIUM: CPT | Performed by: STUDENT IN AN ORGANIZED HEALTH CARE EDUCATION/TRAINING PROGRAM

## 2024-07-25 PROCEDURE — 93306 TTE W/DOPPLER COMPLETE: CPT

## 2024-07-25 PROCEDURE — 80053 COMPREHEN METABOLIC PANEL: CPT | Performed by: STUDENT IN AN ORGANIZED HEALTH CARE EDUCATION/TRAINING PROGRAM

## 2024-07-25 PROCEDURE — 85027 COMPLETE CBC AUTOMATED: CPT | Performed by: STUDENT IN AN ORGANIZED HEALTH CARE EDUCATION/TRAINING PROGRAM

## 2024-07-25 PROCEDURE — 25010000002 ENOXAPARIN PER 10 MG: Performed by: STUDENT IN AN ORGANIZED HEALTH CARE EDUCATION/TRAINING PROGRAM

## 2024-07-25 PROCEDURE — 36415 COLL VENOUS BLD VENIPUNCTURE: CPT | Performed by: STUDENT IN AN ORGANIZED HEALTH CARE EDUCATION/TRAINING PROGRAM

## 2024-07-25 PROCEDURE — 93306 TTE W/DOPPLER COMPLETE: CPT | Performed by: INTERNAL MEDICINE

## 2024-07-25 PROCEDURE — 84100 ASSAY OF PHOSPHORUS: CPT | Performed by: STUDENT IN AN ORGANIZED HEALTH CARE EDUCATION/TRAINING PROGRAM

## 2024-07-25 PROCEDURE — 25510000001 PERFLUTREN (DEFINITY) 8.476 MG IN SODIUM CHLORIDE (PF) 0.9 % 10 ML INJECTION: Performed by: STUDENT IN AN ORGANIZED HEALTH CARE EDUCATION/TRAINING PROGRAM

## 2024-07-25 PROCEDURE — 82948 REAGENT STRIP/BLOOD GLUCOSE: CPT

## 2024-07-25 PROCEDURE — 99222 1ST HOSP IP/OBS MODERATE 55: CPT

## 2024-07-25 RX ORDER — FUROSEMIDE 20 MG/1
20 TABLET ORAL DAILY
Status: DISCONTINUED | OUTPATIENT
Start: 2024-07-25 | End: 2024-07-26 | Stop reason: HOSPADM

## 2024-07-25 RX ORDER — METOPROLOL SUCCINATE 25 MG/1
25 TABLET, EXTENDED RELEASE ORAL
Status: DISCONTINUED | OUTPATIENT
Start: 2024-07-25 | End: 2024-07-26 | Stop reason: HOSPADM

## 2024-07-25 RX ORDER — LOSARTAN POTASSIUM 25 MG/1
12.5 TABLET ORAL
Status: DISCONTINUED | OUTPATIENT
Start: 2024-07-25 | End: 2024-07-26 | Stop reason: HOSPADM

## 2024-07-25 RX ADMIN — ASPIRIN 81 MG: 81 TABLET, CHEWABLE ORAL at 08:00

## 2024-07-25 RX ADMIN — GLYCOPYRROLATE 1 MG: 1 TABLET ORAL at 08:00

## 2024-07-25 RX ADMIN — FUROSEMIDE 20 MG: 20 TABLET ORAL at 20:27

## 2024-07-25 RX ADMIN — Medication 10 ML: at 20:26

## 2024-07-25 RX ADMIN — Medication 10 ML: at 08:00

## 2024-07-25 RX ADMIN — METOPROLOL SUCCINATE 25 MG: 25 TABLET, EXTENDED RELEASE ORAL at 20:24

## 2024-07-25 RX ADMIN — Medication 12.5 MG: at 20:24

## 2024-07-25 RX ADMIN — LOSARTAN POTASSIUM 12.5 MG: 25 TABLET, FILM COATED ORAL at 20:24

## 2024-07-25 RX ADMIN — PERFLUTREN 3 ML: 6.52 INJECTION, SUSPENSION INTRAVENOUS at 12:23

## 2024-07-25 RX ADMIN — GLYCOPYRROLATE 1 MG: 1 TABLET ORAL at 20:24

## 2024-07-25 RX ADMIN — ENOXAPARIN SODIUM 40 MG: 100 INJECTION SUBCUTANEOUS at 16:17

## 2024-07-25 RX ADMIN — TAMSULOSIN HYDROCHLORIDE 0.4 MG: 0.4 CAPSULE ORAL at 08:00

## 2024-07-25 NOTE — PLAN OF CARE
Goal Outcome Evaluation:              Outcome Evaluation: Generalized weakness. Lethargic. Opens eyes to voice. Follows simple commands.Disoriented to place time situation. Answers close ended questions with yes or no or sometimes nods and shakes head as alternative response. NPO.Tolerating TF.VSS. Paged NP on call for potassium replacement. Waiting for response.

## 2024-07-25 NOTE — CASE MANAGEMENT/SOCIAL WORK
Discharge Planning Assessment  [facility]     Patient Name: Aquiles Bahena  MRN: [id number]  Today's Date: 7/25/2024    Admit Date: 7/24/2024    Plan: Return to [facility] Medicaid Bed hold.  [facility] setup to transport today at 2000. Daughter aware of D/C   Discharge Needs Assessment    No documentation.                  Discharge Plan       Row Name 07/25/24 1011       Plan    Plan Return to [facility] Medicaid Bed hold.  [facility] setup to transport today at 2000. Daughter aware of D/C    Patient/Family in Agreement with Plan yes    Plan Comments Per Mir[facility], pt is from [facility] with Medicaid bed hold. D/C order noted. Nurse spoke with daughter and informed he of D/C today. Called and setup transport with  [facility] today at 2000. Konstantin Villatoro RN-BC                  Continued Care and Services - Admitted Since 7/24/2024       Destination Coordination complete.      Service Provider Request Status Selected Services Address Phone Fax Patient Preferred    [facility]  Selected Nursing Home [address] [place] [id number] [phone] --                  Expected Discharge Date and Time       Expected Discharge Date Expected Discharge Time    Jul 25, 2024                       Konstantin Villatoro RN

## 2024-07-25 NOTE — CASE MANAGEMENT/SOCIAL WORK
"Physicians Statement of Medical Necessity for  Ambulance Transportation    GENERAL INFORMATION     Name: Aquiles Bahena  YOB: 1955  Medicare # [id number]   Transport Date: ___________   (Valid for round trips this date, or for scheduled repetitive trips for 60 days from the date signed below.)  Origin: [facility]   Destination: [facility]  Is the Patient's stay covered under Medicare Part A (PPS/DRG?)No   Closest appropriate facility? Yes  If this a hosp-hosp transfer? No  Is this a hospice patient? No    MEDICAL NECESSITY QUESTIONAIRE    Ambulance Transportation is medically necessary only if other means of transportation are contraindicated or would be potentially harmful to the patient.  To meet this requirement, the patient must be either \"bed confined\" or suffer from a condition such that transport by means other than an ambulance is contraindicated by the patient's condition.  The following questions must be answered by the healthcare professional signing below for this form to be valid:     1) Describe the MEDICAL CONDITION (physical and/or mental) of this patient AT THE TIME OF AMBULANCE TRANSPORT that requires the patient to be transported in an ambulance, and why transport by other means is contraindicated by the patient's condition:   Past Medical History:   Diagnosis Date    Anoxic brain damage     Anxiety     Arthritis     BPH (benign prostatic hyperplasia)     Coronary artery disease     COVID-19     Dementia     Dysphagia     GERD (gastroesophageal reflux disease)     Hyperlipidemia     Hypotension     Smoking       Past Surgical History:   Procedure Laterality Date    GTUBE INSERTION        2) Is this patient \"bed confined\" as defined below?Yes   To be \"bed confined\" the patient must satisfy all three of the following criteria:  (1) unable to get up from bed without assistance; AND (2) unable to ambulate;  AND (3) unable to sit in a chair or wheelchair.  3) Can this " patient safely be transported by car or wheelchair van (I.e., may safely sit during transport, without an attendant or monitoring?)No   4. In addition to completing questions 1-3 above, please check any of the following conditions that apply*:          *Note: supporting documentation for any boxes checked must be maintained in the patient's medical records Patient is confused and Other High falls risk, bed bound      SIGNATURE OF PHYSICIAN OR OTHER AUTHORIZED HEALTHCARE PROFESSIONAL    I certify that the above information is true and correct based on my evaluation of this patient, and represent that the patient requires transport by ambulance and that other forms of transport are contraindicated.  I understand that this information will be used by the Centers for Medicare and Medicaid Services (CMS) to support the determiniation of medical necessity for ambulance services, and I represent that I have personal knowledge of the patient's condition at the time of transport.       If this box is checked, I also certify that the patient is physically or mentally incapable of signing the ambulance service's claim form and that the institution with which I am affiliated has furnished care, services or assistance to the patient.  My signature below is made on behalf of the patient pursuant to 42 .36(b)(4). In accordance with 42 .37, the specific reason(s) that the patient is physically or mentally incapable of signing the claim for is as follows:     Signature of Physician or Healthcare Professional  Date/Time:        (For Scheduled repetitive transport, this form is not valid for transports performed more than 60 days after this date).                                                                                                                                            --------------------------------------------------------------------------------------------  Printed Name and Credentials of Physician or  Authorized Healthcare Professional     *Form must be signed by patient's attending physician for scheduled, repetitive transports,.  For non-repetitive ambulance transports, if unable to obtain the signature of the attending physician, any of the following may sign (please select below):     Physician  Clinical Nurse Specialist  Registered Nurse     Physician Assistant  Discharge Planner  Licensed Practical Nurse     Nurse Practitioner

## 2024-07-25 NOTE — PLAN OF CARE
Goal Outcome Evaluation:  Plan of Care Reviewed With: patient        Progress: improving  Outcome Evaluation: Pt stable. Pt to dc to [facility] via [facility] transport at 2000.         Pt not going to be dc today. Cardiology consulted

## 2024-07-25 NOTE — PROGRESS NOTES
Name: Aquiles Bahena ADMIT: 2024   : 1955  PCP: Lali Fink MD    MRN: [id number] LOS: 0 days   AGE/SEX: 69 y.o. male  ROOM: [facility]     Subjective     Examined at bedside. No significant changes. No new complaints.   Objective   Objective   Vital Signs  Temp:  [97.7 °F (36.5 °C)-98.4 °F (36.9 °C)] 98.4 °F (36.9 °C)  Heart Rate:  [78-97] 79  Resp:  [16-20] 16  BP: (117-128)/(91-97) 128/96  SpO2:  [90 %-94 %] 90 %  on   ;   Device (Oxygen Therapy): room air  Body mass index is 27.88 kg/m².  Physical Exam  Constitutional:       Appearance: He is ill-appearing.   HENT:      Head: Normocephalic and atraumatic.   Cardiovascular:      Rate and Rhythm: Normal rate and regular rhythm.   Pulmonary:      Effort: Pulmonary effort is normal. No respiratory distress.   Neurological:      Mental Status: He is alert. Mental status is at baseline. He is disoriented.      Motor: Weakness present.         Results Review     I reviewed the patient's new clinical results.  Results from last 7 days   Lab Units 24  0318 24  1049   WBC 10*3/mm3 6.48 6.28   HEMOGLOBIN g/dL 15.6 15.5   PLATELETS 10*3/mm3 247 203     Results from last 7 days   Lab Units 24  0318 24  1049   SODIUM mmol/L 139 138   POTASSIUM mmol/L 3.5 4.1   CHLORIDE mmol/L 102 106   CO2 mmol/L 23.9 21.7*   BUN mg/dL 12 10   CREATININE mg/dL 0.76 0.68*   GLUCOSE mg/dL 144* 163*   Estimated Creatinine Clearance: 93.3 mL/min (by C-G formula based on SCr of 0.76 mg/dL).  Results from last 7 days   Lab Units 24  0318 24  1049   ALBUMIN g/dL 3.4* 3.6   BILIRUBIN mg/dL 0.7 0.5   ALK PHOS U/L 137* 138*   AST (SGOT) U/L 25 18   ALT (SGPT) U/L 23 22     Results from last 7 days   Lab Units 24  0318 24  1049   CALCIUM mg/dL 8.5* 8.3*   ALBUMIN g/dL 3.4* 3.6   MAGNESIUM mg/dL 2.2  --    PHOSPHORUS mg/dL 2.7  --        COVID19   Date Value Ref Range Status   2024 Not Detected Not Detected - Ref. Range Final    06/15/2023 Not Detected Not Detected - Ref. Range Final     Glucose   Date/Time Value Ref Range Status   07/25/2024 0612 157 (H) 70 - 130 mg/dL Final   07/25/2024 0106 129 70 - 130 mg/dL Final   07/24/2024 1656 136 (H) 70 - 130 mg/dL Final     Results for orders placed or performed during the hospital encounter of 06/11/23   Blood Culture - Blood, Arm, Left    Specimen: Arm, Left; Blood   Result Value Ref Range    Blood Culture No growth at 5 days          Adult Transthoracic Echo Complete W/ Cont if Necessary Per Protocol    Left ventricular systolic function is severely decreased. Left   ventricular ejection fraction appears to be 26 - 30%.    The left ventricular cavity is mildly dilated.    The following left ventricular wall segments are hypokinetic: mid   anterior, basal anterolateral, mid anterolateral, basal inferolateral, mid   inferolateral, apical inferior, mid inferior, basal inferoseptal, mid   inferoseptal, mid anteroseptal, basal anterior, basal inferior and basal   inferoseptal. The following left ventricular wall segments are akinetic:   apical anterior, apical lateral, apical septal and apex.    Left ventricular diastolic function was indeterminate.    The study is technically suboptimal/extremely technically difficult.    Scheduled Medications  aspirin, 81 mg, Per G Tube, Daily  enoxaparin, 40 mg, Subcutaneous, Q24H  glycopyrrolate, 1 mg, Per G Tube, BID  sodium chloride, 10 mL, Intravenous, Q12H  tamsulosin, 0.4 mg, Oral, Daily    Infusions   Diet  NPO Diet NPO Type: Tube Feeding       Assessment/Plan     Active Hospital Problems    Diagnosis  POA    **Acute CHF [I50.9]  Yes    HTN (hypertension) [I10]  Yes    CAD (coronary artery disease) [I25.10]  Yes    GERD (gastroesophageal reflux disease) [K21.9]  Yes    Anoxic brain injury [G93.1]  Yes    Dysphagia [R13.10]  Yes      Resolved Hospital Problems   No resolved problems to display.       69 y.o. male admitted with Acute CHF.    Acute  systolic heart failure  Echocardiogram showed an EF of 26 to 30%.  adding low-dose Lasix and lopressor to medication regimen  Will ask cardiology to evaluate as well.  I/O not accurately recorded    SVT  Adding low dose metoprolol    CAD  GERD  HTN  Resume home medications    Dysphagia  Anoxic brain injury  Immobility syndrome         SCDs for DVT prophylaxis.  Full code.  Discussed with patient and nursing staff.  Anticipate discharge to be determined       Og Regalado MD  [facility]  07/25/24  16:52 EDT

## 2024-07-25 NOTE — CONSULTS
Date of Consultation: 24    Referral Provider: Akin Fournier MD     Reason for Consultation: Systolic heart failure     Encounter Provider: IDRIS King    Group of Service: [facility]     Patient Name: Aquiles Bahena    :1955    Chief complaint:  Shortness of breath, rapid heart rate    History of Present Illness:  Aquiles Bahena is a 69 year old who has a past medical history that is significant for hypertension, coronary artery disease, history of anoxic brain injury with chronic aphasia and dysphagia status post G-tube placement.     He has previously had a cardiac arrest associated with a myocardial infarction (anterior-septal s/p stent placement). He has a residual severe brain injury from this.     He was seen by [facility] during an admission in 2022 and underwent echocardiogram which showed mild to moderate systolic LV dysfunction (EF 40%) with an apical wall motion abnormality. He was not felt to be in decompensated heart failure at that time.     On 24 he was sent from his nursing facility with concerns for shortness of air and rapid heart rate.     Workup in the ED included: HS troponin 16, proBNP 3399, respiratory viral panel negative, chest xray showed mild cardiomegaly, EKG showed sinus tachycardia with RBBB rate of 109. He was noted to have tachycardia while in the ED with a rate in the 150-160's, this resolved spontaneously. He was given 80 mg of IV furosemide in the ED.     Today he underwent echocardiogram which revealed severely decreased left ventricular systolic function with an EF of 26-30%, indeterminate left ventricular diastolic function, the valves were not well visualized, and he had global hypokinesis with akinesis of the apical anterior, apical lateral, apical septal, and apex.     On exam, he is resting in bed. He is able to shake his head yes or no to questions. He denies chest pain or discomfort, palpitations, or shortness  of breath.      Echocardiogram 7/25/24    Left ventricular systolic function is severely decreased. Left ventricular ejection fraction appears to be 26 - 30%.    The left ventricular cavity is mildly dilated.    The following left ventricular wall segments are hypokinetic: mid anterior, basal anterolateral, mid anterolateral, basal inferolateral, mid inferolateral, apical inferior, mid inferior, basal inferoseptal, mid inferoseptal, mid anteroseptal, basal anterior, basal inferior and basal inferoseptal. The following left ventricular wall segments are akinetic: apical anterior, apical lateral, apical septal and apex.    Left ventricular diastolic function was indeterminate.    The study is technically suboptimal/extremely technically difficult.         Past Medical History:   Diagnosis Date    Anoxic brain damage     Anxiety     Arthritis     BPH (benign prostatic hyperplasia)     Coronary artery disease     COVID-19     Dementia     Dysphagia     GERD (gastroesophageal reflux disease)     Hyperlipidemia     Hypotension     Smoking          Past Surgical History:   Procedure Laterality Date    GTUBE INSERTION           Allergies   Allergen Reactions    Bupropion Other (See Comments)         No current facility-administered medications on file prior to encounter.     Current Outpatient Medications on File Prior to Encounter   Medication Sig Dispense Refill    acetaminophen (TYLENOL) 500 MG tablet Administer 1 tablet per G tube Every 4 (Four) Hours As Needed for Mild Pain.      aspirin 81 MG chewable tablet Administer 1 tablet per G tube Daily.      Dimethicone 1.5 % cream Apply 1 Application topically to the appropriate area as directed 2 (Two) Times a Day.      glycopyrrolate (ROBINUL) 1 MG tablet Administer 1 tablet per G tube 2 (Two) Times a Day.      ipratropium-albuterol (DUO-NEB) 0.5-2.5 mg/3 ml nebulizer Take 3 mL by nebulization Every 4 (Four) Hours As Needed for Wheezing or Shortness of Air. 360 mL      "omeprazole (FIRST) 2 MG/ML suspension oral suspension Administer 20 mL per G tube Every Morning.      polyethylene glycol (MIRALAX) 17 GM/SCOOP powder Take 17 g by mouth Daily. 510 g 0    polyvinyl alcohol (LIQUIFILM) 1.4 % ophthalmic solution Administer 1 drop to both eyes Every 4 (Four) Hours As Needed for Dry Eyes.      tamsulosin (FLOMAX) 0.4 MG capsule 24 hr capsule 1 capsule Daily. Via g-tube      donepezil (ARICEPT) 10 MG tablet Administer 1 tablet per G tube Every Night.      loperamide (IMODIUM) 2 MG capsule Administer 1 capsule per G tube Every 6 (Six) Hours As Needed for Diarrhea.      omeprazole (priLOSEC) 40 MG capsule Administer 1 capsule per G tube Daily.      simvastatin (ZOCOR) 40 MG tablet Administer 1 tablet per G tube Every Night.      traZODone (DESYREL) 50 MG tablet Administer 1 tablet per G tube Every Night.           Social History     Socioeconomic History    Marital status:    Tobacco Use    Smoking status: Former     Types: Cigarettes     Passive exposure: Past   Vaping Use    Vaping status: Unknown   Substance and Sexual Activity    Alcohol use: Defer    Drug use: Defer    Sexual activity: Defer         History reviewed. No pertinent family history.    REVIEW OF SYSTEMS:   12 point ROS was performed and is negative except as outlined in HPI       Objective:     Vitals:    07/25/24 0600 07/25/24 0700 07/25/24 1221 07/25/24 1315   BP:  126/91 126/91 128/96   BP Location:  Left arm  Left arm   Patient Position:  Lying  Lying   Pulse:  78 82 79   Resp:  20  16   Temp:  98.4 °F (36.9 °C)  98.4 °F (36.9 °C)   TempSrc:  Oral  Oral   SpO2:  91%  90%   Weight: 81 kg (178 lb 9.2 oz)  80.7 kg (178 lb)    Height:   170.2 cm (67\")      Body mass index is 27.88 kg/m².  Flowsheet Rows      Flowsheet Row First Filed Value   Admission Height 170.2 cm (67\") Documented at 07/25/2024 1221   Admission Weight 73.7 kg (162 lb 9.3 oz) Documented at 07/24/2024 1500              Physical Exam  Vitals " reviewed.   HENT:      Head: Normocephalic.      Nose: Nose normal.   Eyes:      Extraocular Movements: Extraocular movements intact.      Pupils: Pupils are equal, round, and reactive to light.   Cardiovascular:      Rate and Rhythm: Normal rate and regular rhythm.      Pulses: Normal pulses.      Heart sounds: Normal heart sounds. Heart sounds not distant. No murmur heard.     No friction rub. No gallop. No S3 or S4 sounds.   Pulmonary:      Effort: Pulmonary effort is normal.      Breath sounds: Normal breath sounds.   Abdominal:      General: Abdomen is flat. Bowel sounds are normal.      Palpations: Abdomen is soft.      Tenderness: There is no abdominal tenderness.   Skin:     General: Skin is warm and dry.       Lab Review:                Results from last 7 days   Lab Units 07/25/24  0318   SODIUM mmol/L 139   POTASSIUM mmol/L 3.5   CHLORIDE mmol/L 102   CO2 mmol/L 23.9   BUN mg/dL 12   CREATININE mg/dL 0.76   GLUCOSE mg/dL 144*   CALCIUM mg/dL 8.5*     Results from last 7 days   Lab Units 07/24/24  1049   HSTROP T ng/L 16     Results from last 7 days   Lab Units 07/25/24  0318   WBC 10*3/mm3 6.48   HEMOGLOBIN g/dL 15.6   HEMATOCRIT % 48.9   PLATELETS 10*3/mm3 247             Results from last 7 days   Lab Units 07/25/24  0318   MAGNESIUM mg/dL 2.2           EKG (reviewed by me personally):            Assessment/Plan:   1.Acute on chronic HFrEF  He received 80 mg of IV furosemide in the ED with good response.   Echocardiogram today showed reduced EF of 26-30%.   GDMT: Continue po furosemide, will place him on metoprolol succinate, losartan, and spironolactone.   Discussed with Dr. Merritt, would recommend medical management at this time. He is a poor candidate for ischemic evaluation/intervention.  2.  Hypertension  3. Coronary artery disease  A. History of cardiac arrest associated with myocardial infarction (reportedly an anterior-septal MI, with reported stent placement)  4. GERD  5. Hypertension  6.  Anoxic brain injury with residual dysphagia and aphasia     Cardiology will follow, thank you for this consult.     Yolanda Graff, APRN   07/25/24

## 2024-07-25 NOTE — DISCHARGE PLACEMENT REQUEST
Adalgisa Bahena (69 y.o. Male)       Date of Birth   1955    Social Security Number       Address   [facility] [address] [facility] [address]    Home Phone   [phone]    MRN   [id number]       [facility]    Marital Status                               Admission Date   7/24/24    Admission Type   Emergency    Admitting Provider   Og Regalado MD    Attending Provider   Og Regalado MD    Department, Room/Bed   [address], [address]/1       Discharge Date       Discharge Disposition   Skilled Nursing Facility (DC - External)    Discharge Destination                                 Attending Provider: Og Regalado MD    Allergies: Bupropion    Isolation: None   Infection: None   Code Status: No CPR    Ht: --   Wt: 81 kg (178 lb 9.2 oz)    Admission Cmt: None   Principal Problem: Acute CHF [I50.9]                   Active Insurance as of 7/24/2024       Primary Coverage       Payor Plan Insurance Group Employer/Plan Group    MEDICARE MEDICARE A & B        Payor Plan Address Payor Plan Phone Number Payor Plan Fax Number Effective Dates    [address] [phone]  12/1/2011 - None Entered    [facility] [id number]         Subscriber Name Subscriber Birth Date Member ID       ADALGISA BAHENA 1955 [id number]               Secondary Coverage       Payor Plan Insurance Group Employer/Plan Group    [place]        Payor Plan Address Payor Plan Phone Number Payor Plan Fax Number Effective Dates    [address] [phone]  1/16/2017 - None Entered    [place] [address]         Subscriber Name Subscriber Birth Date Member ID       ADALGISA BAHENA 1955 [id number]                     Emergency Contacts        (Rel.) Home Phone Work Phone Mobile Phone    DYLAN BAHENA (Son) [phone] -- [phone]    DEBBIE BAHENA (Daughter) [phone] -- [phone]    MOTORI (Brother) [phone] -- [phone]

## 2024-07-26 VITALS
SYSTOLIC BLOOD PRESSURE: 107 MMHG | DIASTOLIC BLOOD PRESSURE: 74 MMHG | BODY MASS INDEX: 27.89 KG/M2 | HEART RATE: 87 BPM | OXYGEN SATURATION: 97 % | TEMPERATURE: 97.5 F | WEIGHT: 177.69 LBS | HEIGHT: 67 IN | RESPIRATION RATE: 16 BRPM

## 2024-07-26 LAB
ANION GAP SERPL CALCULATED.3IONS-SCNC: 11 MMOL/L (ref 5–15)
BUN SERPL-MCNC: 19 MG/DL (ref 8–23)
BUN/CREAT SERPL: 29.7 (ref 7–25)
CALCIUM SPEC-SCNC: 8.8 MG/DL (ref 8.6–10.5)
CHLORIDE SERPL-SCNC: 102 MMOL/L (ref 98–107)
CO2 SERPL-SCNC: 26 MMOL/L (ref 22–29)
CREAT SERPL-MCNC: 0.64 MG/DL (ref 0.76–1.27)
DEPRECATED RDW RBC AUTO: 43.3 FL (ref 37–54)
EGFRCR SERPLBLD CKD-EPI 2021: 102.5 ML/MIN/1.73
ERYTHROCYTE [DISTWIDTH] IN BLOOD BY AUTOMATED COUNT: 13.5 % (ref 12.3–15.4)
GEN 5 2HR TROPONIN T REFLEX: 16 NG/L
GLUCOSE BLDC GLUCOMTR-MCNC: 130 MG/DL (ref 70–130)
GLUCOSE BLDC GLUCOMTR-MCNC: 150 MG/DL (ref 70–130)
GLUCOSE SERPL-MCNC: 151 MG/DL (ref 65–99)
HCT VFR BLD AUTO: 47.3 % (ref 37.5–51)
HGB BLD-MCNC: 15.1 G/DL (ref 13–17.7)
MAGNESIUM SERPL-MCNC: 2.4 MG/DL (ref 1.6–2.4)
MCH RBC QN AUTO: 28 PG (ref 26.6–33)
MCHC RBC AUTO-ENTMCNC: 31.9 G/DL (ref 31.5–35.7)
MCV RBC AUTO: 87.8 FL (ref 79–97)
PLATELET # BLD AUTO: 220 10*3/MM3 (ref 140–450)
PMV BLD AUTO: 12.5 FL (ref 6–12)
POTASSIUM SERPL-SCNC: 3.8 MMOL/L (ref 3.5–5.2)
QT INTERVAL: 437 MS
QTC INTERVAL: 498 MS
RBC # BLD AUTO: 5.39 10*6/MM3 (ref 4.14–5.8)
SODIUM SERPL-SCNC: 139 MMOL/L (ref 136–145)
TROPONIN T DELTA: -1 NG/L
TROPONIN T SERPL HS-MCNC: 17 NG/L
WBC NRBC COR # BLD AUTO: 6.69 10*3/MM3 (ref 3.4–10.8)

## 2024-07-26 PROCEDURE — 80048 BASIC METABOLIC PNL TOTAL CA: CPT | Performed by: STUDENT IN AN ORGANIZED HEALTH CARE EDUCATION/TRAINING PROGRAM

## 2024-07-26 PROCEDURE — 93005 ELECTROCARDIOGRAM TRACING: CPT | Performed by: STUDENT IN AN ORGANIZED HEALTH CARE EDUCATION/TRAINING PROGRAM

## 2024-07-26 PROCEDURE — 85027 COMPLETE CBC AUTOMATED: CPT | Performed by: STUDENT IN AN ORGANIZED HEALTH CARE EDUCATION/TRAINING PROGRAM

## 2024-07-26 PROCEDURE — 82948 REAGENT STRIP/BLOOD GLUCOSE: CPT

## 2024-07-26 PROCEDURE — 83735 ASSAY OF MAGNESIUM: CPT | Performed by: STUDENT IN AN ORGANIZED HEALTH CARE EDUCATION/TRAINING PROGRAM

## 2024-07-26 PROCEDURE — 84484 ASSAY OF TROPONIN QUANT: CPT | Performed by: STUDENT IN AN ORGANIZED HEALTH CARE EDUCATION/TRAINING PROGRAM

## 2024-07-26 PROCEDURE — 99232 SBSQ HOSP IP/OBS MODERATE 35: CPT | Performed by: INTERNAL MEDICINE

## 2024-07-26 PROCEDURE — 93010 ELECTROCARDIOGRAM REPORT: CPT | Performed by: INTERNAL MEDICINE

## 2024-07-26 RX ORDER — METOPROLOL SUCCINATE 25 MG/1
25 TABLET, EXTENDED RELEASE ORAL
Qty: 30 TABLET | Refills: 0 | Status: SHIPPED | OUTPATIENT
Start: 2024-07-26

## 2024-07-26 RX ORDER — SPIRONOLACTONE 25 MG/1
12.5 TABLET ORAL DAILY
Qty: 30 TABLET | Refills: 0 | Status: SHIPPED | OUTPATIENT
Start: 2024-07-26

## 2024-07-26 RX ORDER — LOSARTAN POTASSIUM 25 MG/1
12.5 TABLET ORAL
Qty: 30 TABLET | Refills: 0 | Status: SHIPPED | OUTPATIENT
Start: 2024-07-26

## 2024-07-26 RX ORDER — FUROSEMIDE 20 MG/1
20 TABLET ORAL DAILY
Qty: 30 TABLET | Refills: 0 | Status: SHIPPED | OUTPATIENT
Start: 2024-07-26

## 2024-07-26 RX ADMIN — GLYCOPYRROLATE 1 MG: 1 TABLET ORAL at 10:26

## 2024-07-26 RX ADMIN — FUROSEMIDE 20 MG: 20 TABLET ORAL at 10:26

## 2024-07-26 RX ADMIN — Medication 12.5 MG: at 10:26

## 2024-07-26 RX ADMIN — ASPIRIN 81 MG: 81 TABLET, CHEWABLE ORAL at 10:26

## 2024-07-26 RX ADMIN — Medication 10 ML: at 10:26

## 2024-07-26 RX ADMIN — LOSARTAN POTASSIUM 12.5 MG: 25 TABLET, FILM COATED ORAL at 10:26

## 2024-07-26 RX ADMIN — METOPROLOL SUCCINATE 25 MG: 25 TABLET, EXTENDED RELEASE ORAL at 10:26

## 2024-07-26 RX ADMIN — TAMSULOSIN HYDROCHLORIDE 0.4 MG: 0.4 CAPSULE ORAL at 10:26

## 2024-07-26 NOTE — DISCHARGE PLACEMENT REQUEST
"Adalgisa Bahena (69 y.o. Male)       Date of Birth   1955    Social Security Number       Address   [facility] [address] [facility] [address]    Home Phone   [phone]    MRN   [id number]       [facility]    Marital Status                               Admission Date   7/24/24    Admission Type   Emergency    Admitting Provider   Og Regalado MD    Attending Provider   Og Regalado MD    Department, Room/Bed   [address], E[address]/1       Discharge Date       Discharge Disposition   Skilled Nursing Facility (DC - External)    Discharge Destination                                 Attending Provider: Og Regalado MD    Allergies: Bupropion    Isolation: None   Infection: None   Code Status: No CPR    Ht: 170.2 cm (67\")   Wt: 80.6 kg (177 lb 11.1 oz)    Admission Cmt: None   Principal Problem: Acute CHF [I50.9]                   Active Insurance as of 7/24/2024       Primary Coverage       Payor Plan Insurance Group Employer/Plan Group    MEDICARE MEDICARE A & B        Payor Plan Address Payor Plan Phone Number Payor Plan Fax Number Effective Dates    [address] [phone]  12/1/2011 - None Entered    [facility] [id number]         Subscriber Name Subscriber Birth Date Member ID       ADALGISA BAHENA 1955 [id number]               Secondary Coverage       Payor Plan Insurance Group Employer/Plan Group    [place]        Payor Plan Address Payor Plan Phone Number Payor Plan Fax Number Effective Dates    [address] [phone]  1/16/2017 - None Entered    [facility] [place] [address]         Subscriber Name Subscriber Birth Date Member ID       ADALGISA BAHENA 1955 [id number]                     Emergency Contacts        (Rel.) Home Phone Work Phone Mobile Phone    BAHENADYLAN SERVIN (Son) [phone] -- [phone]    MODEBBIE (Daughter) [phone] -- [phone]    BAHENATORI SERVIN (Brother) [phone] -- [phone]      "              Discharge Summary        Og Regalado MD at 24 0833              Patient Name: Aquiles Bahena  : 1955  MRN: [id number]    Date of Admission: 2024  Date of Discharge:  2024  Primary Care Physician: Lali Fink MD      Chief Complaint:   Rapid Heart Rate      Discharge Diagnoses     Active Hospital Problems    Diagnosis  POA    **Acute CHF [I50.9]  Yes    HTN (hypertension) [I10]  Yes    CAD (coronary artery disease) [I25.10]  Yes    GERD (gastroesophageal reflux disease) [K21.9]  Yes    Anoxic brain injury [G93.1]  Yes    Dysphagia [R13.10]  Yes      Resolved Hospital Problems   No resolved problems to display.        Hospital Course         This is a 69-year-old male with past medical history of hypertension, coronary artery disease, history of anoxic brain injury with chronic aphasia and dysphagia status post G-tube placement who presented to hospital with shortness of air and rapid heart rate.  He was noted to be in atrial fibrillation with rapid ventricular rate this admission.  He was started on IV fluids and his heart rate spontaneously converted.  An echocardiogram was obtained that showed an ejection fraction of 26 to 30%.  Cardiology was consulted.  Medical management was recommended due to the patient being a poor candidate for ischemic evaluation.  He was started on metoprolol succinate 25 mg daily, losartan 12.5 mg daily, spironolactone 12.5 mg daily, and Lasix 20 mg daily.    A basic metabolic panel should be drawn in approximately 1 week after discharge for evaluation of electrolyte changes with addition of these medications.      Physical Exam:  Temp:  [97.3 °F (36.3 °C)-98.4 °F (36.9 °C)] 97.5 °F (36.4 °C)  Heart Rate:  [79-92] 87  Resp:  [14-18] 16  BP: (104-128)/(65-96) 107/74  Body mass index is 27.83 kg/m².  Physical Exam  Constitutional:       General: He is not in acute distress.     Appearance: He is ill-appearing.   HENT:      Head: Normocephalic and  atraumatic.   Cardiovascular:      Rate and Rhythm: Normal rate and regular rhythm.   Pulmonary:      Effort: Pulmonary effort is normal. No respiratory distress.   Abdominal:      Comments: PEG noted    Neurological:      Mental Status: He is alert. Mental status is at baseline.      Motor: Weakness present.         Consultants     Consult Orders (all) (From admission, onward)       Start     Ordered    07/25/24 1508  Inpatient Cardiology Consult  Once        Specialty:  Cardiology  Provider:  Murphy Bray MD    07/25/24 1507    07/24/24 1521  Inpatient Case Management  Consult  Once        Provider:  (Not yet assigned)    07/24/24 1520    07/24/24 1500  Inpatient Nutrition Consult  Once        Provider:  (Not yet assigned)    07/24/24 1459    07/24/24 1221  [facility] (on-call MD unless specified) Details  Once        Specialty:  Hospitalist  Provider:  (Not yet assigned)    07/24/24 1220                  Procedures     Imaging Results (All)       Procedure Component Value Units Date/Time    XR Chest 1 View [[id number]] Collected: 07/24/24 1055     Updated: 07/24/24 1058    Narrative:      XR CHEST 1 VW-7/24/2024     HISTORY: Shortness of breath.     Heart size is mildly enlarged. Lungs are underinflated with some  vascular crowding. No focal infiltrates are seen. Bones and soft tissues  are unremarkable.       Impression:      1. Mild cardiomegaly.        This report was finalized on 7/24/2024 10:55 AM by Dr. Ameya Hylton M.D on Workstation: [id number]               Pertinent Labs     Results from last 7 days   Lab Units 07/26/24  0342 07/25/24  0318 07/24/24  1049   WBC 10*3/mm3 6.69 6.48 6.28   HEMOGLOBIN g/dL 15.1 15.6 15.5   PLATELETS 10*3/mm3 220 247 203     Results from last 7 days   Lab Units 07/26/24  0342 07/25/24  0318 07/24/24  1049   SODIUM mmol/L 139 139 138   POTASSIUM mmol/L 3.8 3.5 4.1   CHLORIDE mmol/L 102 102 106   CO2 mmol/L 26.0 23.9 21.7*   BUN mg/dL 19 12 10   CREATININE  "mg/dL 0.64* 0.76 0.68*   GLUCOSE mg/dL 151* 144* 163*   Estimated Creatinine Clearance: 110.8 mL/min (A) (by C-G formula based on SCr of 0.64 mg/dL (L)).  Results from last 7 days   Lab Units 07/25/24  0318 07/24/24  1049   ALBUMIN g/dL 3.4* 3.6   BILIRUBIN mg/dL 0.7 0.5   ALK PHOS U/L 137* 138*   AST (SGOT) U/L 25 18   ALT (SGPT) U/L 23 22     Results from last 7 days   Lab Units 07/26/24  0736 07/26/24  0342 07/25/24 0318 07/24/24  1049   CALCIUM mg/dL  --  8.8 8.5* 8.3*   ALBUMIN g/dL  --   --  3.4* 3.6   MAGNESIUM mg/dL 2.4  --  2.2  --    PHOSPHORUS mg/dL  --   --  2.7  --        Results from last 7 days   Lab Units 07/26/24  0736 07/24/24  1049   HSTROP T ng/L 17 16   PROBNP pg/mL  --  3,399.0*           Invalid input(s): \"LDLCALC\"        Test Results Pending at Discharge       Discharge Details        Discharge Medications        New Medications        Instructions Start Date   furosemide 20 MG tablet  Commonly known as: LASIX   20 mg, Oral, Daily      losartan 25 MG tablet  Commonly known as: COZAAR   12.5 mg, Oral, Every 24 Hours Scheduled      metoprolol succinate XL 25 MG 24 hr tablet  Commonly known as: TOPROL-XL   25 mg, Oral, Every 24 Hours Scheduled      spironolactone 25 MG tablet  Commonly known as: ALDACTONE   12.5 mg, Oral, Daily             Changes to Medications        Instructions Start Date   omeprazole 2 MG/ML suspension oral suspension  What changed: Another medication with the same name was removed. Continue taking this medication, and follow the directions you see here.   40 mg, Per G Tube, Every Morning      polyethylene glycol 17 GM/SCOOP powder  Commonly known as: MIRALAX  What changed: how to take this   17 g, Oral, Daily             Continue These Medications        Instructions Start Date   acetaminophen 500 MG tablet  Commonly known as: TYLENOL   500 mg, Per G Tube, Every 4 Hours PRN      aspirin 81 MG chewable tablet   81 mg, Per G Tube, Daily      Dimethicone 1.5 % cream   1 " Application, Topical, 2 Times Daily      glycopyrrolate 1 MG tablet  Commonly known as: ROBINUL   1 mg, Per G Tube, 2 Times Daily      ipratropium-albuterol 0.5-2.5 mg/3 ml nebulizer  Commonly known as: DUO-NEB   3 mL, Nebulization, Every 4 Hours PRN      polyvinyl alcohol 1.4 % ophthalmic solution  Commonly known as: LIQUIFILM   1 drop, Both Eyes, Every 4 Hours PRN      tamsulosin 0.4 MG capsule 24 hr capsule  Commonly known as: FLOMAX   1 capsule, Daily, Via g-tube             Stop These Medications      donepezil 10 MG tablet  Commonly known as: ARICEPT     loperamide 2 MG capsule  Commonly known as: IMODIUM     simvastatin 40 MG tablet  Commonly known as: ZOCOR     traZODone 50 MG tablet  Commonly known as: DESYREL              Allergies   Allergen Reactions    Bupropion Other (See Comments)         Discharge Disposition:  Skilled Nursing Facility (DC - External)    Discharge Diet:  Diet Order   Procedures    NPO Diet NPO Type: Tube Feeding       Discharge Activity:       CODE STATUS:    Code Status and Medical Interventions: No CPR (Do Not Attempt to Resuscitate); Limited Support; No intubation (DNI), No cardioversion   Ordered at: 07/24/24 1355     Medical Intervention Limits:    No intubation (DNI)    No cardioversion     Code Status (Patient has no pulse and is not breathing):    No CPR (Do Not Attempt to Resuscitate)     Medical Interventions (Patient has pulse or is breathing):    Limited Support       No future appointments.   Contact information for follow-up providers       Lali Fink MD .    Specialty: Family Medicine  Contact information:  [address] [address] [phone]                       Contact information for after-discharge care       Destination       [facility] .    Service: Nursing Home  Contact information:  [address] [facility] [address] [phone]                                   Time Spent on Discharge:   Greater than 30 minutes      Og Regalado MD  [facility]  07/26/24  08:33 EDT                Electronically signed by Og Regalado MD at 07/26/24 0835       Discharge Order (From admission, onward)       Start     Ordered    07/26/24 0833  Discharge patient  Once        Expected Discharge Date: 07/26/24   Discharge Disposition: Skilled Nursing Facility (DC - External)   Physician of Record for Attribution - Please select from Treatment Team: OG REGALADO [[id number]]   Review needed by CMO to determine Physician of Record: No      Question Answer Comment   Physician of Record for Attribution - Please select from Treatment Team OG REGALADO    Review needed by CMO to determine Physician of Record No        07/26/24 0833    07/25/24 0926  Discharge patient  Once,   Status:  Canceled        Expected Discharge Date: 07/25/24   Discharge Disposition: Skilled Nursing Facility (DC - External)   Physician of Record for Attribution - Please select from Treatment Team: OG REGALADO [[id number]]   Review needed by CMO to determine Physician of Record: No      Question Answer Comment   Physician of Record for Attribution - Please select from Treatment Team OG REGALADO    Review needed by CMO to determine Physician of Record No        07/25/24 0996

## 2024-07-26 NOTE — DISCHARGE SUMMARY
Patient Name: Aquiles Bahena  : 1955  MRN: [id number]    Date of Admission: 2024  Date of Discharge:  2024  Primary Care Physician: Lali Fink MD      Chief Complaint:   Rapid Heart Rate      Discharge Diagnoses     Active Hospital Problems    Diagnosis  POA    **Acute CHF [I50.9]  Yes    HTN (hypertension) [I10]  Yes    CAD (coronary artery disease) [I25.10]  Yes    GERD (gastroesophageal reflux disease) [K21.9]  Yes    Anoxic brain injury [G93.1]  Yes    Dysphagia [R13.10]  Yes      Resolved Hospital Problems   No resolved problems to display.        Hospital Course         This is a 69-year-old male with past medical history of hypertension, coronary artery disease, history of anoxic brain injury with chronic aphasia and dysphagia status post G-tube placement who presented to hospital with shortness of air and rapid heart rate.  He was noted to be in atrial fibrillation with rapid ventricular rate this admission.  He was started on IV fluids and his heart rate spontaneously converted.  An echocardiogram was obtained that showed an ejection fraction of 26 to 30%.  Cardiology was consulted.  Medical management was recommended due to the patient being a poor candidate for ischemic evaluation.  He was started on metoprolol succinate 25 mg daily, losartan 12.5 mg daily, spironolactone 12.5 mg daily, and Lasix 20 mg daily.    A basic metabolic panel should be drawn in approximately 1 week after discharge for evaluation of electrolyte changes with addition of these medications.      Physical Exam:  Temp:  [97.3 °F (36.3 °C)-98.4 °F (36.9 °C)] 97.5 °F (36.4 °C)  Heart Rate:  [79-92] 87  Resp:  [14-18] 16  BP: (104-128)/(65-96) 107/74  Body mass index is 27.83 kg/m².  Physical Exam  Constitutional:       General: He is not in acute distress.     Appearance: He is ill-appearing.   HENT:      Head: Normocephalic and atraumatic.   Cardiovascular:      Rate and Rhythm: Normal rate and regular  rhythm.   Pulmonary:      Effort: Pulmonary effort is normal. No respiratory distress.   Abdominal:      Comments: PEG noted    Neurological:      Mental Status: He is alert. Mental status is at baseline.      Motor: Weakness present.         Consultants     Consult Orders (all) (From admission, onward)       Start     Ordered    07/25/24 1508  Inpatient Cardiology Consult  Once        Specialty:  Cardiology  Provider:  Murphy Bray MD    07/25/24 1507    07/24/24 1521  Inpatient Case Management  Consult  Once        Provider:  (Not yet assigned)    07/24/24 1520    07/24/24 1500  Inpatient Nutrition Consult  Once        Provider:  (Not yet assigned)    07/24/24 1459    07/24/24 1221  [facility] (on-call MD unless specified) Details  Once        Specialty:  Hospitalist  Provider:  (Not yet assigned)    07/24/24 1220                  Procedures     Imaging Results (All)       Procedure Component Value Units Date/Time    XR Chest 1 View [[id number]] Collected: 07/24/24 1055     Updated: 07/24/24 1058    Narrative:      XR CHEST 1 VW-7/24/2024     HISTORY: Shortness of breath.     Heart size is mildly enlarged. Lungs are underinflated with some  vascular crowding. No focal infiltrates are seen. Bones and soft tissues  are unremarkable.       Impression:      1. Mild cardiomegaly.        This report was finalized on 7/24/2024 10:55 AM by Dr. Ameya Hylton M.D on Workstation: [id number]               Pertinent Labs     Results from last 7 days   Lab Units 07/26/24  0342 07/25/24  0318 07/24/24  1049   WBC 10*3/mm3 6.69 6.48 6.28   HEMOGLOBIN g/dL 15.1 15.6 15.5   PLATELETS 10*3/mm3 220 247 203     Results from last 7 days   Lab Units 07/26/24  0342 07/25/24  0318 07/24/24  1049   SODIUM mmol/L 139 139 138   POTASSIUM mmol/L 3.8 3.5 4.1   CHLORIDE mmol/L 102 102 106   CO2 mmol/L 26.0 23.9 21.7*   BUN mg/dL 19 12 10   CREATININE mg/dL 0.64* 0.76 0.68*   GLUCOSE mg/dL 151* 144* 163*   Estimated Creatinine  "Clearance: 110.8 mL/min (A) (by C-G formula based on SCr of 0.64 mg/dL (L)).  Results from last 7 days   Lab Units 07/25/24  0318 07/24/24  1049   ALBUMIN g/dL 3.4* 3.6   BILIRUBIN mg/dL 0.7 0.5   ALK PHOS U/L 137* 138*   AST (SGOT) U/L 25 18   ALT (SGPT) U/L 23 22     Results from last 7 days   Lab Units 07/26/24  0736 07/26/24  0342 07/25/24  0318 07/24/24  1049   CALCIUM mg/dL  --  8.8 8.5* 8.3*   ALBUMIN g/dL  --   --  3.4* 3.6   MAGNESIUM mg/dL 2.4  --  2.2  --    PHOSPHORUS mg/dL  --   --  2.7  --        Results from last 7 days   Lab Units 07/26/24  0736 07/24/24  1049   HSTROP T ng/L 17 16   PROBNP pg/mL  --  3,399.0*           Invalid input(s): \"LDLCALC\"        Test Results Pending at Discharge       Discharge Details        Discharge Medications        New Medications        Instructions Start Date   furosemide 20 MG tablet  Commonly known as: LASIX   20 mg, Oral, Daily      losartan 25 MG tablet  Commonly known as: COZAAR   12.5 mg, Oral, Every 24 Hours Scheduled      metoprolol succinate XL 25 MG 24 hr tablet  Commonly known as: TOPROL-XL   25 mg, Oral, Every 24 Hours Scheduled      spironolactone 25 MG tablet  Commonly known as: ALDACTONE   12.5 mg, Oral, Daily             Changes to Medications        Instructions Start Date   omeprazole 2 MG/ML suspension oral suspension  What changed: Another medication with the same name was removed. Continue taking this medication, and follow the directions you see here.   40 mg, Per G Tube, Every Morning      polyethylene glycol 17 GM/SCOOP powder  Commonly known as: MIRALAX  What changed: how to take this   17 g, Oral, Daily             Continue These Medications        Instructions Start Date   acetaminophen 500 MG tablet  Commonly known as: TYLENOL   500 mg, Per G Tube, Every 4 Hours PRN      aspirin 81 MG chewable tablet   81 mg, Per G Tube, Daily      Dimethicone 1.5 % cream   1 Application, Topical, 2 Times Daily      glycopyrrolate 1 MG tablet  Commonly " known as: ROBINUL   1 mg, Per G Tube, 2 Times Daily      ipratropium-albuterol 0.5-2.5 mg/3 ml nebulizer  Commonly known as: DUO-NEB   3 mL, Nebulization, Every 4 Hours PRN      polyvinyl alcohol 1.4 % ophthalmic solution  Commonly known as: LIQUIFILM   1 drop, Both Eyes, Every 4 Hours PRN      tamsulosin 0.4 MG capsule 24 hr capsule  Commonly known as: FLOMAX   1 capsule, Daily, Via g-tube             Stop These Medications      donepezil 10 MG tablet  Commonly known as: ARICEPT     loperamide 2 MG capsule  Commonly known as: IMODIUM     simvastatin 40 MG tablet  Commonly known as: ZOCOR     traZODone 50 MG tablet  Commonly known as: DESYREL              Allergies   Allergen Reactions    Bupropion Other (See Comments)         Discharge Disposition:  Skilled Nursing Facility ([place] - External)    Discharge Diet:  Diet Order   Procedures    NPO Diet NPO Type: Tube Feeding       Discharge Activity:       CODE STATUS:    Code Status and Medical Interventions: No CPR (Do Not Attempt to Resuscitate); Limited Support; No intubation (DNI), No cardioversion   Ordered at: 07/24/24 1355     Medical Intervention Limits:    No intubation (DNI)    No cardioversion     Code Status (Patient has no pulse and is not breathing):    No CPR (Do Not Attempt to Resuscitate)     Medical Interventions (Patient has pulse or is breathing):    Limited Support       No future appointments.   Contact information for follow-up providers       Llai Fink MD .    Specialty: Family Medicine  Contact information:  [address]  [address] [address] [phone]                       Contact information for after-discharge care       Destination       [facility] .    Service: Nursing Home  Contact information:  [address] [facility] [address] [phone]                                   Time Spent on Discharge:  Greater than 30 minutes      Og Regalado MD  [facility]  07/26/24  08:33 EDT

## 2024-07-26 NOTE — PROGRESS NOTES
LOS: 1 day   Patient Care Team:  Lali Fink MD as PCP - General (Family Medicine)    Chief Complaint: Follow-up acute on chronic systolic CHF, worsening cardiomyopathy.    Interval History: 14 beat run of NSVT this morning.  He was able to tell me he has not had significant chest pain or shortness of breath.    Vital Signs:  Temp:  [97.3 °F (36.3 °C)-98.4 °F (36.9 °C)] 97.5 °F (36.4 °C)  Heart Rate:  [79-92] 87  Resp:  [14-18] 16  BP: (104-128)/(65-96) 107/74    Intake/Output Summary (Last 24 hours) at 7/26/2024 1131  Last data filed at 7/26/2024 0600  Gross per 24 hour   Intake 1025 ml   Output 1150 ml   Net -125 ml       Physical Exam:   General Appearance:    No acute distress, chronically ill-appearing   Lungs:     Clear to auscultation bilaterally anteriorly.    Heart:    Regular rhythm and normal rate.  No murmurs, gallops, or   rubs.   Abdomen:     Soft, nontender, nondistended.    Extremities:   No clubbing, cyanosis, or edema.     Results Review:    Results from last 7 days   Lab Units 07/26/24  0342   SODIUM mmol/L 139   POTASSIUM mmol/L 3.8   CHLORIDE mmol/L 102   CO2 mmol/L 26.0   BUN mg/dL 19   CREATININE mg/dL 0.64*   GLUCOSE mg/dL 151*   CALCIUM mg/dL 8.8     Results from last 7 days   Lab Units 07/26/24  0936 07/26/24  0736 07/24/24  1049   HSTROP T ng/L 16 17 16     Results from last 7 days   Lab Units 07/26/24  0342   WBC 10*3/mm3 6.69   HEMOGLOBIN g/dL 15.1   HEMATOCRIT % 47.3   PLATELETS 10*3/mm3 220             Results from last 7 days   Lab Units 07/26/24  0736   MAGNESIUM mg/dL 2.4           I reviewed the patient's new clinical results.        Assessment:  1.  Acute on chronic systolic CHF  2.  Worsening cardiomyopathy, EF 25 to 30% by echo  3.  Coronary artery disease, with history of MI and associated cardiac arrest  4.  Anoxic brain injury with residual dysphagia and aphasia  5.  Hypertension  6.  SVT  7.  Chronic immobility and deconditioning  8.  Brief NSVT    Plan:  -Breath  sounds 25 to 30% by echo.  This was previously 40%.    -He is a very poor candidate for any type of invasive evaluation for coronary artery disease.  I feel that the risk of harm greatly outweighs the potential benefits here.  I would treat this medically.    -Lasix 20 mg/day currently.  Does not appear to be volume overloaded.    -Added metoprolol succinate 25 mg/day, losartan 12.5 mg/day, and spironolactone 12.5 mg/day.  Could consider an SGLT2 inhibitor as an outpatient if he is able to tolerate these from a blood pressure standpoint.    -Brief NSVT this morning and some SVT earlier in the hospital stay.  Continue metoprolol.    -Okay to discharge from a cardiac standpoint.  Follow-up in our office will be arranged in 4 weeks.    Chapo Merritt MD  07/26/24  11:31 EDT

## 2024-07-26 NOTE — NURSING NOTE
CTU notified this RN of 14 beat run of Vtach. Upon assessment pt asymptomatic. STAT EKG trop and mag ordered. Cardio and [facility] aware

## 2024-07-26 NOTE — CASE MANAGEMENT/SOCIAL WORK
Case Management Discharge Note      Final Note: Return to [facility] Medicaid Bed hold. Transport by [facility] stretcher today at 1200         Selected Continued Care - Discharged on 7/26/2024 Admission date: 7/24/2024 - Discharge disposition: Skilled Nursing Facility (DC - External)      Destination Coordination complete.      Service Provider Selected Services Address Phone Fax Patient Preferred    [facility] AC[facility] [place] [id number] [phone] --              Durable Medical Equipment    No services have been selected for the patient.                Dialysis/Infusion    No services have been selected for the patient.                Home Medical Care    No services have been selected for the patient.                Therapy    No services have been selected for the patient.                Community Resources    No services have been selected for the patient.                Community & DME    No services have been selected for the patient.                    Transportation Services  Ambulance: [facility]    Final Discharge Disposition Code: 04 - intermediate care facility

## 2024-07-26 NOTE — NURSING NOTE
No new issues overnight. VSS, 1L NC due to apneic episodes but high 90s most of night. TF continue at goal 45. VSS, Disoriented to situation.

## 2024-09-04 ENCOUNTER — HOSPITAL ENCOUNTER (OUTPATIENT)
Dept: CARDIOLOGY | Facility: HOSPITAL | Age: 69
Discharge: HOME | End: 2024-09-04
Payer: MEDICARE

## 2024-09-04 ENCOUNTER — TELEPHONE (OUTPATIENT)
Dept: CARDIOLOGY | Facility: CLINIC | Age: 69
End: 2024-09-04

## 2024-09-04 ENCOUNTER — OFFICE VISIT (OUTPATIENT)
Dept: CARDIOLOGY | Facility: CLINIC | Age: 69
End: 2024-09-04
Payer: MEDICARE

## 2024-09-04 VITALS
HEIGHT: 67 IN | BODY MASS INDEX: 27.83 KG/M2 | DIASTOLIC BLOOD PRESSURE: 65 MMHG | HEART RATE: 44 BPM | SYSTOLIC BLOOD PRESSURE: 90 MMHG

## 2024-09-04 VITALS — HEART RATE: 70 BPM | OXYGEN SATURATION: 86 % | RESPIRATION RATE: 22 BRPM

## 2024-09-04 DIAGNOSIS — I50.21 ACUTE SYSTOLIC CONGESTIVE HEART FAILURE: Primary | ICD-10-CM

## 2024-09-04 PROCEDURE — 25010000002 FUROSEMIDE PER 20 MG: Performed by: NURSE PRACTITIONER

## 2024-09-04 PROCEDURE — 96374 THER/PROPH/DIAG INJ IV PUSH: CPT

## 2024-09-04 PROCEDURE — 36415 COLL VENOUS BLD VENIPUNCTURE: CPT

## 2024-09-04 RX ORDER — FUROSEMIDE 10 MG/ML
40 INJECTION INTRAMUSCULAR; INTRAVENOUS ONCE
Status: COMPLETED | OUTPATIENT
Start: 2024-09-04 | End: 2024-09-04

## 2024-09-04 RX ADMIN — FUROSEMIDE 40 MG: 10 INJECTION, SOLUTION INTRAMUSCULAR; INTRAVENOUS at 12:35

## 2024-09-04 NOTE — TELEPHONE ENCOUNTER
Caller: DEBBIE HASSAN    Relationship: Emergency Contact    Best call back number: [phone]    Who are you requesting to speak with (clinical staff, provider,  specific staff member): IDRIS MCDONALD    What was the call regarding: PT'S DAUGHTER SAID SHE RECEIVED A MISSED CALL FROM IDRIS MCDONALD, PLEASE ADVISE

## 2024-09-04 NOTE — TELEPHONE ENCOUNTER
Reviewed Olivia's message with Radha and she verbalized understanding.    Thank you,  Sugar CUNNINGHAM RN  Triage Nurse TRESSA  09/04/24 14:49 EDT

## 2024-09-04 NOTE — TELEPHONE ENCOUNTER
I called to report that I gave patient more IV Lasix in clinic due to low oxygen saturation.  His oxygen improved after Lasix and he was sent back to the rehab facility.  I will contact the nurses at rehab to inform them of IV Lasix given in clinic today after I am done with clinic and start to make phone call.  Please relay this message to patient's daughter

## 2024-09-04 NOTE — PROGRESS NOTES
PT to [facility] for IV Lasix per order. PT nonverbal but alert and denies pain. RR seem labored at times. SAT 84% upon arrival to [facility] and patient on RA, APRN aware.

## 2024-09-04 NOTE — PROGRESS NOTES
Date of Office Visit: 24  Encounter Provider: IDRIS Lares  Place of Service: [facility]ARDI[facility]  Patient Name: Aquiles Bahena  :1955    Chief Complaint   Patient presents with    Coronary Artery Disease    Congestive Heart Failure    Follow-up   :     HPI: Aquiles Bahena is a 69 y.o. male  with hypertension, coronary artery disease, history of anoxic brain injury with chronic aphasia and dysphagia status post G-tube placement.  He is a former smoker.     He had a remote cardiac arrest associated with a myocardial infarction (anterior-septal s/p stent placement). He has residual severe brain injury from this.       In 2022 he was seen by [facility] heart specialist and had an ejection fraction of 40% with apical wall motion abnormality.  He was not felt to be in decompensated heart failure at that time.  He then was sent to [facility] due to elevated heart rate and concern for shortness of breath.  proBNP was elevated greater than 3000.  Respiratory viral panel was negative.  Chest x-ray showed cardiomegaly and EKG showed sinus tachycardia, right bundle branch block.  He was given IV furosemide and had a repeat echocardiogram 24 showing EF of 26-30% with poor visualization of the valves.  He had akinesis of the apical anterior, apical lateral, apical septal and apex.  Ultimately was started on Toprol-XL 25 mg daily, losartan 12.5 mg daily, spironolactone 12.5 mg daily.  He is B poor candidate for invasive evaluation given concern for risk of harm that could greatly outweigh the potential benefits.  He was treated medically and discharged in stable condition.  SGLT2 inhibitor was to be considered outpatient.    He presents today accompanied by staff member.  He is able to shake his head yes or no appropriately.  He denies chest pain and denies short of breath.  He denies dizziness.  He has no lower extremity edema.  He shakes his head yes to feeling  "bad.  He denies nausea.  He denies headache.  He feels tired.      Allergies   Allergen Reactions    Bupropion Other (See Comments)           Family and social history reviewed.     ROS  All other systems were reviewed and are negative          Objective:     Vitals:    09/04/24 1136   BP: 90/65   BP Location: Left arm   Patient Position: Sitting   Pulse: (!) 44   Height: 170.2 cm (67\")     Body mass index is 27.83 kg/m².    PHYSICAL EXAM:  Physical Exam      ECG 12 Lead    Date/Time: 9/4/2024 5:35 PM  Performed by: Olivia Roberts APRN    Authorized by: Olivia [facility] APRN  Comparison: compared with previous ECG   Similar to previous ECG  Rhythm: sinus rhythm  Rate: normal  BPM: 69  Conduction: right bundle branch block  Q waves: V1, V2 and V3    QRS axis: left    Clinical impression: abnormal EKG            Current Outpatient Medications   Medication Sig Dispense Refill    acetaminophen (TYLENOL) 500 MG tablet Administer 1 tablet per G tube Every 4 (Four) Hours As Needed for Mild Pain.      aspirin 81 MG chewable tablet Administer 1 tablet per G tube Daily.      Dimethicone 1.5 % cream Apply 1 Application topically to the appropriate area as directed 2 (Two) Times a Day.      furosemide (LASIX) 20 MG tablet Take 1 tablet by mouth Daily. 30 tablet 0    glycopyrrolate (ROBINUL) 1 MG tablet Administer 1 tablet per G tube 2 (Two) Times a Day.      ipratropium-albuterol (DUO-NEB) 0.5-2.5 mg/3 ml nebulizer Take 3 mL by nebulization Every 4 (Four) Hours As Needed for Wheezing or Shortness of Air. 360 mL     losartan (COZAAR) 25 MG tablet Take 0.5 tablets by mouth Daily. 30 tablet 0    metoprolol succinate XL (TOPROL-XL) 25 MG 24 hr tablet Take 1 tablet by mouth Daily. 30 tablet 0    omeprazole (FIRST) 2 MG/ML suspension oral suspension Administer 20 mL per G tube Every Morning.      polyethylene glycol (MIRALAX) 17 GM/SCOOP powder Take 17 g by mouth Daily. 510 g 0    polyvinyl alcohol (LIQUIFILM) 1.4 % ophthalmic solution " Administer 1 drop to both eyes Every 4 (Four) Hours As Needed for Dry Eyes.      spironolactone (ALDACTONE) 25 MG tablet Take 0.5 tablets by mouth Daily. 30 tablet 0    tamsulosin (FLOMAX) 0.4 MG capsule 24 hr capsule 1 capsule Daily. Via g-tube       Current Facility-Administered Medications   Medication Dose Route Frequency Provider Last Rate Last Admin    furosemide (LASIX) injection 40 mg  40 mg Intravenous Once Olivia Roberts APRN         Assessment:       Diagnosis Plan   1. Acute systolic congestive heart failure  furosemide (LASIX) injection 40 mg           No orders of the defined types were placed in this encounter.        Plan:   1.  69-year-old gentleman with heart failure/reduced ejection fraction/systolic congestive heart failure/ischemic cardiomyopathy-he is maintained on Toprol XL 25 mg daily, losartan 12.5 mg daily and spironolactone 12.5 mg daily.  He is not on SGLT2 inhibitor at this time.  -I would hold off on SGLT2 inhibitor given need for brief and likely increased risk for urinary tract infection.  -He had some labored breathing in the office and initial oxygen saturation was 84% and then improved to 88%.  I gave him a dose of IV Lasix 40 mg x 1 and his oxygen improved to 94-98 % he was sent back to [facility] postacute care unit.   -I reviewed his most recent lab work which showed stable renal function.   - I left a voicemail with their care unit regarding our visit today.  We will also fax a copy of my note to the attending physician  2.hypertension-his blood pressure was initially low but are recheck in the cardiac evaluation clinic was 150/80  3 coronary artery disease with history of anterior/septal myocardial infarction and stent placement in the remote past.  He denies angina  4.history of anoxic brain injury with chronic aphasia   5.  Chronic dysphagia status post G-tube placement.  He has hx of aspiration pneumonia 06/2023  6.  Right bundle branch block  7. DNR    I called his  daughter Radha, I had to leave a voicemail initially however she called back and we were able to give her an update    Follow-up recommended in 3 months        It has been a pleasure to participate in this patient's care.      Thank you,  IDRIS Lares      **I used Dragon to dictate this note:**

## 2024-09-04 NOTE — TELEPHONE ENCOUNTER
I called [facility], [phone]. I left a voicemail that patient had low oxygen on room air upon arrival and I gave him IV Lasix and his oxygen saturation improved so he was sent back to the facility.      I explained that I will fax a copy of my note to the attending physician Dr. Aleks Escalera fax number listed on paperwork is [phone].  - please fax  There is an alternative fax number for [facility] postacute care which is [phone]

## 2024-09-05 NOTE — TELEPHONE ENCOUNTER
Faxed Olivia Roberts's office noted dated 9/4/24 to Dr. Escalera.  Fax# [phone].  Faxed confirmation received./ [place]

## 2024-12-20 ENCOUNTER — TELEPHONE (OUTPATIENT)
Dept: CARDIOLOGY | Facility: CLINIC | Age: 69
End: 2024-12-20
Payer: MEDICARE

## 2024-12-20 NOTE — TELEPHONE ENCOUNTER
Faxed copy of Olivia Roberts's office note dated 9/4/24 to Ag barker [facility].  Fax# [phone]. Faxed confirmation received./ [place]

## 2024-12-20 NOTE — TELEPHONE ENCOUNTER
Caller: CHEIKH    Relationship: intermediate    Best call back number: [phone]    What form or medical record are you requesting: OFFICE NOTE    Who is requesting this form or medical record from you: PROVIDER    How would you like to receive the form or medical records (pick-up, mail, fax): FAX  If fax, what is the fax number: [phone]    Timeframe paperwork needed: ASAP    Additional notes: CHEIKH FROM [facility] ASKED IF WE COULD FAX THE LAST OFFICE NOTE TO THEM FROM 9/4/24.

## 2025-04-24 ENCOUNTER — HOSPITAL ENCOUNTER (EMERGENCY)
Facility: HOSPITAL | Age: 70
Discharge: HOME | End: 2025-04-24
Attending: STUDENT IN AN ORGANIZED HEALTH CARE EDUCATION/TRAINING PROGRAM
Payer: MEDICARE

## 2025-04-24 ENCOUNTER — APPOINTMENT (OUTPATIENT)
Dept: GENERAL RADIOLOGY | Facility: HOSPITAL | Age: 70
End: 2025-04-24
Payer: MEDICARE

## 2025-04-24 VITALS
WEIGHT: 177.69 LBS | TEMPERATURE: 98.7 F | BODY MASS INDEX: 27.89 KG/M2 | OXYGEN SATURATION: 93 % | RESPIRATION RATE: 18 BRPM | HEART RATE: 93 BPM | DIASTOLIC BLOOD PRESSURE: 68 MMHG | HEIGHT: 67 IN | SYSTOLIC BLOOD PRESSURE: 103 MMHG

## 2025-04-24 DIAGNOSIS — T85.528A DISLODGED GASTROSTOMY TUBE: Primary | ICD-10-CM

## 2025-04-24 PROCEDURE — 25510000002 DIATRIZOATE MEGLUMINE & SODIUM PER 1 ML: Performed by: STUDENT IN AN ORGANIZED HEALTH CARE EDUCATION/TRAINING PROGRAM

## 2025-04-24 PROCEDURE — 99283 EMERGENCY DEPT VISIT LOW MDM: CPT

## 2025-04-24 PROCEDURE — 74018 RADEX ABDOMEN 1 VIEW: CPT

## 2025-04-24 RX ORDER — DIATRIZOATE MEGLUMINE AND DIATRIZOATE SODIUM 660; 100 MG/ML; MG/ML
30 SOLUTION ORAL; RECTAL ONCE
Status: COMPLETED | OUTPATIENT
Start: 2025-04-24 | End: 2025-04-24

## 2025-04-24 RX ADMIN — DIATRIZOATE MEGLUMINE AND DIATRIZOATE SODIUM 30 ML: 660; 100 LIQUID ORAL; RECTAL at 03:07

## 2025-04-24 NOTE — ED NOTES
Pt arrived via [facility] ems from [facility] for G-tube being displaced around 2350 lastnight.

## 2025-04-24 NOTE — ED PROVIDER NOTES
MD ATTESTATION NOTE    The MANJEET and I have discussed this patient's history, physical exam, MDM, and treatment plan.  I have reviewed the documentation and personally had a face to face interaction with the patient. I affirm the documentation and agree with the treatment and plan.  The attached note describes my personal findings.      I provided a substantive portion of the medical decision making.        Brief HPI: 69-year-old male, history of anoxic brain injury, dementia, presents today for evaluation of dislodged feeding tube.      PHYSICAL EXAM  ED Triage Vitals [04/24/25 0047]   Temp Heart Rate Resp BP SpO2   98.7 °F (37.1 °C) 109 18 103/68 98 %      Temp src Heart Rate Source Patient Position BP Location FiO2 (%)   Tympanic Monitor Sitting Right arm --         GENERAL: no acute distress  HENT: nares patent  EYES: no scleral icterus  CV: regular rhythm, normal rate  RESPIRATORY: normal effort  ABDOMEN: soft, G-tube is been replaced, mild erythema surrounding G-tube  MUSCULOSKELETAL: no deformity  NEURO: alert  PSYCH:  calm, cooperative  SKIN: warm, dry    Vital signs and nursing notes reviewed.        Medical Decision Making:  ED Course as of 04/24/25 1901   Thu Apr 24, 2025   0106 I discussed the case with Dr. Amaral and he agrees to evaluate the patient at the bedside.    [CC]   0214 XR Abdomen KUB  Independent interpretation of the KUB is G-tube appears to be in proper place, contrast in the stomach [CC]   0331 Patient is a 69-year-old male presents emergency department today with a dislodged G-tube.  On arrival here in the emergency department vitals are reassuring, he is afebrile.  On my exam to the patient's G-tube has been dislodged.  It was easily replaced by myself.  Placement was checked by x-ray with Gastrografin.  Patient tolerated well.  Patient will be discharged back to the nursing facility. [CC]      ED Course User Index  [CC] Belkis Carson, Tyrone Hicks,  MD  04/24/25 0130       Tyrone Amaral MD  04/24/25 8047

## 2025-04-24 NOTE — ED PROVIDER NOTES
EMERGENCY DEPARTMENT ENCOUNTER  Room Number:  06/06  PCP: Lali Fink MD  Independent Historians: EMS      HPI:  Chief Complaint: had concerns including G-tube dislodged.     A complete HPI/ROS/PMH/PSH/SH/FH are unobtainable due to: Dementia, anoxic brain injury    Chronic or social conditions impacting patient care (Social Determinants of Health): None      Context: Aquiles Bahena is a 69 y.o. male with a medical history of anoxic brain injury, CAD, hypertension, hyperlipidemia, and CHF presents to the emergency department with his G-tube dislodged.  Unclear exactly how long the G-tube had been out but EMS reports that the facility said it was broken.  It was not transported with the patient.  The patient is unable to contribute to the history with the exception of nodding yes or no.  He says no that he is not having any pain.  No abnormal drainage, fever, or chills reported.      Review of prior external notes (non-ED) -and- Review of prior external test results outside of this encounter:   Admitted to the hospital on 7/24/2024 and discharged on 7/26/2024 for A-fib with RVR and CHF.  Echo showed an EF of 26 to 30%.    I reviewed labs from 7/26/2024, hemoglobin 15.1, creatinine 0.64      PAST MEDICAL HISTORY  Active Ambulatory Problems     Diagnosis Date Noted    Anoxic brain injury 05/16/2023    Dementia 05/16/2023    Dysphagia 05/16/2023    GERD (gastroesophageal reflux disease) 05/16/2023    CAD (coronary artery disease) 05/16/2023    Left lower lobe pneumonia 05/16/2023    Gastrostomy tube dysfunction 05/16/2023    BPH (benign prostatic hyperplasia) 05/16/2023    HTN (hypertension) 05/16/2023    HLD (hyperlipidemia) 05/16/2023    Pneumonia of left lower lobe due to infectious organism 05/17/2023    Acute CHF 07/24/2024     Resolved Ambulatory Problems     Diagnosis Date Noted    Aspiration pneumonia of left lower lobe, unspecified aspiration pneumonia type 06/11/2023    Hypoxia 06/12/2023     Past  Medical History:   Diagnosis Date    Anoxic brain damage     Anxiety     Arthritis     Coronary artery disease     COVID-19     Hyperlipidemia     Hypotension     Smoking          PAST SURGICAL HISTORY  Past Surgical History:   Procedure Laterality Date    GTUBE INSERTION           FAMILY HISTORY  No family history on file.      SOCIAL HISTORY  Social History     Socioeconomic History    Marital status:    Tobacco Use    Smoking status: Former     Types: Cigarettes     Passive exposure: Past    Smokeless tobacco: Never   Vaping Use    Vaping status: Unknown   Substance and Sexual Activity    Alcohol use: Defer    Drug use: Defer    Sexual activity: Defer         ALLERGIES  Bupropion      REVIEW OF SYSTEMS  Included in HPI  All systems reviewed and negative except for those discussed in HPI.      PHYSICAL EXAM    I have reviewed the triage vital signs and nursing notes.    ED Triage Vitals [04/24/25 0047]   Temp Heart Rate Resp BP SpO2   98.7 °F (37.1 °C) 109 18 103/68 98 %      Temp src Heart Rate Source Patient Position BP Location FiO2 (%)   Tympanic Monitor Sitting Right arm --       Physical Exam  Constitutional:       Appearance: Normal appearance.   HENT:      Head: Normocephalic and atraumatic.      Mouth/Throat:      Mouth: Mucous membranes are moist.   Eyes:      Extraocular Movements: Extraocular movements intact.      Pupils: Pupils are equal, round, and reactive to light.   Cardiovascular:      Rate and Rhythm: Normal rate and regular rhythm.      Pulses: Normal pulses.      Heart sounds: Normal heart sounds.   Pulmonary:      Effort: Pulmonary effort is normal. No respiratory distress.      Breath sounds: Normal breath sounds.   Abdominal:      General: Abdomen is flat. There is no distension.      Palpations: Abdomen is soft.      Tenderness: There is no abdominal tenderness.      Comments: G-tube site in the upper abdomen.  No purulence, bleeding, or signs of infection.   Musculoskeletal:          General: Normal range of motion.      Cervical back: Normal range of motion and neck supple.   Skin:     General: Skin is warm and dry.      Capillary Refill: Capillary refill takes less than 2 seconds.   Neurological:      Mental Status: He is alert. Mental status is at baseline.   Psychiatric:         Mood and Affect: Mood normal.         Behavior: Behavior normal.           LAB RESULTS  No results found for this or any previous visit (from the past 24 hours).      RADIOLOGY  XR Abdomen KUB  Result Date: 4/24/2025  Patient: ADALGISA HASSAN  Time Out: 03:30 Exam(s): XR ABDOMEN EXAM:   XR Abdomen, 2 Views CLINICAL HISTORY:    Reason for exam: g tube placement. TECHNIQUE:   Frontal view of the abdomen pelvis with upright view of the abdomen. COMPARISON:   No relevant prior studies available. FINDINGS:   Intraperitoneal space:  No free air.   Gastrointestinal tract:  No dilation.   Tubes, lines and devices:  Indwelling G-tube with appropriate intragastric tip position. IMPRESSION:       Indwelling G-tube with appropriate intragastric tip position.     Electronically signed by George Doss MD on 04-24-25 at 0330        MEDICATIONS GIVEN IN ER  Medications   diatrizoate meglumine-sodium (GASTROGRAFIN) 66-10 % oral solution 30 mL (30 mL Per G Tube Given by Other 4/24/25 0307)           OUTPATIENT MEDICATION MANAGEMENT:  No current Epic-ordered facility-administered medications on file.     Current Outpatient Medications Ordered in Epic   Medication Sig Dispense Refill    acetaminophen (TYLENOL) 500 MG tablet Administer 1 tablet per G tube Every 4 (Four) Hours As Needed for Mild Pain.      aspirin 81 MG chewable tablet Administer 1 tablet per G tube Daily.      Dimethicone 1.5 % cream Apply 1 Application topically to the appropriate area as directed 2 (Two) Times a Day.      furosemide (LASIX) 20 MG tablet Take 1 tablet by mouth Daily. 30 tablet 0    glycopyrrolate (ROBINUL) 1 MG tablet Administer 1 tablet per G tube 2  (Two) Times a Day.      ipratropium-albuterol (DUO-NEB) 0.5-2.5 mg/3 ml nebulizer Take 3 mL by nebulization Every 4 (Four) Hours As Needed for Wheezing or Shortness of Air. 360 mL     losartan (COZAAR) 25 MG tablet Take 0.5 tablets by mouth Daily. 30 tablet 0    metoprolol succinate XL (TOPROL-XL) 25 MG 24 hr tablet Take 1 tablet by mouth Daily. 30 tablet 0    omeprazole (FIRST) 2 MG/ML suspension oral suspension Administer 20 mL per G tube Every Morning.      polyethylene glycol (MIRALAX) 17 GM/SCOOP powder Take 17 g by mouth Daily. 510 g 0    polyvinyl alcohol (LIQUIFILM) 1.4 % ophthalmic solution Administer 1 drop to both eyes Every 4 (Four) Hours As Needed for Dry Eyes.      spironolactone (ALDACTONE) 25 MG tablet Take 0.5 tablets by mouth Daily. 30 tablet 0    tamsulosin (FLOMAX) 0.4 MG capsule 24 hr capsule 1 capsule Daily. Via g-tube           PROCEDURES  Feeding Tube Replacement    Date/Time: 4/24/2025 1:26 AM    Performed by: Belkis Carson PA-C  Authorized by: Tyrone Amaral MD    Consent:     Consent obtained:  Verbal    Consent given by:  Patient    Risks, benefits, and alternatives were discussed: yes      Risks discussed:  Bleeding, infection and pain    Alternatives discussed:  No treatment  Pre-procedure details:     Old tube type:  Gastrostomy  Procedure details:     Patient position:  Supine    Procedure type:  Replacement    Tube type:  Gastrostomy    Tube size:  16 Fr    Bulb inflation volume:  10    Bulb inflation fluid:  Normal saline  Post-procedure details:     Placement/position confirmation:  X-ray    Placement difficulty:  Minimal    Bleeding:  Minimal    Procedure completion:  Tolerated          PROGRESS, DATA ANALYSIS, CONSULTS, AND MEDICAL DECISION MAKING  ORDERS PLACED DURING THIS VISIT:  Orders Placed This Encounter   Procedures    Feeding Tube Replacement    XR Abdomen KUB    Undress and Gown       All labs have been independently interpreted by me.  All radiology studies  have been reviewed by me. All EKG's have been independently viewed and interpreted by me.  Discussion below represents my analysis of pertinent findings related to patient's condition, differential diagnosis, treatment plan and final disposition.    Differential diagnosis includes but is not limited to:   G-tube dislodgment, dysfunction, infection    ED Course:  ED Course as of 04/24/25 0333   Thu Apr 24, 2025   0106 I discussed the case with Dr. Amaral and he agrees to evaluate the patient at the bedside.    [CC]   0214 XR Abdomen KUB  Independent interpretation of the KUB is G-tube appears to be in proper place, contrast in the stomach [CC]   0331 Patient is a 69-year-old male presents emergency department today with a dislodged G-tube.  On arrival here in the emergency department vitals are reassuring, he is afebrile.  On my exam to the patient's G-tube has been dislodged.  It was easily replaced by myself.  Placement was checked by x-ray with Gastrografin.  Patient tolerated well.  Patient will be discharged back to the nursing facility. [CC]      ED Course User Index  [CC] Belkis Carson PA-C           AS OF 03:33 EDT VITALS:    BP - 103/68  HR - 111  TEMP - 98.7 °F (37.1 °C) (Tympanic)  O2 SATS - 95%          DIAGNOSIS  Final diagnoses:   Dislodged gastrostomy tube         DISPOSITION  ED Disposition       ED Disposition   Discharge    Condition   Stable    Comment   --                    Please note that portions of this document were completed with a voice recognition program.    Note Disclaimer: At [facility], we believe that sharing information builds trust and better relationships. You are receiving this note because you recently visited [facility]. It is possible you will see health information before a provider has talked with you about it. This kind of information can be easy to misunderstand. To help you fully understand what it means for your health, we urge you to discuss this note  with your provider.     Belkis Carson PA-C  04/24/25 0331

## 2025-04-24 NOTE — ED NOTES
This RN spoke with GIOVANY Brenner at [facility] to update on patient status and discharge.